# Patient Record
Sex: MALE | Race: WHITE | NOT HISPANIC OR LATINO | Employment: OTHER | URBAN - METROPOLITAN AREA
[De-identification: names, ages, dates, MRNs, and addresses within clinical notes are randomized per-mention and may not be internally consistent; named-entity substitution may affect disease eponyms.]

---

## 2017-05-17 ENCOUNTER — TRANSFERRED RECORDS (OUTPATIENT)
Dept: HEALTH INFORMATION MANAGEMENT | Facility: CLINIC | Age: 82
End: 2017-05-17

## 2017-05-18 ENCOUNTER — TRANSFERRED RECORDS (OUTPATIENT)
Dept: HEALTH INFORMATION MANAGEMENT | Facility: CLINIC | Age: 82
End: 2017-05-18

## 2017-06-09 ENCOUNTER — TRANSFERRED RECORDS (OUTPATIENT)
Dept: HEALTH INFORMATION MANAGEMENT | Facility: CLINIC | Age: 82
End: 2017-06-09

## 2017-08-09 ENCOUNTER — TRANSFERRED RECORDS (OUTPATIENT)
Dept: HEALTH INFORMATION MANAGEMENT | Facility: CLINIC | Age: 82
End: 2017-08-09

## 2017-08-13 ENCOUNTER — APPOINTMENT (OUTPATIENT)
Dept: MRI IMAGING | Facility: CLINIC | Age: 82
End: 2017-08-13
Attending: INTERNAL MEDICINE
Payer: MEDICARE

## 2017-08-13 ENCOUNTER — APPOINTMENT (OUTPATIENT)
Dept: CT IMAGING | Facility: CLINIC | Age: 82
End: 2017-08-13
Attending: EMERGENCY MEDICINE
Payer: MEDICARE

## 2017-08-13 ENCOUNTER — HOSPITAL ENCOUNTER (OUTPATIENT)
Facility: CLINIC | Age: 82
Setting detail: OBSERVATION
Discharge: HOME | End: 2017-08-14
Attending: EMERGENCY MEDICINE | Admitting: INTERNAL MEDICINE
Payer: MEDICARE

## 2017-08-13 ENCOUNTER — APPOINTMENT (OUTPATIENT)
Dept: SPEECH THERAPY | Facility: CLINIC | Age: 82
End: 2017-08-13
Payer: MEDICARE

## 2017-08-13 DIAGNOSIS — I63.9 OCCIPITAL STROKE (H): Primary | ICD-10-CM

## 2017-08-13 DIAGNOSIS — I63.50 CEREBROVASCULAR ACCIDENT (CVA) DUE TO STENOSIS OF CEREBRAL ARTERY (H): ICD-10-CM

## 2017-08-13 DIAGNOSIS — E87.1 HYPONATREMIA: ICD-10-CM

## 2017-08-13 DIAGNOSIS — R47.01 EXPRESSIVE APHASIA: ICD-10-CM

## 2017-08-13 PROBLEM — I69.320 APHASIA DUE TO RECENT CEREBROVASCULAR ACCIDENT: Status: ACTIVE | Noted: 2017-08-13

## 2017-08-13 LAB
ALBUMIN UR-MCNC: NEGATIVE MG/DL
AMORPH CRY #/AREA URNS HPF: ABNORMAL /HPF
ANION GAP SERPL CALCULATED.3IONS-SCNC: 6 MMOL/L (ref 3–14)
APPEARANCE UR: ABNORMAL
APTT PPP: 29 SEC (ref 22–37)
BASOPHILS # BLD AUTO: 0 10E9/L (ref 0–0.2)
BASOPHILS NFR BLD AUTO: 0.1 %
BILIRUB UR QL STRIP: NEGATIVE
BUN SERPL-MCNC: 20 MG/DL (ref 7–30)
CALCIUM SERPL-MCNC: 8.2 MG/DL (ref 8.5–10.1)
CHLORIDE SERPL-SCNC: 93 MMOL/L (ref 94–109)
CHOLEST SERPL-MCNC: 87 MG/DL
CO2 SERPL-SCNC: 27 MMOL/L (ref 20–32)
COLOR UR AUTO: YELLOW
CREAT SERPL-MCNC: 0.8 MG/DL (ref 0.66–1.25)
CRP SERPL-MCNC: <2.9 MG/L (ref 0–8)
DIFFERENTIAL METHOD BLD: ABNORMAL
EOSINOPHIL # BLD AUTO: 0 10E9/L (ref 0–0.7)
EOSINOPHIL NFR BLD AUTO: 0 %
ERYTHROCYTE [DISTWIDTH] IN BLOOD BY AUTOMATED COUNT: 14 % (ref 10–15)
GFR SERPL CREATININE-BSD FRML MDRD: ABNORMAL ML/MIN/1.7M2
GLUCOSE BLDC GLUCOMTR-MCNC: 114 MG/DL (ref 70–99)
GLUCOSE BLDC GLUCOMTR-MCNC: 135 MG/DL (ref 70–99)
GLUCOSE SERPL-MCNC: 136 MG/DL (ref 70–99)
GLUCOSE UR STRIP-MCNC: NEGATIVE MG/DL
HBA1C MFR BLD: 5.5 % (ref 4.3–6)
HCT VFR BLD AUTO: 34.5 % (ref 40–53)
HDLC SERPL-MCNC: 45 MG/DL
HGB BLD-MCNC: 12.5 G/DL (ref 13.3–17.7)
HGB UR QL STRIP: ABNORMAL
IMM GRANULOCYTES # BLD: 0.1 10E9/L (ref 0–0.4)
IMM GRANULOCYTES NFR BLD: 0.6 %
INR PPP: 1.11 (ref 0.86–1.14)
INTERPRETATION ECG - MUSE: NORMAL
KETONES UR STRIP-MCNC: NEGATIVE MG/DL
LDLC SERPL CALC-MCNC: 35 MG/DL
LEUKOCYTE ESTERASE UR QL STRIP: NEGATIVE
LYMPHOCYTES # BLD AUTO: 0.5 10E9/L (ref 0.8–5.3)
LYMPHOCYTES NFR BLD AUTO: 5.9 %
MCH RBC QN AUTO: 35.1 PG (ref 26.5–33)
MCHC RBC AUTO-ENTMCNC: 36.2 G/DL (ref 31.5–36.5)
MCV RBC AUTO: 97 FL (ref 78–100)
MONOCYTES # BLD AUTO: 0.5 10E9/L (ref 0–1.3)
MONOCYTES NFR BLD AUTO: 5.3 %
NEUTROPHILS # BLD AUTO: 8 10E9/L (ref 1.6–8.3)
NEUTROPHILS NFR BLD AUTO: 88.1 %
NITRATE UR QL: NEGATIVE
NONHDLC SERPL-MCNC: 42 MG/DL
NRBC # BLD AUTO: 0 10*3/UL
NRBC BLD AUTO-RTO: 0 /100
PH UR STRIP: 7.5 PH (ref 5–7)
PLATELET # BLD AUTO: 164 10E9/L (ref 150–450)
POTASSIUM SERPL-SCNC: 4.2 MMOL/L (ref 3.4–5.3)
RBC # BLD AUTO: 3.56 10E12/L (ref 4.4–5.9)
RBC #/AREA URNS AUTO: 17 /HPF (ref 0–2)
SODIUM SERPL-SCNC: 126 MMOL/L (ref 133–144)
SODIUM SERPL-SCNC: 128 MMOL/L (ref 133–144)
SP GR UR STRIP: 1.05 (ref 1–1.03)
TRIGL SERPL-MCNC: 33 MG/DL
TROPONIN I SERPL-MCNC: NORMAL UG/L (ref 0–0.04)
TSH SERPL DL<=0.005 MIU/L-ACNC: 1.81 MU/L (ref 0.4–4)
URN SPEC COLLECT METH UR: ABNORMAL
UROBILINOGEN UR STRIP-MCNC: NORMAL MG/DL (ref 0–2)
WBC # BLD AUTO: 9 10E9/L (ref 4–11)
WBC #/AREA URNS AUTO: 0 /HPF (ref 0–2)

## 2017-08-13 PROCEDURE — 25000128 H RX IP 250 OP 636: Performed by: INTERNAL MEDICINE

## 2017-08-13 PROCEDURE — 99220 ZZC INITIAL OBSERVATION CARE,LEVL III: CPT | Performed by: INTERNAL MEDICINE

## 2017-08-13 PROCEDURE — 85025 COMPLETE CBC W/AUTO DIFF WBC: CPT | Performed by: EMERGENCY MEDICINE

## 2017-08-13 PROCEDURE — 85610 PROTHROMBIN TIME: CPT | Performed by: EMERGENCY MEDICINE

## 2017-08-13 PROCEDURE — 25000125 ZZHC RX 250: Performed by: EMERGENCY MEDICINE

## 2017-08-13 PROCEDURE — 82306 VITAMIN D 25 HYDROXY: CPT | Performed by: INTERNAL MEDICINE

## 2017-08-13 PROCEDURE — 70498 CT ANGIOGRAPHY NECK: CPT

## 2017-08-13 PROCEDURE — 84295 ASSAY OF SERUM SODIUM: CPT | Performed by: INTERNAL MEDICINE

## 2017-08-13 PROCEDURE — 99285 EMERGENCY DEPT VISIT HI MDM: CPT | Mod: 25

## 2017-08-13 PROCEDURE — 85730 THROMBOPLASTIN TIME PARTIAL: CPT | Performed by: EMERGENCY MEDICINE

## 2017-08-13 PROCEDURE — 84443 ASSAY THYROID STIM HORMONE: CPT | Performed by: INTERNAL MEDICINE

## 2017-08-13 PROCEDURE — 92610 EVALUATE SWALLOWING FUNCTION: CPT | Mod: GN

## 2017-08-13 PROCEDURE — G0378 HOSPITAL OBSERVATION PER HR: HCPCS

## 2017-08-13 PROCEDURE — 25000128 H RX IP 250 OP 636: Performed by: EMERGENCY MEDICINE

## 2017-08-13 PROCEDURE — 00000146 ZZHCL STATISTIC GLUCOSE BY METER IP

## 2017-08-13 PROCEDURE — 70470 CT HEAD/BRAIN W/O & W/DYE: CPT | Mod: XS

## 2017-08-13 PROCEDURE — 81001 URINALYSIS AUTO W/SCOPE: CPT | Performed by: EMERGENCY MEDICINE

## 2017-08-13 PROCEDURE — 83036 HEMOGLOBIN GLYCOSYLATED A1C: CPT | Performed by: INTERNAL MEDICINE

## 2017-08-13 PROCEDURE — 99207 ZZC CDG-CODE CATEGORY CHANGED: CPT | Performed by: INTERNAL MEDICINE

## 2017-08-13 PROCEDURE — 93005 ELECTROCARDIOGRAM TRACING: CPT

## 2017-08-13 PROCEDURE — 70553 MRI BRAIN STEM W/O & W/DYE: CPT

## 2017-08-13 PROCEDURE — 84484 ASSAY OF TROPONIN QUANT: CPT | Performed by: EMERGENCY MEDICINE

## 2017-08-13 PROCEDURE — 84484 ASSAY OF TROPONIN QUANT: CPT | Performed by: INTERNAL MEDICINE

## 2017-08-13 PROCEDURE — 40000225 ZZH STATISTIC SLP WARD VISIT

## 2017-08-13 PROCEDURE — 86140 C-REACTIVE PROTEIN: CPT | Performed by: INTERNAL MEDICINE

## 2017-08-13 PROCEDURE — 96361 HYDRATE IV INFUSION ADD-ON: CPT

## 2017-08-13 PROCEDURE — 80061 LIPID PANEL: CPT | Performed by: INTERNAL MEDICINE

## 2017-08-13 PROCEDURE — 36415 COLL VENOUS BLD VENIPUNCTURE: CPT | Performed by: INTERNAL MEDICINE

## 2017-08-13 PROCEDURE — 80048 BASIC METABOLIC PNL TOTAL CA: CPT | Performed by: EMERGENCY MEDICINE

## 2017-08-13 PROCEDURE — 25000132 ZZH RX MED GY IP 250 OP 250 PS 637: Performed by: INTERNAL MEDICINE

## 2017-08-13 PROCEDURE — 96360 HYDRATION IV INFUSION INIT: CPT

## 2017-08-13 PROCEDURE — 70460 CT HEAD/BRAIN W/DYE: CPT

## 2017-08-13 PROCEDURE — A9585 GADOBUTROL INJECTION: HCPCS | Performed by: INTERNAL MEDICINE

## 2017-08-13 PROCEDURE — 92523 SPEECH SOUND LANG COMPREHEN: CPT | Mod: GN

## 2017-08-13 RX ORDER — HYDRALAZINE HYDROCHLORIDE 20 MG/ML
10-20 INJECTION INTRAMUSCULAR; INTRAVENOUS
Status: DISCONTINUED | OUTPATIENT
Start: 2017-08-13 | End: 2017-08-14 | Stop reason: HOSPADM

## 2017-08-13 RX ORDER — LORATADINE 10 MG/1
10 TABLET ORAL DAILY PRN
COMMUNITY

## 2017-08-13 RX ORDER — HYDROCORTISONE 2.5 %
CREAM (GRAM) TOPICAL 2 TIMES DAILY
COMMUNITY
End: 2018-07-23

## 2017-08-13 RX ORDER — LABETALOL HYDROCHLORIDE 5 MG/ML
10-40 INJECTION, SOLUTION INTRAVENOUS EVERY 10 MIN PRN
Status: DISCONTINUED | OUTPATIENT
Start: 2017-08-13 | End: 2017-08-14 | Stop reason: HOSPADM

## 2017-08-13 RX ORDER — NALOXONE HYDROCHLORIDE 0.4 MG/ML
.1-.4 INJECTION, SOLUTION INTRAMUSCULAR; INTRAVENOUS; SUBCUTANEOUS
Status: DISCONTINUED | OUTPATIENT
Start: 2017-08-13 | End: 2017-08-14 | Stop reason: HOSPADM

## 2017-08-13 RX ORDER — OMEGA-3S/DHA/EPA/FISH OIL/D3 300MG-1000
400 CAPSULE ORAL DAILY
COMMUNITY
End: 2022-11-28

## 2017-08-13 RX ORDER — MAGNESIUM CARB/ALUMINUM HYDROX 105-160MG
1 TABLET,CHEWABLE ORAL DAILY
COMMUNITY
End: 2018-07-23

## 2017-08-13 RX ORDER — GADOBUTROL 604.72 MG/ML
6 INJECTION INTRAVENOUS ONCE
Status: COMPLETED | OUTPATIENT
Start: 2017-08-13 | End: 2017-08-13

## 2017-08-13 RX ORDER — SODIUM CHLORIDE 9 MG/ML
INJECTION, SOLUTION INTRAVENOUS CONTINUOUS
Status: DISCONTINUED | OUTPATIENT
Start: 2017-08-13 | End: 2017-08-14 | Stop reason: HOSPADM

## 2017-08-13 RX ORDER — ASPIRIN 300 MG/1
300 SUPPOSITORY RECTAL DAILY
Status: DISCONTINUED | OUTPATIENT
Start: 2017-08-13 | End: 2017-08-14 | Stop reason: HOSPADM

## 2017-08-13 RX ORDER — METRONIDAZOLE 10 MG/G
GEL TOPICAL DAILY
COMMUNITY

## 2017-08-13 RX ORDER — BETAMETHASONE DIPROPIONATE 0.5 MG/G
CREAM TOPICAL 2 TIMES DAILY PRN
COMMUNITY
End: 2022-11-28

## 2017-08-13 RX ORDER — ASPIRIN 300 MG/1
300 SUPPOSITORY RECTAL ONCE
Status: COMPLETED | OUTPATIENT
Start: 2017-08-13 | End: 2017-08-13

## 2017-08-13 RX ORDER — LIDOCAINE 40 MG/G
CREAM TOPICAL
Status: DISCONTINUED | OUTPATIENT
Start: 2017-08-13 | End: 2017-08-14 | Stop reason: HOSPADM

## 2017-08-13 RX ORDER — MULTIVIT WITH MINERALS/LUTEIN
1 TABLET ORAL DAILY
COMMUNITY
End: 2018-07-23

## 2017-08-13 RX ORDER — IOPAMIDOL 755 MG/ML
120 INJECTION, SOLUTION INTRAVASCULAR ONCE
Status: COMPLETED | OUTPATIENT
Start: 2017-08-13 | End: 2017-08-13

## 2017-08-13 RX ADMIN — ASPIRIN 300 MG: 300 SUPPOSITORY RECTAL at 11:46

## 2017-08-13 RX ADMIN — SODIUM CHLORIDE 100 ML: 9 INJECTION, SOLUTION INTRAVENOUS at 10:09

## 2017-08-13 RX ADMIN — SODIUM CHLORIDE 500 ML: 9 INJECTION, SOLUTION INTRAVENOUS at 09:55

## 2017-08-13 RX ADMIN — SODIUM CHLORIDE: 9 INJECTION, SOLUTION INTRAVENOUS at 11:01

## 2017-08-13 RX ADMIN — SODIUM CHLORIDE: 9 INJECTION, SOLUTION INTRAVENOUS at 19:41

## 2017-08-13 RX ADMIN — IOPAMIDOL 120 ML: 755 INJECTION, SOLUTION INTRAVENOUS at 10:09

## 2017-08-13 RX ADMIN — GADOBUTROL 6 ML: 604.72 INJECTION INTRAVENOUS at 17:10

## 2017-08-13 ASSESSMENT — ENCOUNTER SYMPTOMS
NUMBNESS: 0
SPEECH DIFFICULTY: 1
HEADACHES: 1
WEAKNESS: 0
SHORTNESS OF BREATH: 0
FEVER: 0
DIZZINESS: 1

## 2017-08-13 ASSESSMENT — ACTIVITIES OF DAILY LIVING (ADL)
COGNITION: 0 - NO COGNITION ISSUES REPORTED
AMBULATION: 0-->INDEPENDENT
WHICH_OF_THE_ABOVE_FUNCTIONAL_RISKS_HAD_A_RECENT_ONSET_OR_CHANGE?: COMMUNICATION/SPEECH;COGNITION
TOILETING: 0-->INDEPENDENT
RETIRED_COMMUNICATION: 0-->UNDERSTANDS/COMMUNICATES WITHOUT DIFFICULTY
RETIRED_EATING: 0-->INDEPENDENT
BATHING: 0-->INDEPENDENT
DRESS: 0-->INDEPENDENT
TRANSFERRING: 0-->INDEPENDENT
SWALLOWING: 0-->SWALLOWS FOODS/LIQUIDS WITHOUT DIFFICULTY

## 2017-08-13 ASSESSMENT — VISUAL ACUITY
OU: NORMAL ACUITY
OU: NORMAL ACUITY;GLASSES

## 2017-08-13 NOTE — IP AVS SNAPSHOT
MRN:[id number]                      After Visit Summary   8/13/2017    Corwin Puentes    MRN: [id number]           Thank you!     Thank you for choosing [facility] for your care. Our goal is always to provide you with excellent care. Hearing back from our patients is one way we can continue to improve our services. Please take a few minutes to complete the written survey that you may receive in the mail after you visit with us. Thank you!        Patient Information     Date Of Birth          5/27/1927        Designated Caregiver       Most Recent Value    Caregiver    Will someone help with your care after discharge? yes    Name of designated caregiver wife    Phone number of caregiver see facesheet    Caregiver address see facesheet      About your hospital stay     You were admitted on:  August 13, 2017 You last received care in the:  [address] [facility]    You were discharged on:  August 14, 2017        Reason for your hospital stay       Admitted to hospital due to some trouble with word finding and finishing sentences.  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                  Who to Call     For medical emergencies, please call 911.  For non-urgent questions about your medical care, please call your primary care provider or clinic, [phone]          Attending Provider     Provider Specialty    Shannan Key MD Emergency Medicine    [facility]       Primary Care Provider Office Phone # Fax #    Hernandez Heaton [phone]      After Care Instructions     Diet       Follow this diet upon discharge: Orders Placed This Encounter      Combination Diet Regular Diet Adult; Thin Liquids (water, ice chips, juice, milk, gelatin, ice cream, etc)                  Follow-up Appointments     Follow-up and recommended labs and tests        You have selected [facility] to establish your Primary  [facility].  Please see your appointment listed below    Hold metoprolol until seen by primary care physician to recheck pulse    You should receive a call from Sandeep from [facility] to schedule a Follow-up with Dr. Robison in one month for follow-up of stroke. Located at:    [facility]  [facility]   [address], [address]   [place] [address]    [phone] (Neurology services)                  Your next 10 appointments already scheduled     Aug 21, 2017 11:40 AM CDT   Office Visit with Malathi Johnson MD   [facility] ([facility]    [address] [address] [phone]           Bring a current list of meds and any records pertaining to this visit. For Physicals, please bring immunization records and any forms needing to be filled out. Please arrive 10 minutes early to complete paperwork.              Additional Services     Occupational Therapy Referral       *This therapy referral will be filtered to a centralized scheduling office at [facility] and the patient will receive a call to schedule an appointment at a [place] location most convenient for them. *     [facility] provides Occupational Therapy evaluation and treatment and many specialty services across the [place] system.  If requesting a specialty program, please choose from the list below.    If you have not heard from the scheduling office within 2 business days, please call [phone] for all locations, with the exception of [place], please call [phone].     Treatment: Evaluation & Treatment  Special Instructions/Modalities:   Special Programs: Cognition Assessment     Please be aware that coverage of these services is subject to the terms and limitations of your health insurance plan.  Call member services at your health plan with any benefit or coverage questions.      **Note to Provider:  If  "you are referring outside of [place] for the therapy appointment, please list the name of the location in the \"special instructions\" above, print the referral and give to the patient to schedule the appointment.            Speech Therapy Referral       *This therapy referral will be filtered to a centralized scheduling office at [facility] and the patient will receive a call to schedule an appointment at a [place] location most convenient for them. *     [facility] provides Speech Therapy evaluation and treatment and many specialty services across the [place] system.  If requesting a specialty program, please choose from the list below.  If you have not heard from the scheduling office within 2 business days, please call [phone] for all locations, with the exception of [place], please call [phone].       Treatment: Evaluation & Treatment  Speech Treatment Diagnosis: Cognitive Deficits  Recent stroke with expressive aphasia  Special Instructions: none  Special Programs: Cognition Assessment - per speech and PT evaluation done as inpatient     Please be aware that coverage of these services is subject to the terms and limitations of your health insurance plan.  Call member services at your health plan with any benefit or coverage questions.      **Note to Provider:  If you are referring outside of [place] for the therapy appointment, please list the name of the location in the \"special instructions\" above, print the referral and give to the patient to schedule the appointment.                  Further instructions from your care team       Your risk factors for stroke or TIA (transient ischemic attack):    Your Risk Factors Your Results Normal Ranges   High blood pressure BP Readings from Last 1 Encounters:   08/14/17 105/60    Less than 120/80   Cholesterol              Total Lab Results   Component Value Date    CHOL 87 08/13/2017      Less than 150    " Triglycerides   Lab Results   Component Value Date    TRIG 33 08/13/2017    Less than 150   LDL Lab Results   Component Value Date    LDL 35 08/13/2017       Less than 70   HDL Lab Results   Component Value Date    HDL 45 08/13/2017            Greater than 40 (men)  Greater than 50 (women)   Diabetes   Recent Labs  Lab 08/14/17  0812   GLC 98    Fasting blood glucose    Smoking/tobacco use  Quit smoking and tobacco   Overweight  Lose 1-2 pounds a week   Lack of exercise  30 minutes moderate activity each day   Other risk factors include carotid (neck) artery disease, atrial fibrillation and stress. You may be on new medicine to treat high blood pressure, cholesterol, diabetes or atrial fibrillation.    Understanding Stroke Booklet given to patient. Please refer to booklet for further information.    Stroke warning signs and symptoms - CALL 911 right away for:  - Sudden numbness or weakness in the face, arm or leg (often on one side of the body).  - Sudden confusion or trouble understanding what is going on.  - Sudden blurred or decreased vision in one or both eyes.  - Sudden trouble speaking, loss of balance, dizziness or problems with coordination.  - Sudden, severe headache for no reason.  - Fainting or seizures.  - Symptoms may go away then come back suddenly.    Pending Results     Date and Time Order Name Status Description    8/13/2017 1347 Vitamin D Deficiency In process             Statement of Approval     Ordered          08/14/17 1348  I have reviewed and agree with all the recommendations and orders detailed in this document.  EFFECTIVE NOW     Approved and electronically signed by:  Natalie Pollard MD             Admission Information     Date & Time Provider Department Dept. Phone    8/13/2017 Natalie Pollard MD [address] [facility] [phone]      Your Vitals Were     Blood Pressure Pulse Temperature Respirations Height Weight    105/60 (BP Location: Right arm)  "75 97.6  F (36.4  C) (Oral) 16 1.753 m (5' 9\") 63.2 kg (139 lb 5.3 oz)    Pulse Oximetry BMI (Body Mass Index)                98% 20.58 kg/m2          [email or web address] Information     [email or web address] lets you send messages to your doctor, view your test results, renew your prescriptions, schedule appointments and more. To sign up, go to [email or web address] . Click on \"Log in\" on the left side of the screen, which will take you to the Welcome page. Then click on \"Sign up Now\" on the right side of the page.     You will be asked to enter the access code listed below, as well as some personal information. Please follow the directions to create your username and password.     Your access code is: [email or web address]  Expires: 2017 11:54 AM     Your access code will  in 90 days. If you need help or a new code, please call your [place] clinic or [phone].        Care EveryWhere ID     This is your Care EveryWhere ID. This could be used by other organizations to access your [place] medical records  [id number]        Equal Access to Services     MELINDA STEEL AH: Tracy Elam, wafani mallory, qaoswald kaalmada liset, arlene wilkinson. So [facility] [phone].    ATENCIÓN: Si habla español, tiene a kathleen disposición servicios gratuitos de asistencia lingüística. Kofi al [phone].    We comply with applicable federal civil rights laws and [place] laws. We do not discriminate on the basis of race, color, national origin, age, disability sex, sexual orientation or gender identity.               Review of your medicines      CONTINUE these medicines which may have CHANGED, or have new prescriptions. If we are uncertain of the size of tablets/capsules you have at home, strength may be listed as something that might have changed.        Dose / Directions    aspirin 325 MG EC tablet   This may have changed:    - medication strength  - how much to take        Dose:  325 mg   Take 1 tablet " (325 mg) by mouth daily   Quantity:  40 tablet   Refills:  1         CONTINUE these medicines which have NOT CHANGED        Dose / Directions    ATIVAN PO        Dose:  0.25 mg   Take 0.25 mg by mouth daily   Refills:  0       BENADRYL PO        Dose:  25 mg   Take 25 mg by mouth daily as needed   Refills:  0       betamethasone dipropionate 0.05 % cream   Commonly known as:  DIPROSONE        Apply topically 2 times daily   Refills:  0       CENTRUM SILVER per tablet        Dose:  1 tablet   Take 1 tablet by mouth daily   Refills:  0       cholecalciferol 400 UNITS tablet   Commonly known as:  Vitamin D        Dose:  400 Units   Take 400 Units by mouth daily   Refills:  0       glucosamine-chondroitinoitin 750-600 MG Tabs        Dose:  1 tablet   Take 1 tablet by mouth daily   Refills:  0       hydrocortisone 2.5 % cream        Apply topically 2 times daily   Refills:  0       LIPITOR PO        Dose:  10 mg   Take 10 mg by mouth daily   Refills:  0       loratadine 10 MG tablet   Commonly known as:  CLARITIN        Dose:  10 mg   Take 10 mg by mouth daily   Refills:  0       metroNIDAZOLE 1 % gel   Commonly known as:  METROGEL        Apply topically daily   Refills:  0       polyethylene glycol 0.4%- propylene glycol 0.3% 0.4-0.3 % Soln ophthalmic solution   Commonly known as:  SYSTANE ULTRA        Dose:  1 drop   Place 1 drop into both eyes 2 times daily   Refills:  0       saline 0.9 % Aers        Dose:  1 spray   Spray 1 spray in nostril daily   Refills:  0         STOP taking     METOPROLOL SUCCINATE ER PO                    Protect others around you: Learn how to safely use, store and throw away your medicines at www.disposemymeds.org.             Medication List: This is a list of all your medications and when to take them. Check marks below indicate your daily home schedule. Keep this list as a reference.      Medications           Morning Afternoon Evening Bedtime As Needed    aspirin 325 MG EC tablet   Take  1 tablet (325 mg) by mouth daily   Last time this was given:  325 mg on 8/14/2017  8:20 AM                                   ATIVAN PO   Take 0.25 mg by mouth daily                                   BENADRYL PO   Take 25 mg by mouth daily as needed                                   betamethasone dipropionate 0.05 % cream   Commonly known as:  DIPROSONE   Apply topically 2 times daily   Last time this was given:  resume                                CENTRUM SILVER per tablet   Take 1 tablet by mouth daily   Last time this was given:  resume                                cholecalciferol 400 UNITS tablet   Commonly known as:  Vitamin D   Take 400 Units by mouth daily   Last time this was given:  resume                                glucosamine-chondroitinoitin 750-600 MG Tabs   Take 1 tablet by mouth daily   Last time this was given:  resume                                hydrocortisone 2.5 % cream   Apply topically 2 times daily   Last time this was given:  resume                                LIPITOR PO   Take 10 mg by mouth daily                                   loratadine 10 MG tablet   Commonly known as:  CLARITIN   Take 10 mg by mouth daily   Last time this was given:  resume                                metroNIDAZOLE 1 % gel   Commonly known as:  METROGEL   Apply topically daily   Last time this was given:  resume                                polyethylene glycol 0.4%- propylene glycol 0.3% 0.4-0.3 % Soln ophthalmic solution   Commonly known as:  SYSTANE ULTRA   Place 1 drop into both eyes 2 times daily   Last time this was given:  resume                                saline 0.9 % Aers   Spray 1 spray in nostril daily   Last time this was given:  resume

## 2017-08-13 NOTE — PHARMACY-ADMISSION MEDICATION HISTORY
Admission medication history interview status for the 8/13/2017  admission is complete. See EPIC admission navigator for prior to admission medications     Medication history source reliability:Good    Actions taken by pharmacist (provider contacted, etc):None     Additional medication history information not noted on PTA med list :None    Medication reconciliation/reorder completed by provider prior to medication history? No    Time spent in this activity: 15 minutes    Prior to Admission medications    Medication Sig Last Dose Taking? Auth Provider   ASPIRIN PO Take 81 mg by mouth daily  8/12/2017 at Unknown time Yes Reported, Patient   betamethasone dipropionate (DIPROSONE) 0.05 % cream Apply topically 2 times daily 8/12/2017 at Unknown time Yes Reported, Patient   loratadine (CLARITIN) 10 MG tablet Take 10 mg by mouth daily 8/12/2017 at Unknown time Yes Reported, Patient   hydrocortisone 2.5 % cream Apply topically 2 times daily 8/12/2017 at Unknown time Yes Reported, Patient   Atorvastatin Calcium (LIPITOR PO) Take 10 mg by mouth daily  8/12/2017 at Unknown time Yes Reported, Patient   METOPROLOL SUCCINATE ER PO Take 12.5 mg by mouth 2 times daily  8/12/2017 at Unknown time Yes Reported, Patient   metroNIDAZOLE (METROGEL) 1 % gel Apply topically daily 8/12/2017 at Unknown time Yes Reported, Patient   LORazepam (ATIVAN PO) Take 0.25 mg by mouth daily 8/12/2017 at Unknown time Yes Unknown, Entered By History   Multiple Vitamins-Minerals (CENTRUM SILVER) per tablet Take 1 tablet by mouth daily 8/12/2017 at Unknown time Yes Unknown, Entered By History   glucosamine-chondroitinoitin 750-600 MG TABS Take 1 tablet by mouth daily 8/12/2017 at Unknown time Yes Unknown, Entered By History   saline 0.9 % AERS Spray 1 spray in nostril daily 8/12/2017 at Unknown time Yes Unknown, Entered By History   DiphenhydrAMINE HCl (BENADRYL PO) Take 25 mg by mouth daily as needed prn Yes Unknown, Entered By History   cholecalciferol  (VITAMIN D) 400 UNITS tablet Take 400 Units by mouth daily 8/12/2017 at Unknown time Yes Unknown, Entered By History   polyethylene glycol 0.4%- propylene glycol 0.3% (SYSTANE ULTRA) 0.4-0.3 % SOLN ophthalmic solution Place 1 drop into both eyes 2 times daily 8/12/2017 at Unknown time Yes Unknown, Entered By History

## 2017-08-13 NOTE — UTILIZATION REVIEW
"[facility] Utilization Review  Admission Status; Secondary Review Determination     Admission Date: 8/13/2017  9:26 AM      Under the authority of the Utilization Management Committee, the utilization review process indicated a secondary review on the above patient.  The review outcome is based on review of the medical records, discussions with staff, and applying clinical experience noted on the date of the review.        (X) Observation Status Appropriate - This patient does not meet hospital inpatient criteria and is placed in observation status. If this patient's primary payer is Medicare and was admitted as an inpatient, Condition Code 44 should be used and patient status changed to \"observation\".   () Observation Status concurrent Review           RATIONALE FOR DETERMINATION   89 year old male who presented with expressive aphasia, is admitted as inpatient with suspected left MCA transient ischemic attack. Symptoms have resolved and Initial evaluation was un-revealing for acute ischemic stroke on brain imaging. He has  of right vertebral artery. He is commenced asa, physical therapy and speech evaluation. MRI of brain is pending. Given no imaging evidence of stroke and resolution of symptoms with no residual effects, and anticipated length of stay less than two midnights, criteria for inpatient admission is not met. Observation status is appropriate for cares noted above. I discussed plan of care with Dr. Pollard who concurred with the determination.      The severity of illness, intensity of service provided, expected LOS make the care appropriate for observation status at this time.        The information on this document is developed by the utilization review team in order for the business office to ensure compliance.  This only denotes the appropriateness of proper admission status and does not reflect the quality of care rendered.              Sincerely,       Donna Solitario MD  Physician " Advisor  Utilization Review-[place]    Phone: [phone]

## 2017-08-13 NOTE — PROGRESS NOTES
08/13/17 1400   General Information   Onset Date 08/13/17   Start of Care Date 08/13/17   Referring Physician Natalie Jenkins MD   Patient/Family Goals Statement None stated   Swallowing Evaluation Bedside swallow evaluation   Behaviorial Observations WFL (within functional limits)   Mode of current nutrition NPO   Comments Pt presents with some expressive aphasia to the ED this am, but no hx of CVA and currently the CT scans are not showing any acute CVA. His family is present and states that his expressive language is much improved from this am.    Clinical Swallow Evaluation   Oral Musculature generally intact   Structural Abnormalities none present   Dentition present and adequate   Mucosal Quality good   Mandibular Strength and Mobility intact   Oral Labial Strength and Mobility WFL   Lingual Strength and Mobility WFL   Velar Elevation intact   Buccal Strength and Mobility intact   Additional Documentation Yes   Clinical Swallow Eval: Thin Liquid Texture Trial   Mode of Presentation, Thin Liquids cup   Volume of Liquid or Food Presented 1/2 c   Oral Phase of Swallow WFL   Pharyngeal Phase of Swallow intact   Diagnostic Statement No deficits   Clinical Swallow Eval: Puree Solid Texture Trial   Mode of Presentation, Puree spoon   Volume of Puree Presented 1/4 cup   Oral Phase, Puree WFL   Pharyngeal Phase, Puree intact   Diagnostic Statement No deficits   Clinical Swallow Eval: Solid Food Texture Trial   Mode of Presentation, Solid self-fed   Volume of Solid Food Presented 2 bites   Oral Phase, Solid WFL   Pharyngeal Phase, Solid intact   Diagnostic Statement No deficits   Swallow Compensations   Swallow Compensations No compensations were used   Esophageal Phase of Swallow   Patient reports or presents with symptoms of esophageal dysphagia No   General Therapy Interventions   Planned Therapy Interventions Language;Communication;Cognitive Treatment   Intervention Comments No dysphagia tx needed   Swallow  Eval: Clinical Impressions   Skilled Criteria for Therapy Intervention Skilled criteria met.  Treatment indicated.   Functional Assessment Scale (FAS) 4   Treatment Diagnosis mild mod cognitive linguistic deficits   Diet texture recommendations Regular diet;Thin liquids   Recommended Feeding/Eating Techniques maintain upright posture during/after eating for 30 mins   Therapy Frequency 5 times/wk   Predicted Duration of Therapy Intervention (days/wks) 1 wk   Anticipated Discharge Disposition home w/ assist   Risks and Benefits of Treatment have been explained. Yes   Patient, family and/or staff in agreement with Plan of Care Yes   Clinical Impression Comments Pt seen in his room with family present. Although he did have expressive aphasia this am, they report he is much improved now. All deny that he has had difficulty with eating and drinking since the onset of his symptoms. Trials of thins, purees and solids given with no overt s/sx of aspiration. Timely onset of hyolaryngeal elevation and no voice changes following trials. No oral residue.    Total Evaluation Time   Total Evaluation Time (Minutes) 10

## 2017-08-13 NOTE — PLAN OF CARE
Problem: Goal Outcome Summary  Goal: Goal Outcome Summary  Outcome: Improving  Pt alert and oriented x4. VSS. Tele SB. Neuros intact. No word finding difficulty noted. CMS intact. Denies pain. Ambulating with SBA. MRI and echo pending.

## 2017-08-13 NOTE — ED NOTES
"[facility]  ED Nurse Handoff Report    ED Chief complaint: Memory Loss (Patient brought in by family. Wife states patient has been experiencing forgetfulness and dizziness. States dizziness started last night. Difficulty finishing thoughtsand word searching started this am per family. ) and Dizziness      ED Diagnosis:   Final diagnoses:   Expressive aphasia   Hyponatremia       Code Status: Full Code    Allergies:   Allergies   Allergen Reactions     Alendronic Acid      Mold      Peanut-Derived      Pollen Extract      Risedronic Acid [Risedronate]        Activity level - Baseline/Home:  Independent    Activity Level - Current:   Independent     Needed?: No    Isolation: No  Infection: Not Applicable    Bariatric?: No    Vital Signs:   Vitals:    08/13/17 0923   BP: 126/62   Pulse: 75   Resp: 16   Temp: 98.2  F (36.8  C)   TempSrc: Oral   SpO2: 97%   Weight: 63.5 kg (140 lb)   Height: 1.753 m (5' 9\")       Cardiac Rhythm: ,   Cardiac  Cardiac Rhythm: Normal sinus rhythm    Pain level: 0-10 Pain Scale: 0    Is this patient confused?: No    Patient Report: Initial Complaint: Pt awoke this AM per wife having trouble finding his words  Focused Assessment: That was noted on admission. No other deficient noted  Tests Performed: CT, labs  Abnormal Results: See FCIS,   Treatments provided: fluid bolus.     Family Comments: Family at the beside    OBS brochure/video discussed/provided to patient: No    ED Medications:   Medications   0.9% sodium chloride infusion ( Intravenous New Bag 8/13/17 1101)   0.9% sodium chloride BOLUS (0 mLs Intravenous Stopped 8/13/17 1058)   iopamidol (ISOVUE-370) solution 120 mL (120 mLs Intravenous Given 8/13/17 1009)   sodium chloride 0.9 % for CT scan flush dose 100 mL (100 mLs Intravenous Given 8/13/17 1009)       Drips infusing?:  No      ED NURSE PHONE NUMBER: [phone]       "

## 2017-08-13 NOTE — H&P
PRIMARY CARE PHYSICIAN:  Through the [facility]      CHIEF COMPLAINT:  Speech difficulty.      HISTORY OF PRESENT ILLNESS:  Corwin Puentes is a delightful 89-year-old male with a history of hypertension, hyperlipidemia, SVT, followed by Cardiology at [facility] and known abdominal aortic aneurysm, also followed at [facility].  The patient has been in remarkably good health, given his age.  He awoke this morning and his wife noted that he had some expressive aphasia.  His speech was not garbled but he was having word finding difficulties and difficulty finishing his sentences which is unusual for him as he is normally quite sharp at baseline.  His wife also noted that around 3:30 in the morning that the bathroom rug was crumpled and she was worried that he had fallen.  When she went to see him; however, he was sound asleep.  The patient was normal last night before he went to bed.  He did apparently have a headache last night but denies that he has a headache today.  In the emergency room, the patient was going to get rectal aspirin, apparently had a baby aspirin at home.  Workup so far has been negative.  The ER physician did discuss the case with Neurology.  The plan is to have the patient admitted to the hospital for MRI and cardiac echo and Neurology to see.      PAST MEDICAL HISTORY:   1.  History of several squamous cell carcinomas, the last one was on his left nasal ala in 2014, followed by Dermatology at [facility].   2.  History of supraventricular tachycardia followed by Cardiology at [facility], last seen there in 05/2017 at which time he was doing well on metoprolol.   3.  History of known abdominal aortic aneurysm followed by serial ultrasounds at [facility].  His last one was from 05/2017 when it was 3.7 x 4.5 with no significant change compared with 08/2014.   4.  History mild left ventricular diastolic dysfunction noted on cardiac echo from 05/17/2017.   At that time he had 1 out of 4 LV   diastolic dysfunction.  He had a normal EF of 63%.  He had aortic valve sclerosis and mild to moderate tricuspid regurgitation.   5.  History of hypertension treated with metoprolol.   6.  History of anxiety in the past.   7.  History of osteoporosis, age-related.   8.  History of synovitis and tenosynovitis.   9.  History of asthma in the past, not currently.      PAST SURGICAL HISTORY:   1.  History of fused left ankle.   2.  History of a right total knee arthroplasty.   3.  History of surgery for deviated septum.      ALLERGIES:  Alendronic acid, mold, peanut derived pollen and risedronate.      MEDICATIONS:   1.  Currently being reconciled but does appear to be aspirin 81 mg daily.   2.  Atorvastatin 10 mg daily.    3.  Betamethasone cream 0.05% topically b.i.d.   4.  Vitamin D 400 units daily.   5.  Benadryl 25 mg daily.   6.  Glucosamine chondroitin 1 tablet daily.   7.  Hydrocortisone cream 2.5% applied b.i.d.   8.  Claritin 10 mg daily.   9.  Ativan 0.25 mg daily.   10.  Toprol succinate 12.5 mg b.i.d.   12.  MetroGel 1% apply topically.    13.  Multivitamin 1 daily.     14.  Systane Ultra ophthalmic solution 1 drop both eyes b.i.d.   15.  Saline nasal spray.      FAMILY HISTORY:  Mother  at 75.  Father  at 79.      SOCIAL HISTORY:  The patient has never smoked.  He drinks 1 alcoholic drink a day.  He is accompanied by his wife, a son, a daughter and a son-in-law.  He and his wife live in a condo at the [facility] and they also have a condo in [place] near [facility] where they spend the aguirre.  The patient still is doing some consulting.  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  The patient walks daily and also uses a stationary bike.        REVIEW OF SYSTEMS:  A 10-point review of systems is negative except as in history of present illness.      PHYSICAL EXAMINATION:   GENERAL:  Pleasant male who looks much younger than his stated age.     VITAL  SIGNS:  Blood pressure 126/62, pulse 75, respiratory rate 16, temp of 92.   HEENT:  Pupils are equal.  Extraocular movements are intact.  Pharynx without erythema.   NECK:  Supple.   CHEST:  Clear to auscultation, no wheezes or rhonchi.   CARDIOVASCULAR:  Regular rate and rhythm with a fixed split S2, no murmur.   ABDOMEN:  Positive bowel sounds, soft and nontender.   EXTREMITIES:  No edema.   NEUROLOGIC:  Cranial nerves II-XII are grossly intact.  Strength is 5/5 throughout.  Toes are downgoing.  His heel-shin was symmetrical.  He had some trouble with some word finding during the interview.  He also had, it seemed some problems following more complicated instructions such as the finger-nose-finger and heel-shin were more difficult for him to execute due to understanding instructions.  The patient may have had a subtle left-sided arm drift but this was difficult to quantify.      LABORATORY DATA:  His EKG showed sinus rhythm with PVCs, rate of 69.  Head CT showed atrophy and chronic white matter disease but nothing acute, left M1 calcification.  CT angiogram showed a normal [place] of [place], including variants, a chronically occluded right vertebral artery, distal right vertebral artery reconstitutes and ends in the PICA.  Mild to moderate stenosis at the origin of both internal carotid arteries.        LABORATORY DATA:  Sodium 126, potassium 4.2, chloride 93, bicarbonate 27, BUN 20, creatinine 0.80.  Calcium 8.2.  Troponin less than 0.15.  White count 9.0, hemoglobin 12.5, platelet count 164,000.  INR 1.11.  Urinalysis with a specific gravity 1.046, pH 7.5, trace blood, 0 white cells, 17 red cells and a few amorphous crystals.      ASSESSMENT AND PLAN:  Corwin Puentes is a very pleasant 89-year-old male with a history of known abdominal aortic aneurysm followed at [facility], hypertension, hyperlipidemia and a history of supraventricular tachycardia treated with metoprolol.  He is very active and does walking and  stationary bicycle at home almost every day.  He now presents with some expressive aphasia that is mostly related to word finding difficulties and difficulty finishing his sentences.  The patient got a baby aspirin at home.  He was started on a rectal aspirin in the emergency room.   1.  Probable cerebrovascular accident versus transient ischemic attack.  He still is having some trouble with speech and also some trouble with following instructions.  Plan to admit to the Neurology floor.  We will get an MRI and a cardiac echo with bubble study.  Neurology to see.  We will keep the patient n.p.o. until he passes his bedside swallow and will hold on all his oral medications until this is accomplished.  The patient will be continued on rectal aspirin.  He did have lipids done in 05/2017 at the [facility] and these showed good results.       2.  Hypertension.  For now, patient will be on permissive hypertension per the stroke protocol.   3.  History of known abdominal aortic aneurysm.  The ultrasound from [facility] did show a mural thrombus but apparently the size was stable compared with 2014.   4.  History of supraventricular tachycardia followed at [facility].   5.  Hyponatremia.  Per ER physician, the patient has had hyponatremia in the past.  We will have the patient on IV fluids with normal saline.  We will check a urine osmole and a urine sodium and recheck this later this evening and also in the morning.   6.  Hyperlipidemia.  Again, the patient is under good control.  In 2015 at [facility], his cholesterol was 97, triglycerides 46, HDL 52, LDL of 36.   7.  History of anxiety for which apparently the patient is on a low dose of Ativan.   8.  Code status was discussed and the patient is full code.   9.  Deep venous thrombosis prophylaxis:  The patient will be on SCDs.      DISPOSITION:  The patient will be admitted under inpatient status due to continuing neuro deficits, although they have improved  since this morning.         MANI CARRASCO MD             D: 2017 11:50   T: 2017 12:39   MT: ADELINE      Name:     SANTA DAS   MRN:      [id number]        Account:      [id number]   :      1927           Admitted:     [id number]      Document: [id number]

## 2017-08-13 NOTE — CONSULTS
[facility] [place]  [facility]    Vascular Neurology Consultation    Corwin Puentes MRN# [id number]   YOB: 1927 Age: 89 year old    Code Status:No Order   Date of Admission: 8/13/2017  Date of Consult:08/13/2017                                                                                       Assessment and Plan:                                         #. Left mca territory stroke/tia.Speech symptoms almost completely resolved  -Increased aspirin dosage to 325 mg daily.  Agree with plan to proceed with workup of MRI scan of the brain.Echocardiogram with bubble study  #. dyslipidemia  --Continue statin, check lipid profile  #. HTN  --permissive BP per stroke protocol.  Hold home antihypertensives for 48 hours, then resume if stable.   #. DVT Prophylaxis  --Mechanical   #. PT/OT/Speech  --Start evaluations once stable.   #. Nutrition / GI Prophylaxis  --Per recommendations of speech therapy    Plan discussed with patient and family    Dr. Robison to follow tomorrow  ----------------------------------------------------------------------------------  ----------------------------------------------------------------------------------  Reason for consult: I was asked by Dr. Key to evaluate this patient for Code stroke.       Chief Complaint:   Pt unaware/language difficulty/confusin  History is obtained from the patient / chart       History of Present Illness:   This patient is a 89 year old male who presents with Acute onset oflanguage and speaking difficulty on awakening this morning.  His symptoms have gradually improved during the course of his emergency room evaluation.  He at this point only notices a little bit of word finding difficulty.  His family is at bedside and agree that he has significantly improved.  He was feeling a bit dizzy last night.  He reported a mild headache this morning which is also resolved.  No visual disturbances.  No focal arm  or leg weakness.  No sensory disturbance.    He does have a history of TIAs past-the last was several years ago.Functionally he is in very good health and independent.  He moved here approximately two years ago to be closer to family from [place].         Past Medical History:     Past Medical History:   Diagnosis Date     Hypertension    Hypercholesterolemia, TIAs      Past Surgical History:     Past Surgical History:   Procedure Laterality Date     ORTHOPEDIC SURGERY     Knee and ankle surgery       Social History:     Social History     Social History     Marital status:      Spouse name: N/A     Number of children: N/A     Years of education: N/A     Social History Main Topics     Smoking status: Not on file     Smokeless tobacco: Not on file     Alcohol use Not on file     Drug use: Not on file     Sexual activity: Not on file     Other Topics Concern     Not on file     Social History Narrative     No narrative on file     Patient denies smoking, no significant alcohol intake, denies illicit drugs use       Family History:   No family history on file.  Reviewed and not felt to be contributory.       Home Medications:     Prior to Admission Medications   Prescriptions Last Dose Informant Patient Reported? Taking?   ASPIRIN PO 8/12/2017 at Unknown time Son Yes Yes   Sig: Take 81 mg by mouth daily    Atorvastatin Calcium (LIPITOR PO) 8/12/2017 at Unknown time Son Yes Yes   Sig: Take 10 mg by mouth daily    DiphenhydrAMINE HCl (BENADRYL PO) prn Son Yes Yes   Sig: Take 25 mg by mouth daily as needed   LORazepam (ATIVAN PO) 8/12/2017 at Unknown time Son Yes Yes   Sig: Take 0.25 mg by mouth daily   METOPROLOL SUCCINATE ER PO 8/12/2017 at Unknown time Son Yes Yes   Sig: Take 12.5 mg by mouth 2 times daily    Multiple Vitamins-Minerals (CENTRUM SILVER) per tablet 8/12/2017 at Unknown time Son Yes Yes   Sig: Take 1 tablet by mouth daily   betamethasone dipropionate (DIPROSONE) 0.05 % cream 8/12/2017 at Unknown  time Son Yes Yes   Sig: Apply topically 2 times daily   cholecalciferol (VITAMIN D) 400 UNITS tablet 8/12/2017 at Unknown time Son Yes Yes   Sig: Take 400 Units by mouth daily   glucosamine-chondroitinoitin 750-600 MG TABS 8/12/2017 at Unknown time Son Yes Yes   Sig: Take 1 tablet by mouth daily   hydrocortisone 2.5 % cream 8/12/2017 at Unknown time Son Yes Yes   Sig: Apply topically 2 times daily   loratadine (CLARITIN) 10 MG tablet 8/12/2017 at Unknown time Son Yes Yes   Sig: Take 10 mg by mouth daily   metroNIDAZOLE (METROGEL) 1 % gel 8/12/2017 at Unknown time Son Yes Yes   Sig: Apply topically daily   polyethylene glycol 0.4%- propylene glycol 0.3% (SYSTANE ULTRA) 0.4-0.3 % SOLN ophthalmic solution 8/12/2017 at Unknown time Son Yes Yes   Sig: Place 1 drop into both eyes 2 times daily   saline 0.9 % AERS 8/12/2017 at Unknown time Son Yes Yes   Sig: Spray 1 spray in nostril daily      Facility-Administered Medications: None          Allergy:     Allergies   Allergen Reactions     Alendronic Acid      Mold      Peanut-Derived      Pollen Extract      Risedronic Acid [Risedronate]           Inpatient Medications:   Scheduled Meds:   PRN Meds:         Review of Systems    The Review of Systems is negative other than noted in the HPI  A comprehensive review of  10 systems was performed and found to be negative except as described in this note  CONSTITUTIONAL: negative for fever, chills, change in weight  INTEGUMENTARY/SKIN: no rash or obvious new lesions  ENT/MOUTH: no sore throat, new sinus pain or nasal drainage, no neck mass noted  RESP: No pleuretic pain, No cough, no hemoptysis, No SOB   CV: negative for chest pain, palpitations or peripheral edema  GI: no nausea, vomiting, change in stools  : no dysuria or hematuria  MUSCULOSKELETAL: no myalgias, arthralgias or join efffusion  ENDOCRINE: no history of polyuria, polydyspsia or symptoms of thyroid dysfunction  PSYCHIATRIC: no change in mood stable  LYMPHATIC: no  "new lymphadenopathy  HEME: no bleeding or easy bruisability   NEURO: see HPI       Physical Exam:   Physical Exam   Vitals:  Height:5' 9\"  Weight:140 lbs 0 oz   Temp: 98.2  F (36.8  C) Temp src: Oral BP: 126/62 Pulse: 75   Resp: 16 SpO2: 97 % O2 Device: None (Room air)    General Appearance:  No acute distress, Looks much younger than stated age  Neuro:       Mental Status Exam:    Alert and oriented.  Language and speech intact for conversation.  Slight difficulty with repetition, slight word searching on rare occasion.   Oriented to current events.       Cranial Nerves:  Vision: able to count fingers both eyes; visual fields intact both eyes; PERRL, EOMI, face strength normal/symmetric; facial sensation intact bilaterally; no masseter or tongue deviation; hearing intact; Shoulder elevation intact.  Palate elevation intact.  Fundus:  Technically difficult.           Motor:   Tongue bulk and strength intact ×4           Reflexes:  Difficult to elicit at the ankles and knees, equal in the arms.  Plantar signs normal bilaterally       Sensory:  Vibration and cold sense equally intact in all four extremities                   Coordination:   Intact ×4       Gait:  Unable to be tested on emergency room cart-fall risk unclear   Neck: no nuchal rigidity, normal thyroid. No carotid bruits.    Cardiovascular: Regular rate and rhythm, no m/r/g    Extremities: No clubbing, no cyanosis, no edema       Data:   ROUTINE IP LABS   CBC RESULTS:     Recent Labs  Lab 08/13/17  0950   WBC 9.0   RBC 3.56*   HGB 12.5*   HCT 34.5*        Basic Metabolic Panel:   Recent Labs   Lab Test  08/13/17   0950   NA  126*   POTASSIUM  4.2   CHLORIDE  93*   CO2  27   BUN  20   CR  0.80   GLC  136*   VIRGEN  8.2*     INR:  Recent Labs   Lab Test  08/13/17   0950   INR  1.11             IMAGING:   All imaging studies were reviewed personally  CT HEAD W/O CONTRAST  8/13/2017 10:07 AM     HISTORY: Patient presents with aphasia. Rule out stroke.   "   TECHNIQUE: Scans were obtained through the head without IV contrast.   Radiation dose for this scan was reduced using automated exposure  control, adjustment of the mA and/or kV according to patient size, or  iterative reconstruction technique.     COMPARISON: None.     FINDINGS: Moderate atrophy. Moderate low density change in the white  matter of both hemispheres consistent with chronic small vessel  ischemic disease. Small amount of vascular calcification left M1  region. No hemorrhage, mass lesion, or focal area of acute infarction  identified. Paranasal sinuses are normal. No bony abnormality.         IMPRESSION:   1. Atrophy and chronic white matter disease. Nothing acute.  3. Left M1 calcification  CT ANGIOGRAM OF THE HEAD AND NECK WITHOUT AND WITH CONTRAST  8/13/2017  10:15 AM      HISTORY: Possible stroke with aphasia.     TECHNIQUE:  Precontrast localizing scans were followed by CT  angiography with an injection of 70 mL nonionic contrast material IV  with scans through the head and neck.  Images were transferred to a  separate 3-D workstation where multiplanar reformations and 3-D images  were created.  Estimates of carotid stenoses are made relative to the  distal internal carotid artery diameters except as noted.   Radiation  dose for this scan was reduced using automated exposure control,  adjustment of the mA and/or kV according to patient size, or iterative  reconstruction technique.     COMPARISON: Head CT today.     FINDINGS:    [place] of Eric: Negative. Large-caliber vessels are patent. Both  posterior cerebral arteries are supplied from the anterior circulation  which is a variant. The left vertebral artery is dominant. The  proximal right vertebral artery is occluded in the neck. The distal  right vertebral artery is small but ends in a PICA, which is a normal  variant.     Neck arteries: Proximal right vertebral artery is occluded.  Collaterals fill a small caliber distal right vertebral  artery  beginning at the C3 level. The right vertebral artery then ends in a  PICA. The left vertebral artery is patent and dominant. There is  calcified plaque at both carotid bifurcations. The right internal  carotid artery has approximately 25% stenosis at its origin, and the  left internal carotid artery has approximately 40% stenosis at its  origin.     The origin of the great vessels off the aortic arch are intact.         IMPRESSION:  1. Normal [place] of Eric including variants as described.  2. Chronically occluded right vertebral artery. Distal right vertebral  artery reconstitutes and ends in a PICA.  3. Mild to moderate stenosis of the origin of both internal carotid  arteries  CT BRAIN PERFUSION 8/13/2017 10:24 AM     HISTORY: Stroke symptoms with word finding difficulty.     TECHNIQUE: Time sequential axial CT images of the head were acquired  during the administration of intravenous contrast 50 mL [id number]-370.  CTA images of the [place] of [place] as well as color perfusion maps of  the brain were created from this time sequential axial source data.  Radiation dose for this scan was reduced using automated exposure  control, adjustment of the mA and/or kV according to patient size, or  iterative reconstruction technique.     COMPARISON: Head CT today.     FINDINGS: There are no focal or regional perfusion defects in the  brain.         IMPRESSION: Normal CT perfusion of the brain

## 2017-08-13 NOTE — ED PROVIDER NOTES
"  History     Chief Complaint:  Speech difficulty      HPI   Corwin Puentes is a 89 year old male with a history of hypertension who presents with speech difficulty.  The patient's wife noticed the patient had difficulty finishing his thoughts and seems to be searching for words which is different from baseline as he is quite sharp.  He was last seen as normal last night before bed.  The bath rug was disturbed when his wife got up around 0330 and she was concerned the patient could have fallen however he was in bed asleep at that time.  He recalls getting up during the night but does not recall having a fall.  He had a headache last night but denies this today and has no other areas of pain.      Allergies:  Alendronic acid  Risedronic acid      Medications:    Aspirin  Metoprolol  Atorvastatin   Loratadine   Hydrocortisone cream  Metronidazole gel  Mupirocin ointment    Past Medical History:    Coronary artery disease  Hypertension  Hyperlipidemia   Thrombocytopenia   Anxiety  Osteoporosis     Past Surgical History:    Total knee arthroplasty     Family History:    History reviewed. No pertinent family history.     Social History:  Marital Status:   Presents to the ED with his wife, daughter, and son    Review of Systems   Constitutional: Negative for fever.   Respiratory: Negative for shortness of breath.    Cardiovascular: Negative for chest pain.   Neurological: Positive for dizziness, speech difficulty and headaches. Negative for weakness and numbness.   All other systems reviewed and are negative.    Physical Exam   First Vitals:  BP: 126/62  Pulse: 75  Temp: 98.2  F (36.8  C)  Resp: 16  Height: 175.3 cm (5' 9\")  Weight: 63.5 kg (140 lb)  SpO2: 97 %      Physical Exam  General: Patient is alert and normal appearing.  HEENT: Head atraumatic    Eyes: pupils equal and reactive. Conjunctiva clear   Nares: patent   Oropharynx: no lesions, uvula midline, no palatal draping, normal voice, no trismus  Neck: " Supple without lymphadenopathy, no meningismus  Chest: Heart regular rate and rhythm.   Lungs: Equal clear to auscultation with no wheeze or rales  Abdomen: Soft, non tender, nondistended, normal bowel sounds  Back: No costovertebral angle tenderness, no midline C, T or L spine tenderness  Neuro: Grossly nonfocal, normal speech, strength equal bilaterally, CN 2-12 intact, mild expressive aphasia, no pronator drift, normal finger to nose  Extremities: No deformities, equal radial and DP pulses. No clubbing, cyanosis.  No edema  Skin: Warm and dry with no rash.       Emergency Department Course   ECG:  @ 1023  Indication: aphasia  Vent. Rate 69 bpm. MD interval 180 ms. QRS duration 88 ms. QT/QTc 398/426 ms. P-R-T axis 74 80 66.   Sinus rhythm with premature atrial complexes. Otherwise normal ECG.    Read @ 1043 by Dr. Key.     Imaging:  Head CT without contrast:  1. Atrophy and chronic white matter disease. Nothing acute.  2. Left M1 calcification.  Report per radiology.      Head CT with contrast:   Normal CT perfusion of the brain.  Report per radiology.     Head/Neck CT angiogram without and with contrast:  1. Normal [place] including variants as described.  2. Chronically occluded right vertebral artery. Distal right vertebral artery reconstitutes and ends in a PICA.  3. Mild to moderate stenosis of the origin of both internal carotid arteries.  Report per radiology.     Radiographic findings were communicated with the patient and family who voiced understanding of the findings.    Laboratory:  CBC: RBC 3.56 (L), HGB 12.5 (L), HCT 34.5 (L), MCH 35.1 (H), otherwise WNL (WBC 9, )   BMP: Na 126 (L), Cl 93 (L), Glucose 136 (H), Ca 8.2 (L), otherwise WNL (Creatinine 0.8)  (1005) Glucose by meter: 135 (H)   Troponin I: <0.015   INR: 1.11  PTT: 29    UA: Slightly cloudy yellow urine, Specific Gravity 1.046 (H), Blood trace, pH 7.5 (H), RBC/HPF 17 (H), Amorphous Crystals few, otherwise WNL      Interventions:  (0955) Normal Saline, 500 mL, IV bolus      Emergency Department Course:  Nursing notes and vitals reviewed.  (0943) I performed an exam of the patient as documented above. GCS 15.    (0949) Code stroke was called based on the patient's history and symptoms on presentation.    A peripheral IV was established.  Blood was drawn and sent for laboratory testing, results as above. The patient was placed on continuous cardiac monitoring and pulse oximetry.     (0952) I spoke with Dr. Yates of neurology regarding the patient.     The patient was sent for a head CT and head/neck CT angiogram while in the emergency department, findings above.      (1023) I spoke with Dr. Dee of radiology regarding the results of the patient's CT scan.      EKG was done, interpretation as above.     (1041) I again spoke with Dr. Yates of neurology, CT results discussed.    Findings and plan explained to the patient and family who consents to admission.   (1048) I discussed the patient with Dr. Pollard of the hospitalist service, who will admit the patient to a neuro bed for further monitoring, evaluation, and treatment.     Urine sample was obtained and sent for laboratory analysis, findings above.     Impression & Plan      CMS Diagnoses: The patient has stroke symptoms:           ED Stroke specific documentation           NIHSS PDF          Protocol PDF     Patient last known well time: last night before bed  ED Provider first to bedside at: 0943  CT Results received at: 1023  Patient was not treated with TPA due to the following reason(s):  Mild stroke symptoms ( NIHSS < 4 and not globally aphasic)  Wake up stroke with unknown time of onset    National Institutes of Health Stroke Scale (Baseline)  Time Performed: 0943      Score    Level of consciousness: (0)   Alert, keenly responsive    LOC questions: (0)   Answers both questions correctly    LOC commands: (0)   Performs both tasks correctly    Best gaze:  (0)   Normal    Visual: (0)   No visual loss    Facial palsy: (0)   Normal symmetrical movements    Motor arm (left): (0)   No drift    Motor arm (right): (0)   No drift    Motor leg (left): (0)   No drift    Motor leg (right): (0)   No drift    Limb ataxia: (0)   Absent    Sensory: (0)   Normal- no sensory loss    Best language: (1)   Mild to moderate aphasia    Dysarthria: (0)   Normal    Extinction and inattention: (0)   No abnormality        Total Score:  1        Stroke Mimics were considered (including migraine headache, seizure disorder, hypoglycemia (or hyperglycemia), head or spinal trauma, CNS infection, Toxin ingestion and shock state (e.g. sepsis) .     Medical Decision Makin year old male presents to the emergency department with expressive aphasia.  He was noted to be that way upon waking by his wife; otherwise acting normal and ambulating normally.  Denies any change in his vision.  There is no dysarthria, just an expressive aphasia.  He has no numbness, weakness, tingling on one side of his body.  He felt a little dizzy going to be last night and had a headache; that is resolved this morning.  Here in the emergency department, Code stroke was called due to him being a wake up stroke.  He went for a CT and CTA. I discussed with Dr. Yates.  He had a normal CT perfusion of the brain.  He has a chronically occluded right vertebral artery and mild stenosis of both internal carotids but no acute occlusion.  Patient continues to have an expressive aphasia.  He does have a mild hyponatremia which is not new.  He is often in the 130's, today he is 126.  I do not suspect this is causing his expressive aphasia.  Will plan to admit him to the neurology floor, natasha a MRI and neurology consultation, and continue to monitor for any worsening symptoms.  Patient and family are agreeable with this and all questions/concerns addressed.    Diagnosis:    ICD-10-CM    1. Expressive aphasia R47.01       Disposition:  Admitted      I, Patricia Odell, am serving as a scribe on 8/13/2017 at 9:43 AM to personally document services performed by Dr. Key based on my observations and the provider's statements to me.    Patricia Odell  8/13/2017    EMERGENCY DEPARTMENT       Shannan Key MD  08/13/17 5533

## 2017-08-13 NOTE — IP AVS SNAPSHOT
[facility]    [address]    [place] [address]    Phone:  [phone]                                       After Visit Summary   8/13/2017    Corwin Puentes    MRN: [id number]           After Visit Summary Signature Page     I have received my discharge instructions, and my questions have been answered. I have discussed any challenges I see with this plan with the nurse or doctor.    ..........................................................................................................................................  Patient/Patient Representative Signature      ..........................................................................................................................................  Patient Representative Print Name and Relationship to Patient    ..................................................               ................................................  Date                                            Time    ..........................................................................................................................................  Reviewed by Signature/Title    ...................................................              ..............................................  Date                                                            Time

## 2017-08-13 NOTE — PLAN OF CARE
Problem: Goal Outcome Summary  Goal: Goal Outcome Summary  Outcome: No Change  A/O, VSS, A1 w GB,  Speech a bit slow, slight asymmetrical smile and poss slight tongue deviation. Waiting for MRI and echo, tele SB, cont to monitor

## 2017-08-13 NOTE — PROGRESS NOTES
08/13/17 1500   General Information, SLP   Type of Evaluation Speech and Language;Cognitive-Linguistic   Type of Visit Initial   Start of Care Date 08/13/17   Onset of Illness/Injury or Date of Surgery - Date 08/13/17   Referring Physician Natalie Jenkins MD   Patient/Family Goals Statement none stated   Pertinent History of Current Problem see alina olivia 8/13.    Language: Auditory Comprehension (understanding of spoken language)   Comments (Auditory Comprehension) see clinical impressions below   Language: Verbal Expression (use of spoken language to express information)   Comments (Verbal Expression) see clinical impressions below   Pragmatics (the social or functional use of a language)   Comments (Pragmatics) see clinical impressions below   Cognitive Status Examination   Attention intact   Behavioral Observations distractible   Orientation Ox3   Short Term Memory impaired   Long Term Memory intact   Executive Function Deficits Noted insight/awareness;mental flexibility;self-correction;self-monitoring   General Therapy Interventions   Planned Therapy Interventions Language;Cognitive Treatment;Communication   Clinical Impression, SLP [place]   Criteria for Skilled Therapeutic Interventions Met Yes   SLP Diagnosis mild-mod cognitive linguistic deficits   Influenced by the following factors/impairments insight, awareness, memory, incorporating new information   Rehab Potential Good, to achieve stated therapy goals   Rehab potential affected by nothing   Therapy Frequency 5 times/wk   Predicted Duration of Therapy Intervention (days/wks) 1 wk   Anticipated Discharge Disposition Home with Assist   Risks and Benefits of Treatment have been explained. Yes   Patient, Family & other staff in agreement with plan of care Yes   Clinical Impression Comments Speech and language findings showed deficits in memory, word generation, insight and following directions. Pt was oriented to name, place and month +year. 1 step  "directions 100%, 2 and 3-step directions 0% when pt was told to wait until therapist said \"go\", despite much verbal modeling and repetitions. This improved to 100% when the SLP changed the modality and instructed pt to wait until he heard the last direction, also intermittently providing visual cues by holding up one or two fingers. Memory and inference/insight based on a spoken story was 60% at I level. Word generation in given categories varied from quickly naming 5 items (types of cars) in 10 sec, to having difficulty naming three items (fruit) in 30 sec. Pt was able to determine an object and state its name based on verbal clues with 100%.   Total Evaluation Time      Total Evaluation Time (Minutes) 20     "

## 2017-08-13 NOTE — PROGRESS NOTES
08/13/17 1400   General Information   Onset Date 08/13/17   Start of Care Date 08/13/17   Referring Physician Natalie Jenkins MD   Patient/Family Goals Statement None stated   Swallowing Evaluation Bedside swallow evaluation   Behaviorial Observations WFL (within functional limits)   Mode of current nutrition NPO   Comments Pt presents with some expressive aphasia to the ED this am, but no hx of CVA and currently the CT scans are not showing any acute CVA. His family is present and states that his expressive language is much improved from this am.    Clinical Swallow Evaluation   Oral Musculature generally intact   Structural Abnormalities none present   Dentition present and adequate   Mucosal Quality good   Mandibular Strength and Mobility intact   Oral Labial Strength and Mobility WFL   Lingual Strength and Mobility WFL   Velar Elevation intact   Buccal Strength and Mobility intact   Additional Documentation Yes   Clinical Swallow Eval: Thin Liquid Texture Trial   Mode of Presentation, Thin Liquids cup   Volume of Liquid or Food Presented 1/2 c   Oral Phase of Swallow WFL   Pharyngeal Phase of Swallow intact   Diagnostic Statement No deficits   Clinical Swallow Eval: Puree Solid Texture Trial   Mode of Presentation, Puree spoon   Volume of Puree Presented 1/4 cup   Oral Phase, Puree WFL   Pharyngeal Phase, Puree intact   Diagnostic Statement No deficits   Clinical Swallow Eval: Solid Food Texture Trial   Mode of Presentation, Solid self-fed   Volume of Solid Food Presented 2 bites   Oral Phase, Solid WFL   Pharyngeal Phase, Solid intact   Diagnostic Statement No deficits   Swallow Compensations   Swallow Compensations No compensations were used   Esophageal Phase of Swallow   Patient reports or presents with symptoms of esophageal dysphagia No   General Therapy Interventions   Planned Therapy Interventions Language;Communication;Cognitive Treatment   Intervention Comments No dysphagia tx needed   Swallow  Eval: Clinical Impressions   Skilled Criteria for Therapy Intervention Skilled criteria met.  Treatment indicated.   Functional Assessment Scale (FAS) 4   Treatment Diagnosis mild mod cognitive linguistic deficits   Diet texture recommendations Regular diet;Thin liquids   Recommended Feeding/Eating Techniques maintain upright posture during/after eating for 30 mins   Therapy Frequency 5 times/wk   Predicted Duration of Therapy Intervention (days/wks) 1 wk   Anticipated Discharge Disposition home w/ assist   Risks and Benefits of Treatment have been explained. Yes   Patient, family and/or staff in agreement with Plan of Care Yes   Clinical Impression Comments Pt seen in his room with family present. Although he did have expressive aphasia this am, they report he is much improved now. All deny that he has had difficulty with eating and drinking since the onset of his symptoms. Trials of thins, purees and solids given with no overt s/sx of aspiration. Timely onset of hyolaryngeal elevation and no voice changes following trials. No oral residue.

## 2017-08-13 NOTE — PLAN OF CARE
"Problem: Goal Outcome Summary  Goal: Goal Outcome Summary  Discharge Planner SLP   Patient plan for discharge: Home     Current status: Pt seen in his room with family present. Although he did have expressive aphasia this am, they report he is much improved now. All deny that he has had difficulty with eating and drinking since the onset of his symptoms. Trials of thins, purees and solids given with no overt s/sx of aspiration. Timely onset of hyolaryngeal elevation and no voice changes following trials. No oral residue. Speech and language findings showed deficits in memory, word generation, insight and following directions. Pt was oriented to name, place and month +year. 1 step directions 100%, 2 and 3-step directions 0% when pt was told to wait until therapist said \"go\", despite much verbal modeling and repetitions. This improved to 100% when the SLP changed the modality and instructed pt to wait until he heard the last direction, also intermittently providing visual cues by holding up one or two fingers. Memory and inference/insight based on a spoken story was 60% at I level. Word generation in given categories varied from quickly naming 5 items (types of cars) in 10 sec, to having difficulty naming three items (fruit) in 30 sec. Pt was able to determine an object and state its name based on verbal clues with 100%. RECS: Regular diet and thin liquids; standard aspiration precautions. Speech language tx for memory, making inferences and incorporating new information in the form of following directions.  Barriers to return to prior living situation: Mental status  Recommendations for discharge: Home with asssitance  Rationale for recommendations: Pt requires additional cognitive support at this time.        Entered by: Darcie Hayes 08/13/2017 2:52 PM             "

## 2017-08-14 ENCOUNTER — APPOINTMENT (OUTPATIENT)
Dept: CARDIOLOGY | Facility: CLINIC | Age: 82
End: 2017-08-14
Attending: INTERNAL MEDICINE
Payer: MEDICARE

## 2017-08-14 ENCOUNTER — APPOINTMENT (OUTPATIENT)
Dept: SPEECH THERAPY | Facility: CLINIC | Age: 82
End: 2017-08-14
Payer: MEDICARE

## 2017-08-14 ENCOUNTER — APPOINTMENT (OUTPATIENT)
Dept: OCCUPATIONAL THERAPY | Facility: CLINIC | Age: 82
End: 2017-08-14
Attending: INTERNAL MEDICINE
Payer: MEDICARE

## 2017-08-14 VITALS
DIASTOLIC BLOOD PRESSURE: 60 MMHG | OXYGEN SATURATION: 98 % | HEART RATE: 75 BPM | SYSTOLIC BLOOD PRESSURE: 105 MMHG | HEIGHT: 69 IN | RESPIRATION RATE: 16 BRPM | TEMPERATURE: 97.6 F | WEIGHT: 139.33 LBS | BODY MASS INDEX: 20.64 KG/M2

## 2017-08-14 LAB
ANION GAP SERPL CALCULATED.3IONS-SCNC: 5 MMOL/L (ref 3–14)
BUN SERPL-MCNC: 12 MG/DL (ref 7–30)
CALCIUM SERPL-MCNC: 8 MG/DL (ref 8.5–10.1)
CHLORIDE SERPL-SCNC: 99 MMOL/L (ref 94–109)
CO2 SERPL-SCNC: 24 MMOL/L (ref 20–32)
CREAT SERPL-MCNC: 0.65 MG/DL (ref 0.66–1.25)
DEPRECATED CALCIDIOL+CALCIFEROL SERPL-MC: 26 UG/L (ref 20–75)
ERYTHROCYTE [DISTWIDTH] IN BLOOD BY AUTOMATED COUNT: 14.5 % (ref 10–15)
GFR SERPL CREATININE-BSD FRML MDRD: ABNORMAL ML/MIN/1.7M2
GLUCOSE SERPL-MCNC: 98 MG/DL (ref 70–99)
HCT VFR BLD AUTO: 32.7 % (ref 40–53)
HGB BLD-MCNC: 11.6 G/DL (ref 13.3–17.7)
MCH RBC QN AUTO: 34.7 PG (ref 26.5–33)
MCHC RBC AUTO-ENTMCNC: 35.5 G/DL (ref 31.5–36.5)
MCV RBC AUTO: 98 FL (ref 78–100)
PLATELET # BLD AUTO: 148 10E9/L (ref 150–450)
POTASSIUM SERPL-SCNC: 4.1 MMOL/L (ref 3.4–5.3)
RBC # BLD AUTO: 3.34 10E12/L (ref 4.4–5.9)
SODIUM SERPL-SCNC: 128 MMOL/L (ref 133–144)
WBC # BLD AUTO: 5.6 10E9/L (ref 4–11)

## 2017-08-14 PROCEDURE — 97165 OT EVAL LOW COMPLEX 30 MIN: CPT | Mod: GO

## 2017-08-14 PROCEDURE — 25000128 H RX IP 250 OP 636: Performed by: INTERNAL MEDICINE

## 2017-08-14 PROCEDURE — 97532 ZZHC SP COGNITIVE SKILLS EA 15 MIN: CPT | Mod: GN | Performed by: SPEECH-LANGUAGE PATHOLOGIST

## 2017-08-14 PROCEDURE — 93306 TTE W/DOPPLER COMPLETE: CPT | Mod: 26 | Performed by: INTERNAL MEDICINE

## 2017-08-14 PROCEDURE — 99217 ZZC OBSERVATION CARE DISCHARGE: CPT | Performed by: INTERNAL MEDICINE

## 2017-08-14 PROCEDURE — 40000225 ZZH STATISTIC SLP WARD VISIT: Performed by: SPEECH-LANGUAGE PATHOLOGIST

## 2017-08-14 PROCEDURE — 97530 THERAPEUTIC ACTIVITIES: CPT | Mod: GO

## 2017-08-14 PROCEDURE — 99207 ZZC CDG-CODE CATEGORY CHANGED: CPT | Performed by: INTERNAL MEDICINE

## 2017-08-14 PROCEDURE — G0378 HOSPITAL OBSERVATION PER HR: HCPCS

## 2017-08-14 PROCEDURE — 80048 BASIC METABOLIC PNL TOTAL CA: CPT | Performed by: INTERNAL MEDICINE

## 2017-08-14 PROCEDURE — 85027 COMPLETE CBC AUTOMATED: CPT | Performed by: INTERNAL MEDICINE

## 2017-08-14 PROCEDURE — 25000132 ZZH RX MED GY IP 250 OP 250 PS 637: Performed by: INTERNAL MEDICINE

## 2017-08-14 PROCEDURE — 40000133 ZZH STATISTIC OT WARD VISIT

## 2017-08-14 PROCEDURE — 27210995 ZZH RX 272: Performed by: INTERNAL MEDICINE

## 2017-08-14 PROCEDURE — 93306 TTE W/DOPPLER COMPLETE: CPT

## 2017-08-14 PROCEDURE — 97535 SELF CARE MNGMENT TRAINING: CPT | Mod: GO

## 2017-08-14 PROCEDURE — 40000893 ZZH STATISTIC PT IP EVAL DEFER

## 2017-08-14 PROCEDURE — 36415 COLL VENOUS BLD VENIPUNCTURE: CPT | Performed by: INTERNAL MEDICINE

## 2017-08-14 RX ORDER — MAGNESIUM HYDROXIDE 1200 MG/15ML
30 LIQUID ORAL ONCE
Status: COMPLETED | OUTPATIENT
Start: 2017-08-14 | End: 2017-08-14

## 2017-08-14 RX ADMIN — SODIUM CHLORIDE: 9 INJECTION, SOLUTION INTRAVENOUS at 11:22

## 2017-08-14 RX ADMIN — SODIUM CHLORIDE 30 ML: 0.9 IRRIGANT IRRIGATION at 10:58

## 2017-08-14 RX ADMIN — SODIUM CHLORIDE: 9 INJECTION, SOLUTION INTRAVENOUS at 00:21

## 2017-08-14 RX ADMIN — ASPIRIN 325 MG: 325 TABLET, DELAYED RELEASE ORAL at 08:20

## 2017-08-14 ASSESSMENT — VISUAL ACUITY
OU: NORMAL ACUITY

## 2017-08-14 ASSESSMENT — ACTIVITIES OF DAILY LIVING (ADL): PREVIOUS_RESPONSIBILITIES: HOUSEKEEPING;MEDICATION MANAGEMENT;FINANCES;DRIVING

## 2017-08-14 NOTE — PROVIDER NOTIFICATION
MD Notification    Notified Person:  MD    Notified Persons Name: Latrice,     Notification Date/Time: 8/14/17 14:17    Notification Interaction:  Talked with Physician    Purpose of Notification: asking for outpatient OT.SLP for cognition.      Orders Received:    Comments:

## 2017-08-14 NOTE — PROGRESS NOTES
[facility]  Neuroscience and Spine [place]  Neurology Daily Note      Admission Date:8/13/2017   Date of service: 08/14/2017   Hospital Day: 2      Assessment & Plan   _______________________________  #. (I63.9) Occipital stroke (H)  (primary encounter diagnosis)  --tiny left occipital infarct with hemorrhagic conversion  ----likely additional tiny left temporal infarct that caused speech abnormality  --echo pending  --A1c 5.5  --TSH normal  --lipids normal with LDL 45 on low dose statin PTA  --vitamin D pending  --on aspirin 81 mg daily PTA  -----increased to 325 mg daily for now  #. (R47.01) Expressive aphasia  --due to stroke, nearly resolved  #. (E87.1) Hyponatremia  --management per hospitalist  #. PT/OT/Speech  --continue evaluations  #. Nutrition  --Per speech therapy evaluation   #. DVT Prophylaxis  --defer to primary service    Code Status: Full Code    Disposition: pending stroke workup completion    Interval History   _______________________________  Patient presented with acute onset language and speaking difficulties on waking. Symptoms gradually improving in the ED. History of TIAs in the past.   Patient feels back to normal today. No focal neurological deficits noted. MRI with tiny left occipital infarct. Echo pending.     Review of Systems   _______________________________  The Review of Systems is negative other than noted in the HPI  Physical Exam   _______________________________  Vitals: Temp: 97.5  F (36.4  C) Temp src: Oral BP: 113/65   Heart Rate: 59 Resp: 16 SpO2: 98 % O2 Device: None (Room air)    Vital Signs with Ranges: Temp:  [97.5  F (36.4  C)-97.8  F (36.6  C)] 97.5  F (36.4  C)  Heart Rate:  [56-70] 59  Resp:  [9-33] 16  BP: (106-121)/(62-80) 113/65  SpO2:  [98 %-99 %] 98 %    General Appearance:  No acute distress  Neuro:       Mental Status Exam:   Awake, alert, oriented X3. Speech and language are intact. Mental status is normal       Cranial Nerves:  Pupils 3 mm,  reactive. EOMI. Face sensation is normal. Face is symmetric. Tongue and uvula are midline. Other CN are normal           Motor:  5/5 X 4. Tone and bulk are normal           Reflexes:  Normal DTR. Toes downgoing.        Sensory:  Normal to light touch                   Coordination:   Intact finger-to-nose        Gait:  Up with assistance  Cardiovascular: Regular rate and rhythm, no m/r/g  Lungs: Clear to auscultation  Abdomen: Soft, not tender, not distended  Extremities: No clubbing, no cyanosis, no edema    Medications   _______________________________    NaCl 80 mL/hr at 08/13/17 1101     - MEDICATION INSTRUCTIONS -       - MEDICATION INSTRUCTIONS -       NaCl 100 mL/hr at 08/14/17 0816       sodium chloride (PF)  3 mL Intracatheter Q8H     aspirin EC  325 mg Oral Daily    Or     aspirin  300 mg Rectal Daily     sodium chloride (PF)  10 mL Intracatheter Q8H       Data   _______________________________      Lab Data:   All data was reviewed by me personally  CBC RESULTS:  Recent Labs   Lab Test  08/14/17   0812  08/13/17   0950   WBC  5.6  9.0   RBC  3.34*  3.56*   HGB  11.6*  12.5*   HCT  32.7*  34.5*   PLT  148*  164     Basic Metabolic Panel:  Recent Labs   Lab Test  08/14/17   0812  08/13/17   1845  08/13/17   0950   NA  128*  128*  126*   POTASSIUM  4.1   --   4.2   CHLORIDE  99   --   93*   CO2  24   --   27   BUN  12   --   20   CR  0.65*   --   0.80   GLC  98   --   136*   VIRGEN  8.0*   --   8.2*     Coagulation  Recent Labs   Lab Test  08/13/17   0950   INR  1.11   PTT  29      Lipid Profile:  Recent Labs   Lab Test  08/13/17   1500   CHOL  87   HDL  45   LDL  35   TRIG  33     Thyroid Panel:  Recent Labs   Lab Test  08/13/17   1500   TSH  1.81      Vitamin B12: No lab results found.   Vitamin D level: No lab results found.  A1C:   Recent Labs   Lab Test  08/13/17   1500   A1C  5.5     Troponin I:   Recent Labs   Lab Test  08/13/17   2230  08/13/17   1500  08/13/17   0950   TROPI  <0.015  The 99th  percentile for upper reference range is 0.045 ug/L.  Troponin values in   the range of 0.045 - 0.120 ug/L may be associated with risks of adverse   clinical events.    <0.015  The 99th percentile for upper reference range is 0.045 ug/L.  Troponin values in   the range of 0.045 - 0.120 ug/L may be associated with risks of adverse   clinical events.    <0.015  The 99th percentile for upper reference range is 0.045 ug/L.  Troponin values in   the range of 0.045 - 0.120 ug/L may be associated with risks of adverse   clinical events.       CRP inflammation:   Recent Labs   Lab Test  08/13/17   1500   CRP  <2.9     UA Results:  Recent Labs   Lab Test  08/13/17   1050   COLOR  Yellow   APPEARANCE  Slightly Cloudy   URINEGLC  Negative   URINEBILI  Negative   URINEKETONE  Negative   SG  1.046*   UBLD  Trace*   URINEPH  7.5*   PROTEIN  Negative   NITRITE  Negative   LEUKEST  Negative   RBCU  17*   WBCU  0        Cardiac US:   Pending        Imaging:   All imaging studies were reviewed personally  CT head 8/13/17:   1. Atrophy and chronic white matter disease. Nothing acute.  3. Left M1 calcification.    CTA head/neck 8/13/17:   1. Normal [place] of [place] including variants as described.  2. Chronically occluded right vertebral artery. Distal right vertebral artery reconstitutes and ends in a PICA.  3. Mild to moderate stenosis of the origin of both internal carotid Arteries.    CTP head 8/13/17:  --Normal CT perfusion of the brain.    MRI brain 8/13/17:   1. Small cortical infarct within the left occipital lobe without hemorrhagic conversion. No significant mass effect or midline shift.  2. There is generalized atrophy of the brain. White matter changes are present in the cerebral hemispheres that are consistent with small vessel ischemic disease in this age patient.           Shannon Davis, FNP-BC

## 2017-08-14 NOTE — PLAN OF CARE
Problem: Goal Outcome Summary  Goal: Goal Outcome Summary  Speech Language Therapy Discharge Summary     Reason for therapy discharge:    Discharged to home with outpatient therapy.     Progress towards therapy goal(s). See goals on Care Plan in [facility] electronic health record for goal details.  Goals not met.  Barriers to achieving goals:   discharge from facility.     Therapy recommendation(s):    Continued therapy is recommended.  Rationale/Recommendations:  Pt would benefit from continued ST to target cognitive-linguistic deficits. Pt and family agreeable and would like to stay within [place]. .

## 2017-08-14 NOTE — PLAN OF CARE
Problem: Goal Outcome Summary  Goal: Goal Outcome Summary  Outcome: Improving  Pt A&O x4, naga HR otherwise VSS. Tele SBD. LS clear. CMS and Neuros intact. Up SBA w/ GB to bathroom. Voiding. +BS, passing flatus. ECHO completed, neg bubble. Discharging to home today with OP OT and then SLP for cognition. Family providing transportation. Discharge follow-up care and med teaching given.

## 2017-08-14 NOTE — PROGRESS NOTES
"Met with the patient at the bedside regarding \"what is observation\" brochure and GARCIA.  Patient is currently pending Echo results and will most likely d/c to home with OP PT.  Patient has support at home.  He goes between [place] and [place] for the year and does not currently have a Primary Care close to home.  Established care with [facility] and follow up is in the AVS.  Answered all questions.  Patient and family have no further d/c planning needs at this time.      Addendum: patient requires a one month follow up with Dr. Robison/Shannon.  Message to Sandeep to contact the patient with date and time put clinic info on AVS  "

## 2017-08-14 NOTE — PLAN OF CARE
Problem: Goal Outcome Summary  Goal: Goal Outcome Summary  PT: Orders received, chart reviewed. Per conversation with OT, no skilled PT needs identified at this time. Will complete order.

## 2017-08-14 NOTE — PLAN OF CARE
Problem: Goal Outcome Summary  Goal: Goal Outcome Summary  OT: Order received, eval completed and treatment initiated. Pt is a 89 year old male admitted under observation status due to suspected left MCA TIA, initial evaluation was un-revealing for acute ischemic stroke on brain imaging. Pt lives with spouse in ILF, no stairs, walk in shower (owns shower chair), standard toilet. Pt/spouse also have 2 story home in [place] with stairs. Pt reports indep in all ADLs, IADLs including driving, med mgmt and financial mgmt, and mobility tasks with no AD at baseline.     Discharge Planner OT   Patient plan for discharge: Home with spouse  Current status: Pt completed transfers/mobility in room and hallway with SBA, no LOB - pt reports ambulating at baseline level of functioning. Pt required assist with management of IV pole, at times, forgetting IV pole/line present. Pt completed ADL tasks with SBA. SLUMS cognitive screen completed with pt scoring 20/30 placing pt in severe cognitive deficit/dementia category (21 = mild cognitive deficit). Pt with difficulty in areas of STM/recall (5/5 immediate recall, 2/5 delayed recall, 3/3 with general cue, 3/4 story questions), reverse/auditory memory, clock draw (inaccurate time). Pt educated on results of screen, denies changes to cognition.   Barriers to return to prior living situation: None anticipated  Recommendations for discharge: Home with spouse and OP OT  Rationale for recommendations: Pt would benefit from OP OT for ongoing cognitive re-training due to current deficits as pt independent with higher level IADLs at baseline, including driving.        Entered by: Amanda Adams 08/14/2017 10:16 AM

## 2017-08-14 NOTE — PROGRESS NOTES
"[facility]    Hospitalist Progress Note    Assessment & Plan   Corwin Puentes is a 89 year old male who was admitted on 8/13/2017.    Corwin Puentes is a very pleasant 89-year-old male with a history of known abdominal aortic aneurysm followed at [facility], hypertension, hyperlipidemia and a history of supraventricular tachycardia treated with metoprolol.  He is very active and does walking and stationary bicycle at home almost every day.  He presented with some expressive aphasia that is mostly related to word finding difficulties and difficulty finishing his sentences.  The patient got a baby aspirin at home.  He was started on a rectal aspirin in the emergency room     cerebrovascular accident versus transient ischemic attack.   -MRI showed small cortical infarct within the left occipital lobe without hemorrhagic  Conversion.  No mass effect or midline shift.  White matter changes consistent with small vessel ischemic disease.   -echo with neg bubble study  -neuro has seen and has changed the dose of aspirin from 81 mg to 325 mg.  -will have the pt follow-up with neuro in one month  -his expressive aphasia has resolved  - Addendum: HR in the low to mid 50's, will hold metoprolol and recheck pulse at PMD.  Further metoprolol decision at that time.  Will also have outpatient speech and OT for cognitive eval.    Speech noted cognitive deficits: Speech and language findings showed deficits in memory, word generation, insight and following directions. Pt was oriented to name, place and month +year. 1 step directions 100%, 2 and 3-step directions 0% when pt was told to wait until therapist said \"go\", despite much verbal modeling and repetitions. This improved to 100% when the SLP changed the modality and instructed pt to wait until he heard the last direction, also intermittently providing visual cues by holding up one or two fingers. Memory and inference/insight based on a spoken story was 60% at I " level. Word generation in given categories varied from quickly naming 5 items (types of cars) in 10 sec, to having difficulty naming three items (fruit) in 30 sec. Pt was able to determine an object and state its name based on verbal clues with 100%.     Hypertension.  -BP actually well controlled during the admission off metoprolol    Bradycardia, will  Continue to hold metoprolol as in addendum.     History of known abdominal aortic aneurysm.  The ultrasound from [facility] did show a mural thrombus but apparently the size was stable compared with 2014.      History of supraventricular tachycardia followed at [facility] Cardiology.      Hyponatremia.  Per pt he has a long history of stable hyponatremia    Hyperlipidemia.  Again, the patient is under good control.  In 2015 at [facility], his cholesterol was 97, triglycerides 46, HDL 52, LDL of 36.     History of anxiety for which apparently the patient is on a low dose of Ativan.      Code status was discussed and the patient is full code.     Deep venous thrombosis prophylaxis:  The patient will be on SCDs.       DISPOSITION: discharge today       Natalie Polalrd MD    Interval History   No new sx, expressive aphasia has resolved    -Data reviewed today: I reviewed all new labs and imaging results over the last 24 hours. I personally reviewed no images or EKG's today.    Physical Exam   Temp: 97.5  F (36.4  C) Temp src: Oral BP: 113/65   Heart Rate: 59 Resp: 16 SpO2: 98 % O2 Device: None (Room air)    Vitals:    08/13/17 0923 08/14/17 0356   Weight: 63.5 kg (140 lb) 63.2 kg (139 lb 5.3 oz)     Vital Signs with Ranges  Temp:  [97.5  F (36.4  C)-97.8  F (36.6  C)] 97.5  F (36.4  C)  Heart Rate:  [56-68] 59  Resp:  [9-33] 16  BP: (106-120)/(62-80) 113/65  SpO2:  [98 %-99 %] 98 %  I/O last 3 completed shifts:  In: 1450 [P.O.:700; I.V.:750]  Out: 300 [Urine:300]    Constitutional: alert   Respiratory: clear to auscultation, no wheezing or rhonchi    Cardiovascular: regular rate and rhythm without murmer or rub   GI:positive bowel sounds, non-tender   Skin/Integumen: no rashes    Other:        Medications     NaCl 80 mL/hr at 08/13/17 1101     - MEDICATION INSTRUCTIONS -       - MEDICATION INSTRUCTIONS -       NaCl 100 mL/hr at 08/14/17 0816       sodium chloride (PF)  3 mL Intracatheter Q8H     aspirin EC  325 mg Oral Daily    Or     aspirin  300 mg Rectal Daily     sodium chloride (PF)  10 mL Intracatheter Q8H       Data     Recent Labs  Lab 08/14/17  0812 08/13/17  2230 08/13/17  1845 08/13/17  1500 08/13/17  0950   WBC 5.6  --   --   --  9.0   HGB 11.6*  --   --   --  12.5*   MCV 98  --   --   --  97   *  --   --   --  164   INR  --   --   --   --  1.11   *  --  128*  --  126*   POTASSIUM 4.1  --   --   --  4.2   CHLORIDE 99  --   --   --  93*   CO2 24  --   --   --  27   BUN 12  --   --   --  20   CR 0.65*  --   --   --  0.80   ANIONGAP 5  --   --   --  6   VIRGEN 8.0*  --   --   --  8.2*   GLC 98  --   --   --  136*   TROPI  --  <0.015The 99th percentile for upper reference range is 0.045 ug/L.  Troponin values in the range of 0.045 - 0.120 ug/L may be associated with risks of adverse clinical events.  --  <0.015The 99th percentile for upper reference range is 0.045 ug/L.  Troponin values in the range of 0.045 - 0.120 ug/L may be associated with risks of adverse clinical events. <0.015The 99th percentile for upper reference range is 0.045 ug/L.  Troponin values in the range of 0.045 - 0.120 ug/L may be associated with risks of adverse clinical events.       Recent Results (from the past 24 hour(s))   MR Brain w/o & w Contrast    Narrative    MRI BRAIN WITHOUT AND WITH CONTRAST  8/13/2017 5:18 PM    HISTORY:  New expressive aphasia, probable CVA/TIA.     TECHNIQUE:  Multiplanar, multisequence MRI of the brain without and  with 10 mL Gadavist.    COMPARISON: Head CTA from earlier the same day.    FINDINGS: Small 0.4 cm focus of restricted  diffusion in the left  occipital lobe cortex (series 602 image 25) concerning for infarct.  Minimal associated T2 hyperintensity in this region. No evidence of  hemorrhagic transformation. No significant mass effect or midline  shift.    No evidence of acute intracranial hemorrhage. Moderate diffuse  parenchymal volume loss. Confluent periventricular as well as patchy  deep and subcortical white matter T2 hyperintensities are likely  related to chronic microvascular ischemic disease in a patient of this  age. No hydrocephalus. Incidental note of cavum septum pellucidum. No  abnormal intracranial enhancement.    The facial structures appear normal. The arteries at the base of the  brain and the dural venous sinuses appear patent.       Impression    IMPRESSION:  1. Small cortical infarct within the left occipital lobe without  hemorrhagic conversion. No significant mass effect or midline shift.  2. There is generalized atrophy of the brain. White matter changes are  present in the cerebral hemispheres that are consistent with small  vessel ischemic disease in this age patient.         SHERYL GREGORIO MD

## 2017-08-14 NOTE — PLAN OF CARE
Problem: Goal Outcome Summary  Goal: Goal Outcome Summary  Occupational Therapy Discharge Summary     Reason for therapy discharge:    Discharged to home with outpatient therapy.     Progress towards therapy goal(s). See goals on Care Plan in [facility] electronic health record for goal details.  Goals not met.  Barriers to achieving goals:   discharge on same date as initial evaluation.     Therapy recommendation(s):    Continued therapy is recommended.  Rationale/Recommendations:  OP OT for cognitive training/retraining due to current deficits. .

## 2017-08-14 NOTE — DISCHARGE INSTRUCTIONS
Your risk factors for stroke or TIA (transient ischemic attack):    Your Risk Factors Your Results Normal Ranges   High blood pressure BP Readings from Last 1 Encounters:   08/14/17 105/60    Less than 120/80   Cholesterol              Total Lab Results   Component Value Date    CHOL 87 08/13/2017      Less than 150    Triglycerides   Lab Results   Component Value Date    TRIG 33 08/13/2017    Less than 150   LDL Lab Results   Component Value Date    LDL 35 08/13/2017       Less than 70   HDL Lab Results   Component Value Date    HDL 45 08/13/2017            Greater than 40 (men)  Greater than 50 (women)   Diabetes   Recent Labs  Lab 08/14/17  0812   GLC 98    Fasting blood glucose    Smoking/tobacco use  Quit smoking and tobacco   Overweight  Lose 1-2 pounds a week   Lack of exercise  30 minutes moderate activity each day   Other risk factors include carotid (neck) artery disease, atrial fibrillation and stress. You may be on new medicine to treat high blood pressure, cholesterol, diabetes or atrial fibrillation.    Understanding Stroke Booklet given to patient. Please refer to booklet for further information.    Stroke warning signs and symptoms - CALL 911 right away for:  - Sudden numbness or weakness in the face, arm or leg (often on one side of the body).  - Sudden confusion or trouble understanding what is going on.  - Sudden blurred or decreased vision in one or both eyes.  - Sudden trouble speaking, loss of balance, dizziness or problems with coordination.  - Sudden, severe headache for no reason.  - Fainting or seizures.  - Symptoms may go away then come back suddenly.

## 2017-08-14 NOTE — PLAN OF CARE
Problem: Goal Outcome Summary  Goal: Goal Outcome Summary  OT/CR: Smoking cessation order received. Per chart review, pt with denies/no history of tobacco use. Smoking cessation order completed.

## 2017-08-14 NOTE — DISCHARGE SUMMARY
DATE OF ADMISSION: 08/13/2017   DATTE OF DISCHARGE:   08/14/2017      PRIMARY CARE PHYSICIAN:  [facility].  The patient is also going to be seen at [facility] for new primary care physician closer to home.  [facility], Dr. Robison; Dr. Yates      DISCHARGE DIAGNOSES:   1.  Occipital stroke which was tiny and without hemorrhagic conversion.     2.  Expressive aphasia, resolved.   3.  Hyponatremia, stable; this is a chronic condition.   4.  History of supraventricular tachycardia followed at [facility].   5.  History of abdominal aortic aneurysm followed by serial ultrasounds [facility].  His last ultrasound in May 2017 showed abdominal aortic aneurysm 3.7x4.5 with no significant change compared with 08/28/2014.   6.  History of mild left ventricular diastolic dysfunction noted on cardiac echo 05/17/2017.   7.  History of anxiety.   8.  History of osteoporosis age-related.   9.  History of synovitis and tenosynovitis.   10.  History of asthma in the past; not currently problematic.      DISCHARGE MEDICATIONS:   The same as on admission, except his aspirin has been increased from 81 mg daily to 325 mg daily.  Therefore:-   1.  Aspirin 325 mg daily.   2.  Ativan 0.25 mg p.o. daily.   3   Benadryl 25 mg daily p.r.n.   4.  Betamethasone dipropionate 0.05% b.i.d. topically.   5.  Centrum Silver 1 tablet daily.   6.  Cholecalciferol 400 units p.o. daily.   7.  Glucosamine/chondroitin 1 tablet daily.   8.  Hydrocortisone 2.5% cream topically b.i.d.   9.  Lipitor 10 mg daily.   10.  Loratadine 10 mg daily.   11.  Metoprolol succinate 12.5 mg p.o. b.i.d.     12.  MetroGel 1% apply topically daily.   13.  Systane Ultra ophthalmic solution 1 drop both eyes b.i.d.   14.  Saline nasal spray 1 spray in nostril daily.      PENDING TEST RESULTS:  Vitamin D is pending at the time of discharge.      PERTINENT LABS DURING THIS HOSPITALIZATION:  His sodium on admission was 126.  Followup sodium later that  same day 128, sodium on the day of discharge 128.  His other electrolytes on the day of discharge, potassium 4.1, chloride 99, bicarbonate 24, BUN 12, creatinine 0.65, calcium 8.0.  White count 5.6, hemoglobin 11.6, platelet count 148,000.  His A1c 5.5, cholesterol 87.  CRP less than 2.9, HDL 45, LDL 35.Non-HDL cholesterol 42, triglycerides 33, troponin less than 0.015.  TSH 1.81.      IMAGING STUDIES:  CT of the head without contrast 08/13/2017 showed atrophy and chronic white matter disease, nothing acute left M1 calcification.  CTA angiogram of the head 08/13/2017 showed a normal [place], including variants, chronically occluded right vertebral artery, distal right vertebral artery could be constitutes and ends in the PICA; mild to moderate stenosis of the origin of both internal carotid arteries.  CT brain perfusion 08/13/2017 showed normal CT perfusion of the brain.  MRI of the brain on 08/13/2017 showed one small cortical infarct within the left occipital lobe without hemorrhagic conversion, no significant mass effect or midline shift, generalized atrophy of the brain, white matter changes present in the cerebral hemispheres consistent with small vessel ischemic disease in this age patient.      CONSULTS DURING THIS HOSPITALIZATION:  Neurology.      BRIEF HISTORY/HOSPITAL COURSE:  Please see dictated history and physical for patient's initial presentation.       Mr. Corwin Puentes is a delightful 89-year-old male with a history of hypertension, hyperlipidemia, SVT which is followed at [facility] in the Cardiology and known abdominal aortic aneurysm also followed at [facility].  The patient has been in good health, but awoke in the morning with some expressive aphasia.  His speech was not garbled but he was having word finding difficulties and difficult finishing his sentences, which is very unusual for him.  Due to this, the patient was admitted to [facility].  Initial workup in  the ER with the CTA perfusion of the brain and CT of the head CTA of the head was unrevealing.  The patient was admitted to the Neurology Service where an MRI showed a small cortical infarct in the left occipital lobe without hemorrhagic conversion.  The patient also had echocardiogram done 2017.  This showed cardiac source of embolus was not identified.  Contrast injection bubble study performed was negative for flow across intra- atrial septum.  LV systolic function was normal with EF was estimated at 60-65%.  The left ventricle is normal in size, mild 1+ MR, RV systolic pressure was elevated consistent with mild pulmonary hypertension.  The rhythm was normal sinus. Neurology recommended increasing his aspirin from 81 mg daily to 325 mg daily.  This is really the only change that was made.      CODE STATUS:  Full code.      NOTE:  Time for this discharge due to having multiple visits with the patient was approximately 40 minutes.         MANI CARRASCO MD             D: 2017 14:16   T: 2017 15:24   MT: [id number]      Name:     SANTA DAS   MRN:      [id number]        Account:        [id number]   :      [id number]           Admit Date:     [id number]                                  Discharge Date: 2017      Document: [id number]       cc: Mohit Robison MD, PhD, YISSEL Yates MD

## 2017-08-14 NOTE — PLAN OF CARE
Problem: Goal Outcome Summary  Goal: Goal Outcome Summary  Outcome: Improving  VSS, A/O x 4 and very pleasant. Neuro intact x some forgetfulness this a.m.. He did have urgency to void and set off his alarm, also dislodging the cap off his IV and spilling a lot of blood which concerned him. A urinal was provided and use of the call light/bed lights reinforced after he claimed he lost his call light. He was unable to demonstrate how to call for help when he had understood this earlier in the shift x 2. He was A/O x 4, however. On tele, SB. VSS, SpO2 WDL. Slight limited ROM L ankle/R knee from fusion/replacement. Bed alarm on. Slept well. Na 128. Reports baseline hyponatremia

## 2017-08-14 NOTE — PROGRESS NOTES
08/14/17 0933   Quick Adds   Type of Visit Initial Occupational Therapy Evaluation   Living Environment   Lives With spouse   Living Arrangements independent living facility   Home Accessibility no concerns   Number of Stairs to Enter Home 0   Number of Stairs Within Home 0   Transportation Available car;family or friend will provide   Living Environment Comment [place] in [place], no stairs, walk in shower (owns shower chair, does not use). Also have 2 story home in [place] and single level home in [place].    Self-Care   Dominant Hand right   Usual Activity Tolerance good   Current Activity Tolerance moderate   Regular Exercise yes   Activity/Exercise Type walking;strength training   Exercise Amount/Frequency daily   Equipment Currently Used at Home none   Functional Level Prior   Ambulation 0-->independent   Transferring 0-->independent   Toileting 0-->independent   Bathing 0-->independent   Dressing 0-->independent   Eating 0-->independent   Communication 0-->understands/communicates without difficulty   Swallowing 0-->swallows foods/liquids without difficulty   Cognition 0 - no cognition issues reported   Fall history within last six months no   Which of the above functional risks had a recent onset or change? communication/speech;cognition   Prior Functional Level Comment Pt reports indep in all ADLs, IADLs and mobility tasks with no AD at baseline. Spouse completes cooking.    General Information   Onset of Illness/Injury or Date of Surgery - Date 08/13/17   Referring Physician Natalie Pollard MD   Patient/Family Goals Statement Pt's goal is to d/c home   Additional Occupational Profile Info/Pertinent History of Current Problem Per chart: Pt is a 89 year old male who presented with expressive aphasia, is admitted as inpatient with suspected left MCA transient ischemic attack. Symptoms have resolved and Initial evaluation was un-revealing for acute ischemic stroke on brain imaging.   "  Precautions/Limitations fall precautions   Cognitive Status Examination   Orientation orientation to person, place and time   Level of Consciousness alert   Able to Follow Commands WNL/WFL   Personal Safety (Cognitive) mild impairment   Memory impaired   Cognitive Comment Pt would benefit from further cognitive assessment. Mild word finding difficulties at times.    Visual Perception   Visual Perception Wears glasses  (wears mainly for \"dry eye\" symptoms)   Sensory Examination   Sensory Comments Pt denies numbness/tingling in BUEs   Pain Assessment   Patient Currently in Pain No   Range of Motion (ROM)   ROM Comment WFL   Strength   Strength Comments WFL   Hand Strength   Hand Strength Comments WFL   Coordination   Upper Extremity Coordination No deficits were identified   Bed Mobility Skill: Sit to Supine   Level of [place]: Sit/Supine stand-by assist   Physical Assist/Nonphysical Assist: Sit/Supine 1 person assist   Bed Mobility Skill: Supine to Sit   Level of [place]: Supine/Sit stand-by assist   Physical Assist/Nonphysical Assist: Supine/Sit 1 person assist   Transfer Skill: Bed to Chair/Chair to Bed   Level of [place]: Bed to Chair stand-by assist   Physical Assist/Nonphysical Assist: Bed to Chair 1 person assist   Transfer Skill: Sit to Stand   Level of [place]: Sit/Stand stand-by assist   Physical Assist/Nonphysical Assist: Sit/Stand 1 person assist   Transfer Skill: Toilet Transfer   Level of [place]: Toilet stand-by assist   Physical Assist/Nonphysical Assist: Toilet 1 person assist   Balance   Balance Comments SBA while in stance   Upper Body Dressing   Level of [place]: Dress Upper Body stand-by assist   Physical Assist/Nonphysical Assist: Dress Upper Body 1 person assist   Lower Body Dressing   Level of [place]: Dress Lower Body stand-by assist   Physical Assist/Nonphysical Assist: Dress Lower Body 1 person assist   Toileting   Level of [place]: Toilet stand-by " "assist   Physical Assist/Nonphysical Assist: Toilet 1 person assist   Grooming   Level of [place]: Grooming stand-by assist   Physical Assist/Nonphysical Assist: Grooming 1 person assist   Instrumental Activities of Daily Living (IADL)   Previous Responsibilities housekeeping;medication management;finances;driving   IADL Comments Pt and spouse share household tasks, spouse completes all cooking   Activities of Daily Living Analysis   Impairments Contributing to Impaired Activities of Daily Living cognition impaired   General Therapy Interventions   Planned Therapy Interventions cognition;ADL retraining;IADL retraining   Clinical Impression   Criteria for Skilled Therapeutic Interventions Met yes, treatment indicated   OT Diagnosis Impaired IADLs, cognition   Influenced by the following impairments Impaired safety/cognition   Assessment of Occupational Performance 1-3 Performance Deficits   Identified Performance Deficits Financial management, med mgmt, driving   Clinical Decision Making (Complexity) Low complexity   Therapy Frequency 3 times/wk   Predicted Duration of Therapy Intervention (days/wks) 4 days   Anticipated Discharge Disposition Home with Outpatient Therapy   Risks and Benefits of Treatment have been explained. Yes   Patient, Family & other staff in agreement with plan of care Yes   [facility] AM-PAC  \"6 Clicks\" Daily Activity Inpatient Short Form   1. Putting on and taking off regular lower body clothing? 3 - A Little   2. Bathing (including washing, rinsing, drying)? 3 - A Little   3. Toileting, which includes using toilet, bedpan or urinal? 3 - A Little   4. Putting on and taking off regular upper body clothing? 3 - A Little   5. Taking care of personal grooming such as brushing teeth? 4 - None   6. Eating meals? 4 - None   Daily Activity Raw Score (Score out of 24.Lower scores equate to lower levels of function) 20   Total Evaluation Time   Total Evaluation Time (Minutes) 10     "

## 2017-08-21 ENCOUNTER — OFFICE VISIT (OUTPATIENT)
Dept: FAMILY MEDICINE | Facility: CLINIC | Age: 82
End: 2017-08-21
Payer: MEDICARE

## 2017-08-21 VITALS
DIASTOLIC BLOOD PRESSURE: 60 MMHG | SYSTOLIC BLOOD PRESSURE: 138 MMHG | BODY MASS INDEX: 20.38 KG/M2 | HEART RATE: 60 BPM | WEIGHT: 138 LBS | TEMPERATURE: 97.1 F

## 2017-08-21 DIAGNOSIS — I63.9 OCCIPITAL STROKE (H): Primary | ICD-10-CM

## 2017-08-21 PROBLEM — Z96.651 S/P TOTAL KNEE REPLACEMENT, RIGHT: Status: ACTIVE | Noted: 2017-08-21

## 2017-08-21 PROBLEM — E87.1 HYPONATREMIA: Status: ACTIVE | Noted: 2017-08-21

## 2017-08-21 PROBLEM — I49.1 PAC (PREMATURE ATRIAL CONTRACTION): Status: ACTIVE | Noted: 2017-08-21

## 2017-08-21 PROBLEM — I69.320 APHASIA DUE TO RECENT CEREBROVASCULAR ACCIDENT: Status: RESOLVED | Noted: 2017-08-13 | Resolved: 2017-08-21

## 2017-08-21 PROBLEM — I71.40 ABDOMINAL AORTIC ANEURYSM (H): Status: ACTIVE | Noted: 2017-08-21

## 2017-08-21 PROCEDURE — 99214 OFFICE O/P EST MOD 30 MIN: CPT | Performed by: INTERNAL MEDICINE

## 2017-08-21 NOTE — NURSING NOTE
"Chief Complaint   Patient presents with     Hospital F/U       Initial /60 (BP Location: Left arm, Patient Position: Chair, Cuff Size: Adult Regular)  Pulse 60  Temp 97.1  F (36.2  C) (Tympanic)  Wt 138 lb (62.6 kg)  BMI 20.38 kg/m2 Estimated body mass index is 20.38 kg/(m^2) as calculated from the following:    Height as of 8/13/17: 5' 9\" (1.753 m).    Weight as of this encounter: 138 lb (62.6 kg).  Medication Reconciliation: complete  "

## 2017-08-21 NOTE — MR AVS SNAPSHOT
"              After Visit Summary   2017    Corwin Puentes    MRN: [id number]           Patient Information     Date Of Birth          1927        Visit Information        Provider Department      2017 11:40 AM Malathi Johnson MD [facility] [place]         Follow-ups after your visit        Follow-up notes from your care team     Return in about 2 months (around 10/21/2017).      Who to contact     If you have questions or need follow up information about today's clinic visit or your schedule please contact [facility] [place] directly at [phone].  Normal or non-critical lab and imaging results will be communicated to you by MyChart, letter or phone within 4 business days after the clinic has received the results. If you do not hear from us within 7 days, please contact the clinic through [email or web address] or phone. If you have a critical or abnormal lab result, we will notify you by phone as soon as possible.  Submit refill requests through [employer] or call your pharmacy and they will forward the refill request to us. Please allow 3 business days for your refill to be completed.          Additional Information About Your Visit        MyChart Information     [employer] lets you send messages to your doctor, view your test results, renew your prescriptions, schedule appointments and more. To sign up, go to [email or web address] . Click on \"Log in\" on the left side of the screen, which will take you to the Welcome page. Then click on \"Sign up Now\" on the right side of the page.     You will be asked to enter the access code listed below, as well as some personal information. Please follow the directions to create your username and password.     Your access code is: [email or web address]  Expires: 2017 11:54 AM     Your access code will  in 90 days. If you need help or a new code, please call your [facility] or [phone].        Care EveryWhere ID     This is your Care " EveryWhere ID. This could be used by other organizations to access your [place] medical records  [id number]        Your Vitals Were     Pulse Temperature BMI (Body Mass Index)             60 97.1  F (36.2  C) (Tympanic) 20.38 kg/m2          Blood Pressure from Last 3 Encounters:   08/21/17 138/60   08/14/17 105/60    Weight from Last 3 Encounters:   08/21/17 138 lb (62.6 kg)   08/14/17 139 lb 5.3 oz (63.2 kg)              Today, you had the following     No orders found for display       Primary Care Provider Office Phone # Fax #    Malathi Johnson [phone]       [address]  [place] [address]        Equal Access to Services     MELINDA STEEL : Hadii mel martin hadasho Soomaali, waaxda luqadaha, qaybta kaalmada adeegyada, waxjazmin cramer hayrolf henry . So [facility] [phone].    ATENCIÓN: Si habla español, tiene a kathleen disposición servicios gratuitos de asistencia lingüística. Llame al [phone].    We comply with applicable federal civil rights laws and [place] laws. We do not discriminate on the basis of race, color, national origin, age, disability sex, sexual orientation or gender identity.            Thank you!     Thank you for choosing [facility] [place]  for your care. Our goal is always to provide you with excellent care. Hearing back from our patients is one way we can continue to improve our services. Please take a few minutes to complete the written survey that you may receive in the mail after your visit with us. Thank you!             Your Updated Medication List - Protect others around you: Learn how to safely use, store and throw away your medicines at www.disposemymeds.org.          This list is accurate as of: 8/21/17 12:18 PM.  Always use your most recent med list.                   Brand Name Dispense Instructions for use Diagnosis    aspirin 325 MG EC tablet     40 tablet    Take 1 tablet (325 mg) by mouth daily    Cerebrovascular  accident (CVA) due to stenosis of cerebral artery (H)       ATIVAN PO      Take 0.25 mg by mouth daily        BENADRYL PO      Take 25 mg by mouth daily as needed        betamethasone dipropionate 0.05 % cream    DIPROSONE     Apply topically 2 times daily        CENTRUM SILVER per tablet      Take 1 tablet by mouth daily        cholecalciferol 400 UNITS tablet    Vitamin D     Take 400 Units by mouth daily        glucosamine-chondroitinoitin 750-600 MG Tabs      Take 1 tablet by mouth daily        hydrocortisone 2.5 % cream      Apply topically 2 times daily        LIPITOR PO      Take 10 mg by mouth daily        loratadine 10 MG tablet    CLARITIN     Take 10 mg by mouth daily        metroNIDAZOLE 1 % gel    METROGEL     Apply topically daily        polyethylene glycol 0.4%- propylene glycol 0.3% 0.4-0.3 % Soln ophthalmic solution    SYSTANE ULTRA     Place 1 drop into both eyes 2 times daily        saline 0.9 % Aers      Spray 1 spray in nostril daily        TOPROL XL PO      Take 25 mg by mouth

## 2017-08-21 NOTE — PROGRESS NOTES
SUBJECTIVE:   Corwin Puentes is a 89 year old male who presents to clinic today for the following health issues:          Hospital Follow-up Visit:    Hospital/Nursing Home/IP Rehab Facility: [facility]  Date of Admission: 8/13  Date of Discharge: 8/14  Reason(s) for Admission: Occipital stroke             Problems taking medications regularly:  None       Medication changes since discharge: None, but increased his Aspirin from 81 mg to 325 mg        Problems adhering to non-medication therapy:  na    Summary of hospitalization:  [facility] discharge summary reviewed  Diagnostic Tests/Treatments reviewed.  Follow up needed: none  Other Healthcare Providers Involved in Patient s Care:         Neurology   Update since discharge: improved.       Post Discharge Medication Reconciliation: discharge medications reconciled, continue medications without change.  Plan of care communicated with patient and his wife     Coding guidelines for this visit:  Type of Medical   Decision Making Face-to-Face Visit       within 7 Days of discharge Face-to-Face Visit        within 14 days of discharge   Moderate Complexity [phone]   High Complexity [phone]            Corwin was admitted to [facility] with word-finding difficulty and found to have a small occipital stroke on MRI, CT angiogram was normal. His symptoms resolved pretty quickly. He had an echo which showed normal LVEF, mild MR, increased RV systolic pressure, no cardiac source of embolus.  He was evaluated by Neurology and they recommended increasing ASA from 81mg to 325mg.  He is supposed to be contacted by Dr. Robison's office for a follow up.  He was evaluated by OT and speech and outpatient referrals were ordered.       Corwin is doing well since discharge. He reports his speech and thinking are normal. Denies headaches, vision changes, focal numbness tingling or weakness, chest pain, SOB, abdominal pain, falls. His energy and  appetite are good.  He and his wife moved into a senior apartment complex this past spring. They have two children in the area and one in [place]. They spend aguirre in [place], usually October to May. He does report drinking one shot daily.         Reviewed and updated as needed this visit by clinical staff  Tobacco  Allergies  Meds  Problems  Soc Hx      Reviewed and updated as needed this visit by Provider  Meds  Problems         ROS:  Const, neuro, cv, pulm, GI reviewed, otherwise negative unless noted above.     OBJECTIVE:     /60 (BP Location: Left arm, Patient Position: Chair, Cuff Size: Adult Regular)  Pulse 60  Temp 97.1  F (36.2  C) (Tympanic)  Wt 138 lb (62.6 kg)  BMI 20.38 kg/m2  Body mass index is 20.38 kg/(m^2).    Gen: well appearing, pleasant elderly gentleman, no distress  HEENT: PERRL, sclera nonicteric, MMM  Neck: supple, no LAD  Pulm: breathing comfortably, CTAB, no wheezes or rales  CV: RRR, frequent premature beats, normal S1 and S2, no murmurs  Abd: BS present, soft, nontender, nondistended  Ext: 2+ distal pulses, no LE edema         ASSESSMENT/PLAN:       1. Occipital stroke (H)  Corwin is back to baseline.  He had outpatient OT and SLP ordered at discharge.  Will follow up with Neuro in 1 month or so.  He is on 325mg aspirin. Atorvastatin 10mg daily - his LDL is 35.      Will plan to see him in follow up in a month or two before they head down to [place]. Will repeat CBC, BMP at that time.  Na was 128 in the hospital, last year was 130.          Malathi Johnson MD  [facility]

## 2017-09-27 ENCOUNTER — HOSPITAL ENCOUNTER (EMERGENCY)
Facility: CLINIC | Age: 82
Discharge: HOME | End: 2017-09-27
Attending: EMERGENCY MEDICINE | Admitting: EMERGENCY MEDICINE
Payer: MEDICARE

## 2017-09-27 ENCOUNTER — APPOINTMENT (OUTPATIENT)
Dept: GENERAL RADIOLOGY | Facility: CLINIC | Age: 82
End: 2017-09-27
Attending: EMERGENCY MEDICINE
Payer: MEDICARE

## 2017-09-27 VITALS
HEIGHT: 68 IN | TEMPERATURE: 97.9 F | SYSTOLIC BLOOD PRESSURE: 136 MMHG | DIASTOLIC BLOOD PRESSURE: 80 MMHG | RESPIRATION RATE: 18 BRPM | WEIGHT: 137 LBS | BODY MASS INDEX: 20.76 KG/M2 | OXYGEN SATURATION: 97 % | HEART RATE: 71 BPM

## 2017-09-27 DIAGNOSIS — S82.891A ANKLE FRACTURE, RIGHT, CLOSED, INITIAL ENCOUNTER: ICD-10-CM

## 2017-09-27 PROCEDURE — A9270 NON-COVERED ITEM OR SERVICE: HCPCS | Mod: GY | Performed by: EMERGENCY MEDICINE

## 2017-09-27 PROCEDURE — 99284 EMERGENCY DEPT VISIT MOD MDM: CPT | Mod: 25

## 2017-09-27 PROCEDURE — 27760 CLTX MEDIAL ANKLE FX: CPT | Mod: RT

## 2017-09-27 PROCEDURE — 25000132 ZZH RX MED GY IP 250 OP 250 PS 637: Mod: GY | Performed by: EMERGENCY MEDICINE

## 2017-09-27 PROCEDURE — 73610 X-RAY EXAM OF ANKLE: CPT | Mod: RT

## 2017-09-27 RX ORDER — HYDROCODONE BITARTRATE AND ACETAMINOPHEN 5; 325 MG/1; MG/1
1 TABLET ORAL ONCE
Status: COMPLETED | OUTPATIENT
Start: 2017-09-27 | End: 2017-09-27

## 2017-09-27 RX ORDER — HYDROCODONE BITARTRATE AND ACETAMINOPHEN 5; 325 MG/1; MG/1
1 TABLET ORAL EVERY 8 HOURS PRN
Qty: 15 TABLET | Refills: 0 | Status: SHIPPED | OUTPATIENT
Start: 2017-09-27 | End: 2018-07-23

## 2017-09-27 RX ADMIN — HYDROCODONE BITARTRATE AND ACETAMINOPHEN 1 TABLET: 5; 325 TABLET ORAL at 02:13

## 2017-09-27 ASSESSMENT — ENCOUNTER SYMPTOMS
JOINT SWELLING: 1
ARTHRALGIAS: 1

## 2017-09-27 NOTE — ED PROVIDER NOTES
"  History     Chief Complaint:  Ankle Pain      HPI   Corwin Puentes is a 89 year old male who presents to the emergency department today via EMS for evaluation of ankle pain. The patient states that [facility] when he stopped at [employer] and was walking and tripped on the cement. He states he tried to keep his balance but he eventually fell, landing mainly on his butt and his elbows which he has very minor injuries too. He states that he was able to walk on it afterward but it became gradually worse. He states that once it got really bad he became sweaty and woozy from the pain He believes his ankle is swollen but states that he has normal feeling in his foot and toes.       Allergies:  Alendronic acid  Mold  Peanut-derived  Pollen extract  risedronic acid      Medications:    Metoprolol Succinate  Aspirin   Betamethasone  dipropionate  Loratadine   Atorvastatin Calcium  Metronidazole   Glucosamine-chondroitinoitin  Diphenhydramine HCl  Polyethylene glycol ophthalmic solution  Hydrocortisone  cream  Lorazepam    Past Medical History:    Hypertension    Past Surgical History:    Left ankle fusion  Right knee replacement    Family History:    History reviewed. No pertinent family history.    Social History:  Marital Status:   [2]  Social History   Substance Use Topics     Smoking status: Never Smoker     Smokeless tobacco: Never Used     Alcohol use Yes      Comment: one shot a day         Review of Systems   Musculoskeletal: Positive for arthralgias and joint swelling.   10 point review of systems performed and is negative except as above and in HPI.     Physical Exam   First Vitals:  BP: 157/83  Pulse: 71  Temp: 97.9  F (36.6  C)  Resp: 18  Height: 172.7 cm (5' 8\")  Weight: 62.1 kg (137 lb)  SpO2: 98 %    Physical Exam  General: Resting on the gurney, appears comfortable  Head:  The scalp, face, and head appear normal. Head no contusions or abrasion or evidence of injury to the " head. Neck no midline tenderness to palpation.   Mouth/Throat: Mucus membranes are moist  CV:  Regular rate    Normal S1 and S2  No pathological murmur   Resp:  Breath sounds clear and equal bilaterally    Non-labored, no retractions or accessory muscle use    No coarseness    No wheezing   GI:  Abdomen is soft, no rigidity    No tenderness to palpation  MS:  Normal motor assessment of all extremities.    Right ankle with tenderness to palpation over the lateral malleolus. Mild tenders over the medial malleolus. Slight swelling of the ankle pain with rang of motion of the ankle. Easily palpable dorsal pedal pulse. Normal sensation in the toes brisk capillary refill. No tenderness to the palpation of the shin or knee. No laceration or abrasions      Skin:   No rash or lesions noted.  Neuro:  Speech is normal and fluent. No apparent deficit.  Psych: Awake. Alert.  Normal affect.      Appropriate interactions.     Emergency Department Course     Imaging:  Radiology findings were communicated with the patient who voiced understanding of the findings.  Ankle XR, G/E 3 views, right  Preliminary Result  IMPRESSION: Small bone fragment at the tip of the lateral malleolus  may be an avulsion fracture. No other fracture or dislocation. Mild  lateral soft tissue swelling. Multiple vascular calcifications.    Reading is preliminary at time of note      Interventions:  0213 Norco 325 mg PO    Emergency Department Course:  Nursing notes and vitals reviewed.  I performed an exam of the patient as documented above.   Orders Placed This Encounter   Procedures     Ankle XR, G/E 3 views, right   I discussed the treatment plan with the patient. They expressed understanding of this plan and consented to discharge. They will be discharged home with instructions for care and follow up. In addition, the patient will return to the emergency department if their symptoms persist, worsen, if new symptoms arise or if there is any concern.  All  questions were answered.     I personally reviewed the imaging results with the patient and answered all related questions prior to discharge.  Impression & Plan      Medical Decision Making:  Corwin Puentes is a 89 year old male who presents for evaluation of ankle pain after a fall. CMS is intact distally in the extremity.  X-rays reveal a fracture that does not need reduction at this time. The patient understands that the need for reduction and/or surgery may change with time and orthopedic consultation. There is no indication for ortho consultation from the ED. Rather, close follow-up is indicated in the next days.  Splint and fracture precautions for home.  The patients head to toe trauma exam is otherwise normal at this time and no further trauma workup is needed as I believe there is no signs of serious head, neck, chest, spinal, extremity or abdominal injuries.       Diagnosis:    ICD-10-CM    1. Ankle fracture, right, closed, initial encounter S82.891A      Disposition:   Discharged     Discharge Medications:  New Prescriptions    HYDROCODONE-ACETAMINOPHEN (NORCO) 5-325 MG PER TABLET    Take 1 tablet by mouth every 8 hours as needed for moderate to severe pain       Scribe Disclosure:  Mohit WASHINGTON, am serving as a scribe at 1:54 AM on 9/27/2017 to document services personally performed by Jillian Armas MD, based on my observations and the provider's statements to me.      EMERGENCY DEPARTMENT       Jillian Armas MD  10/04/17 6183

## 2017-09-27 NOTE — DISCHARGE INSTRUCTIONS
Discharge Instructions  Extremity Injury    You were seen today for an injury to an extremity (arm, hand, leg, or foot). You may have a bruise, strain, or fracture (broken bone).    Return to the Emergency Department or see your regular doctor if your injured area is not back to normal within 5-7 days.    Return to the Emergency Department right away if:    Your pain seems to change or get worse or there is pain in a new area.    Your extremity becomes pale, cool, blue, or numb or tingling past the injury.    You have more drainage, redness or pain in the area of the cut or abrasion.    You have pain that you can t control with the medicine recommended or prescribed here, or you have pain that seems too much for your injury.    Your child will not stop crying or is much more fussy than normal.    You have new symptoms or anything that worries you.    What to Expect:    Your swelling and pain may be worse the day after your injury, but should not be severe and should start getting better after that. You should not have new symptoms and your pain should not get worse.    You may start to get a bruise over the injured area or below the injured area.    Your movement and strength should get better with time.    Some injuries may not show up until after you have left the Emergency Department so it is important to follow-up with your regular doctor.    Your injury may prevent you from working.  Follow-up with your regular doctor to get a work release note.    Pain medications or your injury may make it unsafe to drive or operate machinery.    Home Care:    Apply ice your injured area for 15 minutes at a time, at least 3 times a day. Use a cloth between the ice bag and your skin to prevent frostbite.     Do not sleep with an ice pack or heating pad on, since this can cause burns or skin injury.    Rest your injured area for at least 1-2 days. After that you may start using your extremity again as long as there is not too  much pain.     Raise the injured area above the level of your heart as much as possible in the first 1-2 days.    Use Tylenol  (acetaminophen), Motrin (ibuprofen), or Advil  (ibuprofen) for your pain unless you have an allergy or are told not to use these medications by your doctor.  Take the medications as instructed on the package. Tylenol  (acetaminophen) is in many prescription medicines and non-prescription medicines--check all of your medicines to be sure you aren t taking more than 3000 mg per day.    You may use an elastic bandage (Ace  Wrap) if it makes you more comfortable. Wrap it just tight enough to provide light compression, like a new pair of socks feels. Loosen the bandage if you have swelling past the bandage.      Please follow any other instructions that were discussed with you by your doctor.    MORE INFORMATION:    X-rays:  X-rays done today were read by your doctor but will also be read by a radiologist.  We will contact you if the radiologist sees anything different on the x-ray.  Your regular doctor may also want to review your x-rays on follow-up.    You could have a fracture (break), even if we told you your x-rays were normal. X-rays are not always certain, and some fractures are hard to see and may not show up right away.  Also, your x-ray may look like you have a fracture, even though you do not.  It is important to follow-up with your regular doctor.     Stretching:  If your injury was to your arm or shoulder and your doctor put you in a sling or an immobilizer, it is important that you take off your immobilizer within 3 days and stretch/move your shoulder, unless your doctor specifically tells you to not move your shoulder.  This is to prevent further injury such as a  frozen shoulder .     If you were given a prescription for medicine here today, be sure to read all of the information (including the package insert) that comes with your prescription.  This will include important  information about the medicine, its side effects, and any warnings that you need to know about.  The pharmacist who fills the prescription can provide more information and answer questions you may have about the medicine.  If you have questions or concerns that the pharmacist cannot address, please call or return to the Emergency Department.     Opioid Medication Information    Pain medications are among the most commonly prescribed medicines, so we are including this information for all our patients. If you did not receive pain medication or get a prescription for pain medicine, you can ignore it.     You may have been given a prescription for an opioid (narcotic) pain medicine and/or have received a pain medicine while here in the Emergency Department. These medicines can make you drowsy or impaired. You must not drive, operate dangerous equipment, or engage in any other dangerous activities while taking these medications. If you drive while taking these medications, you could be arrested for DUI, or driving under the influence. Do not drink any alcohol while you are taking these medications.     Opioid pain medications can cause addiction. If you have a history of chemical dependency of any type, you are at a higher risk of becoming addicted to pain medications.  Only take these prescribed medications to treat your pain when all other options have been tried. Take it for as short a time and as few doses as possible. Store your pain pills in a secure place, as they are frequently stolen and provide a dangerous opportunity for children or visitors in your house to start abusing these powerful medications. We will not replace any lost or stolen medicine.  As soon as your pain is better, you should flush all your remaining medication.     Many prescription pain medications contain Tylenol  (acetaminophen), including Vicodin , Tylenol #3 , Norco , Lortab , and Percocet .  You should not take any extra pills of  Tylenol  if you are using these prescription medications or you can get very sick.  Do not ever take more than 3000 mg of acetaminophen in any 24 hour period.    All opioids tend to cause constipation. Drink plenty of water and eat foods that have a lot of fiber, such as fruits, vegetables, prune juice, apple juice and high fiber cereal.  Take a laxative if you don t move your bowels at least every other day. Miralax , Milk of Magnesia, Colace , or Senna  can be used to keep you regular.      Remember that you can always come back to the Emergency Department if you are not able to see your regular doctor in the amount of time listed above, if you get any new symptoms, or if there is anything that worries you.    You are at higher risk for blood clot with your foot in a boot.  Make sure that you take your daily Aspirin and keep moving and talk to your doctor tomorrow about how to minimize your risk of blood clots

## 2017-09-27 NOTE — ED NOTES
Bed: ED21  Expected date:   Expected time:   Means of arrival:   Comments:  [place] 738 89M ankle inj

## 2017-09-27 NOTE — ED AVS SNAPSHOT
Emergency Department    [address] [place] [address]    Phone:  [phone]    Fax:  [phone]                                       Corwin Puentes   MRN: [id number]    Department:   Emergency Department   Date of Visit:  9/27/2017           After Visit Summary Signature Page     I have received my discharge instructions, and my questions have been answered. I have discussed any challenges I see with this plan with the nurse or doctor.    ..........................................................................................................................................  Patient/Patient Representative Signature      ..........................................................................................................................................  Patient Representative Print Name and Relationship to Patient    ..................................................               ................................................  Date                                            Time    ..........................................................................................................................................  Reviewed by Signature/Title    ...................................................              ..............................................  Date                                                            Time

## 2017-09-27 NOTE — ED AVS SNAPSHOT
Emergency Department    [address] [facility] [address]    Phone:  [phone]    Fax:  [phone]                                       Corwin Puentes   MRN: [id number]    Department:   Emergency Department   Date of Visit:  9/27/2017           Patient Information     Date Of Birth          5/27/1927        Your diagnoses for this visit were:     Ankle fracture, right, closed, initial encounter        You were seen by Jillian Armas MD.      Follow-up Information     Follow up with Malathi Johnson MD. Schedule an appointment as soon as possible for a visit in 2 days.    Specialty:  Internal Medicine    Contact information:    [address] [place] [address] [address]  [place] [id number] [phone]          Follow up with Orthopaedics, [facility]. Schedule an appointment as soon as possible for a visit in 3 days.    Contact information:    [address] [address]  [phone]          Follow up with  Emergency Department.    Specialty:  EMERGENCY MEDICINE    Why:  As needed, If symptoms worsen    Contact information:    [address] [facility] [id number] [phone]        Discharge Instructions         Discharge Instructions  Extremity Injury    You were seen today for an injury to an extremity (arm, hand, leg, or foot). You may have a bruise, strain, or fracture (broken bone).    Return to the Emergency Department or see your regular doctor if your injured area is not back to normal within 5-7 days.    Return to the Emergency Department right away if:    Your pain seems to change or get worse or there is pain in a new area.    Your extremity becomes pale, cool, blue, or numb or tingling past the injury.    You have more drainage, redness or pain in the area of the cut or abrasion.    You have pain that you can t control with the medicine recommended or prescribed here, or you have pain that seems too much for your injury.    Your child will not stop  crying or is much more fussy than normal.    You have new symptoms or anything that worries you.    What to Expect:    Your swelling and pain may be worse the day after your injury, but should not be severe and should start getting better after that. You should not have new symptoms and your pain should not get worse.    You may start to get a bruise over the injured area or below the injured area.    Your movement and strength should get better with time.    Some injuries may not show up until after you have left the Emergency Department so it is important to follow-up with your regular doctor.    Your injury may prevent you from working.  Follow-up with your regular doctor to get a work release note.    Pain medications or your injury may make it unsafe to drive or operate machinery.    Home Care:    Apply ice your injured area for 15 minutes at a time, at least 3 times a day. Use a cloth between the ice bag and your skin to prevent frostbite.     Do not sleep with an ice pack or heating pad on, since this can cause burns or skin injury.    Rest your injured area for at least 1-2 days. After that you may start using your extremity again as long as there is not too much pain.     Raise the injured area above the level of your heart as much as possible in the first 1-2 days.    Use Tylenol  (acetaminophen), Motrin (ibuprofen), or Advil  (ibuprofen) for your pain unless you have an allergy or are told not to use these medications by your doctor.  Take the medications as instructed on the package. Tylenol  (acetaminophen) is in many prescription medicines and non-prescription medicines--check all of your medicines to be sure you aren t taking more than 3000 mg per day.    You may use an elastic bandage (Ace  Wrap) if it makes you more comfortable. Wrap it just tight enough to provide light compression, like a new pair of socks feels. Loosen the bandage if you have swelling past the bandage.      Please follow any  other instructions that were discussed with you by your doctor.    MORE INFORMATION:    X-rays:  X-rays done today were read by your doctor but will also be read by a radiologist.  We will contact you if the radiologist sees anything different on the x-ray.  Your regular doctor may also want to review your x-rays on follow-up.    You could have a fracture (break), even if we told you your x-rays were normal. X-rays are not always certain, and some fractures are hard to see and may not show up right away.  Also, your x-ray may look like you have a fracture, even though you do not.  It is important to follow-up with your regular doctor.     Stretching:  If your injury was to your arm or shoulder and your doctor put you in a sling or an immobilizer, it is important that you take off your immobilizer within 3 days and stretch/move your shoulder, unless your doctor specifically tells you to not move your shoulder.  This is to prevent further injury such as a  frozen shoulder .     If you were given a prescription for medicine here today, be sure to read all of the information (including the package insert) that comes with your prescription.  This will include important information about the medicine, its side effects, and any warnings that you need to know about.  The pharmacist who fills the prescription can provide more information and answer questions you may have about the medicine.  If you have questions or concerns that the pharmacist cannot address, please call or return to the Emergency Department.     Opioid Medication Information    Pain medications are among the most commonly prescribed medicines, so we are including this information for all our patients. If you did not receive pain medication or get a prescription for pain medicine, you can ignore it.     You may have been given a prescription for an opioid (narcotic) pain medicine and/or have received a pain medicine while here in the Emergency Department.  These medicines can make you drowsy or impaired. You must not drive, operate dangerous equipment, or engage in any other dangerous activities while taking these medications. If you drive while taking these medications, you could be arrested for DUI, or driving under the influence. Do not drink any alcohol while you are taking these medications.     Opioid pain medications can cause addiction. If you have a history of chemical dependency of any type, you are at a higher risk of becoming addicted to pain medications.  Only take these prescribed medications to treat your pain when all other options have been tried. Take it for as short a time and as few doses as possible. Store your pain pills in a secure place, as they are frequently stolen and provide a dangerous opportunity for children or visitors in your house to start abusing these powerful medications. We will not replace any lost or stolen medicine.  As soon as your pain is better, you should flush all your remaining medication.     Many prescription pain medications contain Tylenol  (acetaminophen), including Vicodin , Tylenol #3 , Norco , Lortab , and Percocet .  You should not take any extra pills of Tylenol  if you are using these prescription medications or you can get very sick.  Do not ever take more than 3000 mg of acetaminophen in any 24 hour period.    All opioids tend to cause constipation. Drink plenty of water and eat foods that have a lot of fiber, such as fruits, vegetables, prune juice, apple juice and high fiber cereal.  Take a laxative if you don t move your bowels at least every other day. Miralax , Milk of Magnesia, Colace , or Senna  can be used to keep you regular.      Remember that you can always come back to the Emergency Department if you are not able to see your regular doctor in the amount of time listed above, if you get any new symptoms, or if there is anything that worries you.    You are at higher risk for blood clot with your  foot in a boot.  Make sure that you take your daily Aspirin and keep moving and talk to your doctor tomorrow about how to minimize your risk of blood clots    24 Hour Appointment Hotline       To make an appointment at any [facility] clinic, call [phone] ([phone]). If you don't have a family doctor or clinic, we will help you find one. [facility] clinics are conveniently located to serve the needs of you and your family.             Review of your medicines      START taking        Dose / Directions Last dose taken    HYDROcodone-acetaminophen 5-325 MG per tablet   Commonly known as:  NORCO   Dose:  1 tablet   Quantity:  15 tablet        Take 1 tablet by mouth every 8 hours as needed for moderate to severe pain   Refills:  0          Our records show that you are taking the medicines listed below. If these are incorrect, please call your family doctor or clinic.        Dose / Directions Last dose taken    aspirin 325 MG EC tablet   Dose:  325 mg   Quantity:  40 tablet        Take 1 tablet (325 mg) by mouth daily   Refills:  1        ATIVAN PO   Dose:  0.25 mg        Take 0.25 mg by mouth daily   Refills:  0        BENADRYL PO   Dose:  25 mg        Take 25 mg by mouth daily as needed   Refills:  0        betamethasone dipropionate 0.05 % cream   Commonly known as:  DIPROSONE        Apply topically 2 times daily   Refills:  0        CENTRUM SILVER per tablet   Dose:  1 tablet        Take 1 tablet by mouth daily   Refills:  0        cholecalciferol 400 UNITS tablet   Commonly known as:  Vitamin D   Dose:  400 Units        Take 400 Units by mouth daily   Refills:  0        glucosamine-chondroitinoitin 750-600 MG Tabs   Dose:  1 tablet        Take 1 tablet by mouth daily   Refills:  0        hydrocortisone 2.5 % cream        Apply topically 2 times daily   Refills:  0        LIPITOR PO   Dose:  10 mg        Take 10 mg by mouth daily   Refills:  0        loratadine 10 MG tablet   Commonly known as:  CLARITIN    Dose:  10 mg        Take 10 mg by mouth daily   Refills:  0        metroNIDAZOLE 1 % gel   Commonly known as:  METROGEL        Apply topically daily   Refills:  0        polyethylene glycol 0.4%- propylene glycol 0.3% 0.4-0.3 % Soln ophthalmic solution   Commonly known as:  SYSTANE ULTRA   Dose:  1 drop        Place 1 drop into both eyes 2 times daily   Refills:  0        saline 0.9 % Aers   Dose:  1 spray        Spray 1 spray in nostril daily   Refills:  0        TOPROL XL PO   Dose:  25 mg        Take 25 mg by mouth   Refills:  0                Prescriptions were sent or printed at these locations (1 Prescription)                   Other Prescriptions                Printed at Department/Unit printer (1 of 1)         HYDROcodone-acetaminophen (NORCO) 5-325 MG per tablet                Procedures and tests performed during your visit     Ankle XR, G/E 3 views, right      Orders Needing Specimen Collection     None      Pending Results     Date and Time Order Name Status Description    9/27/2017 0102 Ankle XR, G/E 3 views, right Preliminary             Pending Culture Results     No orders found from 9/25/2017 to 9/28/2017.            Pending Results Instructions     If you had any lab results that were not finalized at the time of your Discharge, you can call the ED Lab Result RN at [phone]. You will be contacted by this team for any positive Lab results or changes in treatment. The nurses are available 7 days a week from 10A to 6:30P.  You can leave a message 24 hours per day and they will return your call.        Test Results From Your Hospital Stay        9/27/2017  1:28 AM      Narrative     XR ANKLE RT G/E 3 VW  9/27/2017 1:13 AM      HISTORY: Fall with ankle pain, unable to bear weight.     COMPARISON: None.        Impression     IMPRESSION: Small bone fragment at the tip of the lateral malleolus  may be an avulsion fracture. No other fracture or dislocation. Mild  lateral soft tissue swelling.  Multiple vascular calcifications.                Clinical Quality Measure: Blood Pressure Screening     Your blood pressure was checked while you were in the emergency department today. The last reading we obtained was  BP: 136/80 . Please read the guidelines below about what these numbers mean and what you should do about them.  If your systolic blood pressure (the top number) is less than 120 and your diastolic blood pressure (the bottom number) is less than 80, then your blood pressure is normal. There is nothing more that you need to do about it.  If your systolic blood pressure (the top number) is 120-139 or your diastolic blood pressure (the bottom number) is 80-89, your blood pressure may be higher than it should be. You should have your blood pressure rechecked within a year by a primary care provider.  If your systolic blood pressure (the top number) is 140 or greater or your diastolic blood pressure (the bottom number) is 90 or greater, you may have high blood pressure. High blood pressure is treatable, but if left untreated over time it can put you at risk for heart attack, stroke, or kidney failure. You should have your blood pressure rechecked by a primary care provider within the next 4 weeks.  If your provider in the emergency department today gave you specific instructions to follow-up with your doctor or provider even sooner than that, you should follow that instruction and not wait for up to 4 weeks for your follow-up visit.        Thank you for choosing [facility]       Thank you for choosing [facility] for your care. Our goal is always to provide you with excellent care. Hearing back from our patients is one way we can continue to improve our services. Please take a few minutes to complete the written survey that you may receive in the mail after you visit with us. Thank you!        [employer] Information     [employer] lets you send messages to your doctor, view your test results, renew your  "prescriptions, schedule appointments and more. To sign up, go to [email or web address] . Click on \"Log in\" on the left side of the screen, which will take you to the Welcome page. Then click on \"Sign up Now\" on the right side of the page.     You will be asked to enter the access code listed below, as well as some personal information. Please follow the directions to create your username and password.     Your access code is: [email or web address]  Expires: 2017 11:54 AM     Your access code will  in 90 days. If you need help or a new code, please call your [facility] clinic or [phone].        Care EveryWhere ID     This is your Care EveryWhere ID. This could be used by other organizations to access your [facility] medical records  [id number]        Equal Access to Services     MELINDA STEEL : Tracy Elam, dylan mallory, donald hutchins, arlene henry . So [facility] [phone].    ATENCIÓN: Si habla español, tiene a kathleen disposición servicios gratuitos de asistencia lingüística. Llame al [phone].    We comply with applicable federal civil rights laws and [place] laws. We do not discriminate on the basis of race, color, national origin, age, disability sex, sexual orientation or gender identity.            After Visit Summary       This is your record. Keep this with you and show to your community pharmacist(s) and doctor(s) at your next visit.                  "

## 2017-09-27 NOTE — ED NOTES
"Pt ambulated with a walker, otherwise unassisted, for approx. 4 feet until pt stated \"My ankle hurts too much. I don't think I should go any further.\" Pt demonstrated a steady gait with small steps.  "

## 2017-09-27 NOTE — ED NOTES
Patient presents with right ankle pain. Patient had mechanical fall at 1500 yesterday, patient was unable to bear weight at home.  Patient's wife called her son and ambulance was called because patient became pale and diaphoretic when trying to bear weight on ankle at bedtime tonight.  Patient states the light headedness and sweatiness resolved after 3-4 minutes of being in bed.

## 2018-07-23 ENCOUNTER — APPOINTMENT (OUTPATIENT)
Dept: CARDIOLOGY | Facility: CLINIC | Age: 83
End: 2018-07-23
Attending: PHYSICIAN ASSISTANT
Payer: MEDICARE

## 2018-07-23 ENCOUNTER — HOSPITAL ENCOUNTER (OUTPATIENT)
Facility: CLINIC | Age: 83
Setting detail: OBSERVATION
Discharge: HOME | End: 2018-07-24
Attending: EMERGENCY MEDICINE | Admitting: HOSPITALIST
Payer: MEDICARE

## 2018-07-23 DIAGNOSIS — R55 SYNCOPE: ICD-10-CM

## 2018-07-23 LAB
ANION GAP SERPL CALCULATED.3IONS-SCNC: 7 MMOL/L (ref 3–14)
BASOPHILS # BLD AUTO: 0 10E9/L (ref 0–0.2)
BASOPHILS NFR BLD AUTO: 0.2 %
BUN SERPL-MCNC: 14 MG/DL (ref 7–30)
CALCIUM SERPL-MCNC: 8.8 MG/DL (ref 8.5–10.1)
CHLORIDE SERPL-SCNC: 96 MMOL/L (ref 94–109)
CO2 SERPL-SCNC: 27 MMOL/L (ref 20–32)
CREAT SERPL-MCNC: 0.8 MG/DL (ref 0.66–1.25)
DIFFERENTIAL METHOD BLD: ABNORMAL
EOSINOPHIL # BLD AUTO: 0.3 10E9/L (ref 0–0.7)
EOSINOPHIL NFR BLD AUTO: 3.1 %
ERYTHROCYTE [DISTWIDTH] IN BLOOD BY AUTOMATED COUNT: 14.4 % (ref 10–15)
GFR SERPL CREATININE-BSD FRML MDRD: >90 ML/MIN/1.7M2
GLUCOSE SERPL-MCNC: 113 MG/DL (ref 70–99)
HCT VFR BLD AUTO: 37.3 % (ref 40–53)
HGB BLD-MCNC: 13.1 G/DL (ref 13.3–17.7)
IMM GRANULOCYTES # BLD: 0 10E9/L (ref 0–0.4)
IMM GRANULOCYTES NFR BLD: 0.5 %
INTERPRETATION ECG - MUSE: NORMAL
LYMPHOCYTES # BLD AUTO: 1.3 10E9/L (ref 0.8–5.3)
LYMPHOCYTES NFR BLD AUTO: 15.2 %
MAGNESIUM SERPL-MCNC: 1.8 MG/DL (ref 1.6–2.3)
MCH RBC QN AUTO: 35 PG (ref 26.5–33)
MCHC RBC AUTO-ENTMCNC: 35.1 G/DL (ref 31.5–36.5)
MCV RBC AUTO: 100 FL (ref 78–100)
MONOCYTES # BLD AUTO: 1 10E9/L (ref 0–1.3)
MONOCYTES NFR BLD AUTO: 11.6 %
NEUTROPHILS # BLD AUTO: 6.1 10E9/L (ref 1.6–8.3)
NEUTROPHILS NFR BLD AUTO: 69.4 %
NRBC # BLD AUTO: 0 10*3/UL
NRBC BLD AUTO-RTO: 0 /100
PLATELET # BLD AUTO: 207 10E9/L (ref 150–450)
POTASSIUM SERPL-SCNC: 3.7 MMOL/L (ref 3.4–5.3)
RBC # BLD AUTO: 3.74 10E12/L (ref 4.4–5.9)
SODIUM SERPL-SCNC: 130 MMOL/L (ref 133–144)
T4 FREE SERPL-MCNC: 0.92 NG/DL (ref 0.76–1.46)
TROPONIN I SERPL-MCNC: <0.015 UG/L (ref 0–0.04)
TROPONIN I SERPL-MCNC: <0.015 UG/L (ref 0–0.04)
TSH SERPL DL<=0.005 MIU/L-ACNC: 7.9 MU/L (ref 0.4–4)
WBC # BLD AUTO: 8.7 10E9/L (ref 4–11)

## 2018-07-23 PROCEDURE — 99220 ZZC INITIAL OBSERVATION CARE,LEVL III: CPT | Performed by: PHYSICIAN ASSISTANT

## 2018-07-23 PROCEDURE — 85025 COMPLETE CBC W/AUTO DIFF WBC: CPT | Performed by: EMERGENCY MEDICINE

## 2018-07-23 PROCEDURE — 25500064 ZZH RX 255 OP 636: Performed by: HOSPITALIST

## 2018-07-23 PROCEDURE — 84439 ASSAY OF FREE THYROXINE: CPT | Performed by: EMERGENCY MEDICINE

## 2018-07-23 PROCEDURE — 25000132 ZZH RX MED GY IP 250 OP 250 PS 637: Mod: GY | Performed by: PHYSICIAN ASSISTANT

## 2018-07-23 PROCEDURE — 84484 ASSAY OF TROPONIN QUANT: CPT | Performed by: PHYSICIAN ASSISTANT

## 2018-07-23 PROCEDURE — 99214 OFFICE O/P EST MOD 30 MIN: CPT | Mod: 25 | Performed by: INTERNAL MEDICINE

## 2018-07-23 PROCEDURE — 84443 ASSAY THYROID STIM HORMONE: CPT | Performed by: EMERGENCY MEDICINE

## 2018-07-23 PROCEDURE — 25000128 H RX IP 250 OP 636: Performed by: PHYSICIAN ASSISTANT

## 2018-07-23 PROCEDURE — 93306 TTE W/DOPPLER COMPLETE: CPT | Mod: 26 | Performed by: INTERNAL MEDICINE

## 2018-07-23 PROCEDURE — 25000132 ZZH RX MED GY IP 250 OP 250 PS 637: Mod: GY | Performed by: HOSPITALIST

## 2018-07-23 PROCEDURE — 80048 BASIC METABOLIC PNL TOTAL CA: CPT | Performed by: EMERGENCY MEDICINE

## 2018-07-23 PROCEDURE — G0378 HOSPITAL OBSERVATION PER HR: HCPCS

## 2018-07-23 PROCEDURE — 93005 ELECTROCARDIOGRAM TRACING: CPT

## 2018-07-23 PROCEDURE — 36415 COLL VENOUS BLD VENIPUNCTURE: CPT | Performed by: PHYSICIAN ASSISTANT

## 2018-07-23 PROCEDURE — 99285 EMERGENCY DEPT VISIT HI MDM: CPT

## 2018-07-23 PROCEDURE — 93306 TTE W/DOPPLER COMPLETE: CPT

## 2018-07-23 PROCEDURE — A9270 NON-COVERED ITEM OR SERVICE: HCPCS | Mod: GY | Performed by: PHYSICIAN ASSISTANT

## 2018-07-23 PROCEDURE — 84484 ASSAY OF TROPONIN QUANT: CPT | Performed by: EMERGENCY MEDICINE

## 2018-07-23 PROCEDURE — 83735 ASSAY OF MAGNESIUM: CPT | Performed by: EMERGENCY MEDICINE

## 2018-07-23 RX ORDER — ONDANSETRON 2 MG/ML
4 INJECTION INTRAMUSCULAR; INTRAVENOUS EVERY 6 HOURS PRN
Status: DISCONTINUED | OUTPATIENT
Start: 2018-07-23 | End: 2018-07-24 | Stop reason: HOSPADM

## 2018-07-23 RX ORDER — ACETAMINOPHEN 650 MG/1
650 SUPPOSITORY RECTAL EVERY 4 HOURS PRN
Status: DISCONTINUED | OUTPATIENT
Start: 2018-07-23 | End: 2018-07-24 | Stop reason: HOSPADM

## 2018-07-23 RX ORDER — LORAZEPAM 0.5 MG/1
0.25 TABLET ORAL DAILY PRN
Status: DISCONTINUED | OUTPATIENT
Start: 2018-07-23 | End: 2018-07-24 | Stop reason: HOSPADM

## 2018-07-23 RX ORDER — AMOXICILLIN 250 MG
1 CAPSULE ORAL 2 TIMES DAILY PRN
Status: DISCONTINUED | OUTPATIENT
Start: 2018-07-23 | End: 2018-07-24 | Stop reason: HOSPADM

## 2018-07-23 RX ORDER — NALOXONE HYDROCHLORIDE 0.4 MG/ML
.1-.4 INJECTION, SOLUTION INTRAMUSCULAR; INTRAVENOUS; SUBCUTANEOUS
Status: DISCONTINUED | OUTPATIENT
Start: 2018-07-23 | End: 2018-07-24 | Stop reason: HOSPADM

## 2018-07-23 RX ORDER — SODIUM CHLORIDE 9 MG/ML
INJECTION, SOLUTION INTRAVENOUS CONTINUOUS
Status: ACTIVE | OUTPATIENT
Start: 2018-07-23 | End: 2018-07-23

## 2018-07-23 RX ORDER — AMOXICILLIN 250 MG
2 CAPSULE ORAL 2 TIMES DAILY PRN
Status: DISCONTINUED | OUTPATIENT
Start: 2018-07-23 | End: 2018-07-24 | Stop reason: HOSPADM

## 2018-07-23 RX ORDER — ONDANSETRON 4 MG/1
4 TABLET, ORALLY DISINTEGRATING ORAL EVERY 6 HOURS PRN
Status: DISCONTINUED | OUTPATIENT
Start: 2018-07-23 | End: 2018-07-24 | Stop reason: HOSPADM

## 2018-07-23 RX ORDER — ACETAMINOPHEN 325 MG/1
650 TABLET ORAL EVERY 4 HOURS PRN
Status: DISCONTINUED | OUTPATIENT
Start: 2018-07-23 | End: 2018-07-24 | Stop reason: HOSPADM

## 2018-07-23 RX ORDER — LORATADINE 10 MG/1
10 TABLET ORAL DAILY
Status: DISCONTINUED | OUTPATIENT
Start: 2018-07-23 | End: 2018-07-24 | Stop reason: HOSPADM

## 2018-07-23 RX ORDER — LIDOCAINE 40 MG/G
CREAM TOPICAL
Status: DISCONTINUED | OUTPATIENT
Start: 2018-07-23 | End: 2018-07-24 | Stop reason: HOSPADM

## 2018-07-23 RX ADMIN — HUMAN ALBUMIN MICROSPHERES AND PERFLUTREN 3 ML: 10; .22 INJECTION, SOLUTION INTRAVENOUS at 15:34

## 2018-07-23 RX ADMIN — LORAZEPAM 0.25 MG: 0.5 TABLET ORAL at 23:54

## 2018-07-23 RX ADMIN — SODIUM CHLORIDE: 9 INJECTION, SOLUTION INTRAVENOUS at 12:11

## 2018-07-23 RX ADMIN — Medication 1 SPRAY: at 13:53

## 2018-07-23 RX ADMIN — DEXTRAN 70 AND HYPROMELLOSE 2910 1 DROP: 1; 3 SOLUTION/ DROPS OPHTHALMIC at 13:53

## 2018-07-23 RX ADMIN — Medication 1 SPRAY: at 21:47

## 2018-07-23 RX ADMIN — LORATADINE 10 MG: 10 TABLET ORAL at 13:52

## 2018-07-23 RX ADMIN — ASPIRIN 325 MG: 325 TABLET, DELAYED RELEASE ORAL at 13:52

## 2018-07-23 ASSESSMENT — ENCOUNTER SYMPTOMS
DIAPHORESIS: 1
HEADACHES: 0
LIGHT-HEADEDNESS: 1
WEAKNESS: 0
FEVER: 0
COUGH: 0
NUMBNESS: 0
SHORTNESS OF BREATH: 0

## 2018-07-23 NOTE — ED PROVIDER NOTES
History     Chief Complaint:  Loss of Consciousness     HPI   Corwin Puentes is a 89 year old male with a history of hypertension and a previous CVA who presents via EMS accompanied by his wife and son for evaluation following a loss of consciousness. This morning, the patient was sitting down with his wife when he started to feel lightheaded and sweaty and then lost consciousness. The patient did not fall, strike his head, or otherwise injure himself. This event was witnessed by the patient's family and he was unconscious for about 5 minutes. No seizure-like activity was observed during this period. Due to this loss of consciousness, EMS was called to bring the patient into the ED for evaluation. On EMS' evaluation, the patient had a blood sugar of 151 and normal blood pressures. However, on cardiac monitoring the patient was noted to be in atrial fibrillation with a heart rate between 30 and 70. They report that his heart rate would only momentarily dip down into the 30s before returning to the 60-70s. Currently in the ED, the patient reports that he is feeling well. Otherwise, the patient has not had any recent fever, cough, shortness of breath, chest pain, headache, numbness, or weakness.     Allergies:  Alendronic acid   Risedronic acid      Medications:    aspirin  MG EC tablet  Atorvastatin Calcium (LIPITOR PO)  betamethasone dipropionate (DIPROSONE) 0.05 % cream  cholecalciferol (VITAMIN D) 400 UNITS tablet  DiphenhydrAMINE HCl (BENADRYL PO)  glucosamine-chondroitinoitin 750-600 MG TABS  HYDROcodone-acetaminophen (NORCO) 5-325 MG per tablet  hydrocortisone 2.5 % cream  loratadine (CLARITIN) 10 MG tablet  LORazepam (ATIVAN PO)  Metoprolol Succinate (TOPROL XL PO)  metroNIDAZOLE (METROGEL) 1 % gel  Multiple Vitamins-Minerals (CENTRUM SILVER) per tablet  polyethylene glycol 0.4%- propylene glycol 0.3% (SYSTANE ULTRA) 0.4-0.3 % SOLN ophthalmic solution  saline 0.9 % AERS    Past Medical History:   "  Hypertension  Abdominal aortic aneurysm   Premature atrial contraction  Cerebrovascular accident   Age-related osteoporosis   Anxiety    Past Surgical History:    Left ankle fusion   Right knee replacement     Family History:    History reviewed. No pertinent family history.     Social History:  Tobacco use:    Never smoker  Alcohol use:    Positive   Marital status:       Accompanied to ED by:  EMS, wife, and son      Review of Systems   Constitutional: Positive for diaphoresis. Negative for fever.   Respiratory: Negative for cough and shortness of breath.    Cardiovascular: Negative for chest pain.   Neurological: Positive for syncope and light-headedness. Negative for weakness, numbness and headaches.   All other systems reviewed and are negative.    Physical Exam   First Vitals:  BP: (!) 155/92  Heart Rate: 74  Temp: 97.7  F (36.5  C)  Resp: 16  Height: 172.7 cm (5' 8\")  Weight: 62.6 kg (138 lb)  SpO2: 99 %    Physical Exam  General: Elderly. Alert and cooperative with exam. Patient in mild distress. Normal mentation.  Head:  Scalp is NC/AT  Eyes:  No scleral icterus, PERRL, EOMI   ENT:  The external nose and ears are normal. The oropharynx is normal and without erythema; mucus membranes are moist. Uvula midline, no evidence of deep space infection.  Neck:  Normal range of motion without rigidity.  CV:  Regular rate and rhythm with occasional premature beat     No pathologic murmur   Resp:  Breath sounds are clear bilaterally    Non-labored, no retractions or accessory muscle use  GI:  Abdomen is soft, no distension, no tenderness. No peritoneal signs  MS:  No lower extremity edema     No midline cervical, thoracic, or lumbar tenderness  Skin:  Warm and dry, No rash or lesions noted.  Neuro: Oriented x 3. No gross motor deficits.    Strength and sensation grossly intact in all 4 extremities.      Cranial nerves 2-12 intact.    GCS: 15    Emergency Department Course   ECG (9:23:32):  Indication: " Screening for cardiovascular disease.   Rate 65 bpm. FL interval 166 ms. QRS duration 90 ms. QT/QTc 406/422 ms. P-R-T axes 51 56 8.   Interpretation: Sinus rhythm with sinus arrhythmia with occasional premature ventricular complexes, T wave inversions III and aVF.   Agree with computer interpretation. Yes    Interpreted at 0936 by Dr. Joshi.      Laboratory:  CBC: HGB 13.1 low, o/w WNL (WBC 8.7, )  BMP:  low, Glucose 113 high, o/w WNL (Creatinine 0.80)  Magnesium: 1.8    Troponin I 0925: <0.015  TSH with free T4 reflex: 7.90 high   T4 free: 0.92     Emergency Department Course:  Patient was brought to the ED via EMS.     Nursing notes and vitals reviewed.  0919: I performed an exam of the patient as documented above.     1029: I updated and reassessed the patient.     1036: I spoke with Dr. Rodriguez of the hospitalist service regarding patient's presentation, findings, and plan of care.     Findings and plan explained to the Patient and spouse and son who consents to admission. Discussed the patient with Dr. Rodriguez, who will admit the patient to an obs bed for further monitoring, evaluation, and treatment.     Impression & Plan      Medical Decision Making:  Corwin Puentes is a 89 year old male who presents with a history and clinical exam consistent with syncope.  I considered a broad differential for their syncope today including cardiac arrhythmia, ACS, aortic stenosis or other acute valvular dysfunction, HOCM, PE, orthostatic hypotension, drugs, medication side effect, situational, carotid hypersensitivity, seizure, TIA, stroke, cerebral insufficiency, vasovagal, etc.  Patient has no signs at this point of an acute stroke, PE, dissection, acute coronary syndrome.  Patient's EKG and lab work in the ED were essentially unremarkable in the range of dramatic throughout ED course.  Given that patient was unresponsive for 5 minutes per family and EMS reported atrial fibrillation with brief episodes  of bradycardia on arrival there is concern that episode may have been secondary to arrhythmia.  Will admit to observation with the hospitalist service for further evaluation and care.    Diagnosis:    ICD-10-CM   1. Syncope R55       Disposition:  Admitted to Dr. Rodriguez.       Ron WASHINGTON, am serving as a scribe at 9:19 AM on 7/23/2018 to document services personally performed by Joey Joshi DO based on my observations and the provider's statements to me.     EMERGENCY DEPARTMENT       Joey Joshi DO  07/23/18 1645

## 2018-07-23 NOTE — PLAN OF CARE
Problem: Patient Care Overview  Goal: Plan of Care/Patient Progress Review  Outcome: Improving   Observation goals PRIOR TO DISCHARGE     Comments: -diagnostic tests and consults completed and resulted: Goal not met. Plan for EP to see pt in the morning.  -vital signs normal or at patient baseline: Goal met. VSS and orthostatic BPs/HRs stable. Tele SR with PACs.  -returns to baseline functional status: Goal met. Up with SBA, steady.   -safe disposition plan has been identified: Goal met. Pt will discharge home when medically cleared.  Nurse to notify provider when observation goals have been met and patient is ready for discharge.

## 2018-07-23 NOTE — PHARMACY-ADMISSION MEDICATION HISTORY
Admission medication history interview status for the 7/23/2018  admission is complete. See EPIC admission navigator for prior to admission medications     Medication history source reliability:Good    Actions taken by pharmacist (provider contacted, etc): interview with patient and son.     Additional medication history information not noted on PTA med list :None    Medication reconciliation/reorder completed by provider prior to medication history? No    Time spent in this activity: 15 min    Prior to Admission medications    Medication Sig Last Dose Taking? Auth Provider   aspirin  MG EC tablet Take 1 tablet (325 mg) by mouth daily 7/22/2018 at am Yes Natalie Pollard MD   Atorvastatin Calcium (LIPITOR PO) Take 10 mg by mouth daily  7/22/2018 at am Yes Reported, Patient   betamethasone dipropionate (DIPROSONE) 0.05 % cream Apply topically 2 times daily as needed  prn Yes Reported, Patient   cholecalciferol (VITAMIN D) 400 UNITS tablet Take 400 Units by mouth daily 7/22/2018 at am Yes Unknown, Entered By History   DiphenhydrAMINE HCl (BENADRYL PO) Take 25 mg by mouth daily as needed prn Yes Unknown, Entered By History   hydrocortisone 1 % CREA cream Apply topically 2 times daily as needed for itching prn Yes Unknown, Entered By History   loratadine (CLARITIN) 10 MG tablet Take 10 mg by mouth daily 7/22/2018 at am Yes Reported, Patient   LORazepam (ATIVAN PO) Take 0.25 mg by mouth daily as needed  prn Yes Unknown, Entered By History   Metoprolol Succinate (TOPROL XL PO) Take 12.5 mg by mouth 2 times daily 7/22/2018 at pm Yes Unknown, Entered By History   metroNIDAZOLE (METROGEL) 1 % gel Apply topically daily as needed  prn Yes Reported, Patient   Multiple Vitamins-Minerals (PRESERVISION AREDS PO) Take 1 capsule by mouth 2 times daily 7/22/2018 at pm Yes Unknown, Entered By History   polyethylene glycol 0.4%- propylene glycol 0.3% (SYSTANE ULTRA) 0.4-0.3 % SOLN ophthalmic solution Place 1 drop into both  eyes 2 times daily 7/23/2018 at am Yes Unknown, Entered By History   saline 0.9 % AERS Spray 1 spray in nostril 2 times daily  7/22/2018 at pm Yes Unknown, Entered By History

## 2018-07-23 NOTE — ED NOTES
"[facility]  ED Nurse Handoff Report    ED Chief complaint: Syncope (pt was in seated position when pt felt sweaty and light-headed. Hx of TIA. In A-fib 30-60s HR)      ED Diagnosis:   Final diagnoses:   Syncope       Code Status: Full Code    Allergies:   Allergies   Allergen Reactions     Alendronic Acid      Mold      Peanut-Derived      Pollen Extract      Risedronic Acid [Risedronate]        Activity level - Baseline/Home:  Independent    Activity Level - Current:   Independent     Needed?: No    Isolation: No  Infection: Not Applicable  Bariatric?: No    Vital Signs:   Vitals:    07/23/18 0922 07/23/18 0935 07/23/18 1000 07/23/18 1030   BP: (!) 155/92 150/87 155/85 156/85   Resp: 16 11 14 11   Temp: 97.7  F (36.5  C)      TempSrc: Oral      SpO2: 99% 99% 99% 98%   Weight: 62.6 kg (138 lb)      Height: 1.727 m (5' 8\")          Cardiac Rhythm: ,   Cardiac  Cardiac Rhythm: Normal sinus rhythm    Pain level:      Is this patient confused?: No   [place] - Suicide Severity Rating Scale Completed?  Yes  If yes, what color did the patient score?  White    Patient Report: Initial Complaint: syncope  Focused Assessment: pt was in seated position at table when pt got sweaty and light-headed; pt had syncopal at table with family present. Pt was lowered to floor per dispatcher. While en route, EMS noted pt to have HRs 30-60s in A-fib. Pt has been in NSR while in ED with no other complaints. Pt to be placed on cardiac monitor during admission to determine if pacer is needed.   Tests Performed: EKG, labs  Abnormal Results:   Results for orders placed or performed during the hospital encounter of 07/23/18   CBC with platelets + differential   Result Value Ref Range    WBC 8.7 4.0 - 11.0 10e9/L    RBC Count 3.74 (L) 4.4 - 5.9 10e12/L    Hemoglobin 13.1 (L) 13.3 - 17.7 g/dL    Hematocrit 37.3 (L) 40.0 - 53.0 %     78 - 100 fl    MCH 35.0 (H) 26.5 - 33.0 pg    MCHC 35.1 31.5 - 36.5 g/dL    RDW " 14.4 10.0 - 15.0 %    Platelet Count 207 150 - 450 10e9/L    Diff Method Automated Method     % Neutrophils 69.4 %    % Lymphocytes 15.2 %    % Monocytes 11.6 %    % Eosinophils 3.1 %    % Basophils 0.2 %    % Immature Granulocytes 0.5 %    Nucleated RBCs 0 0 /100    Absolute Neutrophil 6.1 1.6 - 8.3 10e9/L    Absolute Lymphocytes 1.3 0.8 - 5.3 10e9/L    Absolute Monocytes 1.0 0.0 - 1.3 10e9/L    Absolute Eosinophils 0.3 0.0 - 0.7 10e9/L    Absolute Basophils 0.0 0.0 - 0.2 10e9/L    Abs Immature Granulocytes 0.0 0 - 0.4 10e9/L    Absolute Nucleated RBC 0.0    TSH with free T4 reflex   Result Value Ref Range    TSH 7.90 (H) 0.40 - 4.00 mU/L   Magnesium   Result Value Ref Range    Magnesium 1.8 1.6 - 2.3 mg/dL   Basic metabolic panel   Result Value Ref Range    Sodium 130 (L) 133 - 144 mmol/L    Potassium 3.7 3.4 - 5.3 mmol/L    Chloride 96 94 - 109 mmol/L    Carbon Dioxide 27 20 - 32 mmol/L    Anion Gap 7 3 - 14 mmol/L    Glucose 113 (H) 70 - 99 mg/dL    Urea Nitrogen 14 7 - 30 mg/dL    Creatinine 0.80 0.66 - 1.25 mg/dL    GFR Estimate >90 >60 mL/min/1.7m2    GFR Estimate If Black >90 >60 mL/min/1.7m2    Calcium 8.8 8.5 - 10.1 mg/dL   Troponin I   Result Value Ref Range    Troponin I ES <0.015 0.000 - 0.045 ug/L   T4 free   Result Value Ref Range    T4 Free 0.92 0.76 - 1.46 ng/dL   EKG 12 lead   Result Value Ref Range    Interpretation ECG Click View Image link to view waveform and result        Treatments provided: none    Family Comments: wife and son at bedside    OBS brochure/video discussed/provided to patient: Yes    ED Medications: Medications - No data to display    Drips infusing?:  No    For the majority of the shift this patient was Green.   Interventions performed were none.    Severe Sepsis OR Septic Shock Diagnosis Present: No      ED NURSE PHONE NUMBER: *[phone]

## 2018-07-23 NOTE — IP AVS SNAPSHOT
[facility]    [address] [place] [id number]    Phone:  [phone]                                       After Visit Summary   7/23/2018    Corwin Puentes    MRN: [id number]           After Visit Summary Signature Page     I have received my discharge instructions, and my questions have been answered. I have discussed any challenges I see with this plan with the nurse or doctor.    ..........................................................................................................................................  Patient/Patient Representative Signature      ..........................................................................................................................................  Patient Representative Print Name and Relationship to Patient    ..................................................               ................................................  Date                                            Time    ..........................................................................................................................................  Reviewed by Signature/Title    ...................................................              ..............................................  Date                                                            Time

## 2018-07-23 NOTE — PLAN OF CARE
Problem: Patient Care Overview  Goal: Plan of Care/Patient Progress Review  Outcome: Improving   Observation goals PRIOR TO DISCHARGE     Comments: -diagnostic tests and consults completed and resulted: Goal not met. Awaiting echo, cards consult, troponin.   -vital signs normal or at patient baseline: Goal met. VSS and orthostatic BPs/HRs stable.  -returns to baseline functional status: Goal met. Up with SBA, steady.   -safe disposition plan has been identified: Goal met. Pt will discharge home when medically cleared.  Nurse to notify provider when observation goals have been met and patient is ready for discharge.

## 2018-07-23 NOTE — H&P
Admitted:     07/23/2018      PRIMARY CARE PHYSICIAN:  Malathi Johnson MD      CHIEF COMPLAINT:  Syncope.      HISTORY OF PRESENT ILLNESS:  Corwin Puentes is a very pleasant 89-year-old male with a past medical history of hypertension, CVA, atrial fibrillation, SVT, hyponatremia and diastolic dysfunction who presented to the emergency department with complaints of syncope.  The patient states he felt in his normal state of health this morning.  His son was visiting him and his wife in their apartment.  The patient sat down in the kitchen to eat breakfast when he had sudden loss of consciousness and slumped forward.  He did not fall, strike his head or otherwise have any injuries.  This event was witnessed by the patient's family and his son states he called EMS.  He believes the patient was unconscious for approximately 5 minutes, but possibly more.  No seizure-like activity was observed during this period.  He did spontaneously regain consciousness without any noted confusion or other deficits.  He states he felt slightly nauseous prior to the event, but denied any chest pain, palpitations, dizziness, shortness of breath, abdominal pain.  After regaining consciousness he felt very diaphoretic, but otherwise was asymptomatic.        On EMS arrival, the patient had a blood sugar of 151 and normal blood pressures.  On his 12-lead EKG he was noted to be in atrial fibrillation with heart rate between 30-70.  It was reported that his heart rate would only momentarily dip down into the 30s before return to the 60s-70s.  The patient reports that he is otherwise feeling well and has not had any recent illness, a fever, cough.  No focal weakness, numbness or tingling.  He states only 1 previous syncopal episode he can recollect that was after straining hard while having a BM.      In the emergency department, the patient was evaluated by Dr. Joshi.  There, he was found to have a blood pressure 155/92 with heart rate of 74.   He was afebrile, saturating in the upper 90s on room air.  EKG showed sinus rhythm with sinus arrhythmia and occasional PVCs.  There were new T-wave inversions in leads III and aVF.  Labs showed a hemoglobin 13.1 and CBC was otherwise within normal limits with WBC 8.7, platelet count 207.  BMP shows sodium 130, glucose 113, creatinine 0.80.  His troponin was negative.  TSH was elevated at 7.90, but with normal free T4 of 0.92.  The patient is being registered to observation for further syncope workup.      PAST MEDICAL HISTORY:   1.  Hypertension.   2.  Reported history of atrial fibrillation.   3.  Abdominal aortic aneurysm.   4.  Small left occipital lobe CVA in 08/2017 with no residual deficits.   5.  Osteoporosis.   6.  Anxiety.   7.  Abdominal aortic aneurysm monitored by his physicians at [facility].   8.  History of SVT.    9.  History of hyponatremia.   10.  Left ventricular diastolic dysfunction.      PAST SURGICAL HISTORY:   1.  Left ankle fusion.   2.  Right total knee replacement.      FAMILY HISTORY:  This was reviewed with the patient and is noncontributory to this presentation.      SOCIAL HISTORY:  The patient is a lifelong nonsmoker.  He drinks 1 hard liquor beverage at night.  He is  and resides in an apartment with his wife.  He is normally independently ambulatory.      PRIOR TO ADMISSION MEDICATIONS:    Prior to Admission medications    Medication Sig Last Dose Taking? Auth Provider   aspirin  MG EC tablet Take 1 tablet (325 mg) by mouth daily 7/22/2018 at am Yes Natalie Pollard MD   Atorvastatin Calcium (LIPITOR PO) Take 10 mg by mouth daily  7/22/2018 at am Yes Reported, Patient   betamethasone dipropionate (DIPROSONE) 0.05 % cream Apply topically 2 times daily as needed  prn Yes Reported, Patient   cholecalciferol (VITAMIN D) 400 UNITS tablet Take 400 Units by mouth daily 7/22/2018 at am Yes Unknown, Entered By History   DiphenhydrAMINE HCl (BENADRYL PO) Take 25 mg by  mouth daily as needed prn Yes Unknown, Entered By History   hydrocortisone 1 % CREA cream Apply topically 2 times daily as needed for itching prn Yes Unknown, Entered By History   loratadine (CLARITIN) 10 MG tablet Take 10 mg by mouth daily 7/22/2018 at am Yes Reported, Patient   LORazepam (ATIVAN PO) Take 0.25 mg by mouth daily as needed  prn Yes Unknown, Entered By History   Metoprolol Succinate (TOPROL XL PO) Take 12.5 mg by mouth 2 times daily 7/22/2018 at pm Yes Unknown, Entered By History   metroNIDAZOLE (METROGEL) 1 % gel Apply topically daily as needed  prn Yes Reported, Patient   Multiple Vitamins-Minerals (PRESERVISION AREDS PO) Take 1 capsule by mouth 2 times daily 7/22/2018 at pm Yes Unknown, Entered By History   polyethylene glycol 0.4%- propylene glycol 0.3% (SYSTANE ULTRA) 0.4-0.3 % SOLN ophthalmic solution Place 1 drop into both eyes 2 times daily 7/23/2018 at am Yes Unknown, Entered By History   saline 0.9 % AERS Spray 1 spray in nostril 2 times daily  7/22/2018 at pm Yes Unknown, Entered By History     ALLERGIES:  FOSAMAX.      REVIEW OF SYSTEMS:  A complete 10-point review of systems was performed and is negative other than the items previously mentioned in the above HPI.      PHYSICAL EXAMINATION:   VITAL SIGNS:  Blood pressure 155/92, heart rate 74 beats per minute, temperature 97.7, respiratory rate 16, oxygen saturation 99% on room air.   GENERAL:  The patient is alert, oriented to person, place and situation, cooperative, lying in bed, no apparent distress.   EYES:  Pupils equal and round and reactive to light, extraocular movements intact.     HEENT:  Head normocephalic, atraumatic.  Throat, lips, mucosa and tongue appear moist and normal.  Posterior pharynx clear.   NECK:  Supple.   CARDIOVASCULAR:  Heart regular rate and rhythm, no murmurs, rubs or gallops.  Distal pulses are intact.  No edema.   PULMONARY:  Lungs clear to auscultation bilaterally, no crackles, wheezes or rhonchi.   Breathing is unlabored.   GASTROINTESTINAL:  Abdomen is soft, nontender, nondistended with normoactive bowel sounds.   MUSCULOSKELETAL:  The patient is able to move all 4 extremities equally.  Limbs are atraumatic.   NEUROLOGIC:  Alert, cranial nerves II-XII are intact and symmetric.  Motor function is intact, strength 5/5 in all 4 extremities.   SKIN:  Warm, dry and nondiaphoretic.   PSYCHIATRIC:  Normal mood and affect.      LABORATORY DATA:  CBC:  WBC of 8.7, hemoglobin 13.1, hematocrit 37.3, platelet count 207,000, RBC 3.74.  CMP:  Sodium 130, potassium 3.7, chloride 96, creatinine 0.80, glucose 113, otherwise within normal limits.  Troponin I less than 0.015.  Free T4 0.92.  TSH 7.90.      EKG:  This was personally reviewed.  This shows sinus rhythm with sinus arrhythmia and occasional PVCs.  T-wave inversions in leads III and aVF.      ASSESSMENT AND PLAN:  Corwin Puentes is a very pleasant 89-year-old male with a past medical history of hypertension, CVA, atrial fibrillation, hyponatremia, supraventricular tachycardia and diastolic dysfunction who presented to the emergency department via EMS after a syncopal episode.  He is registered to observation unit for further evaluation.     1.  Syncopal episode:  The patient had some loss of consciousness while sitting down at the table before breakfast without any preceding symptoms.  Loss of consciousness was witnessed by family and lasted approximately 5 minutes.  On EMS arrival, he was found to have a blood sugar 151 and normal blood pressures, but on EKG this showed atrial fibrillation with variable heart rate between 30-70.  The patient had no seizure-like activity.  No chest pain, palpitations, shortness of breath or other concerning findings.  Here in the emergency department he is back in his sinus rhythm with some T-wave inversions in leads III and aVF.  Troponin is negative.  Initial lab work unremarkable other than a sodium of 130 which seems to be chronic.   Vital signs stable.  Admit to observation.  We will monitor on telemetry.  We will obtain an echocardiogram.  We will repeat a troponin, check orthostatic blood pressures, hydrate with IV fluids for 1 liter.  We will consult cardiology given his atrial fibrillation with slow ventricular response noted by EMS.  If his workup here is negative, consider a Holter monitor at discharge.     2.  Atrial fibrillation:  The patient previously is on metoprolol succinate 12.5 mg b.i.d. for rate control.  He is on a full-dose aspirin.  His CHADS-VASc score is 5.  He states he has never been recommended to be on full anticoagulation in the past, but does not seem to have any contraindications for this.  Given his bradycardia noted by EMS, we will hold Toprol-XL for now and consult cardiology.  Continue with full- dose aspirin.  Monitor telemetry.     3.  History of CVA:  This was in 08/2017 and MRI showed small left occipital lobe infarct.  He has no residual deficits.  Again, CHADS2-VASc score is elevated, so he should consider full anticoagulation with warfarin or NOAC if no contraindications.     4.  Hyponatremia:  This appears to be chronic.  He will be receiving normal saline for 1 liter of maintenance at 100 mL per hour.  Recheck BMP in the morning.     5.  Hx Abdominal aortic aneurysm:  This is monitored by his physician at the [facility].     6.  Hyperlipidemia:  Resume prior to admission statin upon discharge.     7.  Subclinical hypothyroidism:  TSH is elevated at 7.90 with normal free T4 of 0.92.  Recommend follow up with PCP for repeat thyroid function tests in 4-6 weeks.     8.  DVT prophylaxis:  This will be with ambulation, anticipate a short stay.      CODE STATUS:  FULL CODE confirmed on admission.      DISPOSITION:  Anticipate a short stay.  If his workup remains negative and following cardiology recommendations, likely discharge 07/24.      The patient was discussed with Dr. Gabriel Rodriguez of the [facility].  He is in agreement with my assessment and plan of care.         LISSETH ZARATE MD       As dictated by BRIAN DONAHUE PA-C            D: 2018   T: 2018   MT: KHALIDA      Name:     SANTA DAS   MRN:      [id number]        Account:      [id number]   :      1927        Admitted:     2018                   Document: [id number]

## 2018-07-23 NOTE — IP AVS SNAPSHOT
MRN:[id number]                      After Visit Summary   7/23/2018    Corwin Puentes    MRN: [id number]           Thank you!     Thank you for choosing [facility] for your care. Our goal is always to provide you with excellent care. Hearing back from our patients is one way we can continue to improve our services. Please take a few minutes to complete the written survey that you may receive in the mail after you visit with us. Thank you!        Patient Information     Date Of Birth          5/27/1927        About your hospital stay     You were admitted on:  July 23, 2018 You last received care in the:  [facility] Observation Unit    You were discharged on:  July 24, 2018        Reason for your hospital stay       You were admitted for evaluation of syncope and bradycardia. You were evaluated by Electrophysiology and the recommendation is that you stop your Metoprolol (this can slow your heart rate) and wear a 30 day event monitor and follow up with Dr. Sanchez as planned 8/30.     Follow up for repeat thyroid testing in 4-6 weeks. This can be done through [facility] at your regular follow up.                  Who to Call     For medical emergencies, please call 911.  For non-urgent questions about your medical care, please call your primary care provider or clinic, [phone]          Attending Provider     Provider Specialty    Joey Joshi DO Emergency Medicine    Gabriel Rodriguez MD Internal Medicine       Primary Care Provider Office Phone # Fax #    Malathi Johnson [phone]      After Care Instructions     Activity       Your activity upon discharge: no driving            Diet       Follow this diet upon discharge: Orders Placed This Encounter      Regular Diet Adult                  Follow-up Appointments     Follow-up and recommended labs and tests        1. Follow up with Dr. Sanchez 8/30  2. Follow up with primary MD or [facility] in 4-6 weeks for  "thyroid testing.                  Your next 10 appointments already scheduled     Aug 30, 2018  4:15 PM CDT   [facility] EP RETURN with Ken Sanchez MD   [facility] ([facility])    [id number]  [facility] [id number] [phone] OPT 2              Pending Results     No orders found for last 3 day(s).            Statement of Approval     Ordered          18 1108  I have reviewed and agree with all the recommendations and orders detailed in this document.  EFFECTIVE NOW     Approved and electronically signed by:  Josefina Wiggins PA-C             Admission Information     Date & Time Provider Department Dept. Phone    2018 Gabriel Rodriguez MD [facility] Unit [phone]      Your Vitals Were     Blood Pressure Pulse Temperature Respirations Height Weight    144/84 (BP Location: Right arm) 96 96.3  F (35.7  C) (Oral) 15 1.727 m (5' 8\") 59.6 kg (131 lb 8 oz)    Pulse Oximetry BMI (Body Mass Index)                96% 19.99 kg/m2          [employer] Information     [employer] lets you send messages to your doctor, view your test results, renew your prescriptions, schedule appointments and more. To sign up, go to [email or web address] . Click on \"Log in\" on the left side of the screen, which will take you to the Welcome page. Then click on \"Sign up Now\" on the right side of the page.     You will be asked to enter the access code listed below, as well as some personal information. Please follow the directions to create your username and password.     Your access code is: [email or web address]  Expires: 10/22/2018 11:13 AM     Your access code will  in 90 days. If you need help or a new code, please call your [facility] or [phone].        Care EveryWhere ID     This is your Care EveryWhere ID. This could be used by other organizations to access your [facility] medical records  [id number]        Equal Access to " Services     [facility]: Hadii aad ku hadhilarylianne Elam, waaxda luqadaha, qaybta kaalmada liset, arlene henry . So [facility] [phone].    ATENCIÓN: Si habla tang, tiene a kathleen disposición servicios gratuitos de asistencia lingüística. Llame al [phone].    We comply with applicable federal civil rights laws and [place] laws. We do not discriminate on the basis of race, color, national origin, age, disability, sex, sexual orientation, or gender identity.               Review of your medicines      CONTINUE these medicines which have NOT CHANGED        Dose / Directions    aspirin 325 MG EC tablet   Used for:  Cerebrovascular accident (CVA) due to stenosis of cerebral artery (H)        Dose:  325 mg   Take 1 tablet (325 mg) by mouth daily   Quantity:  40 tablet   Refills:  1       ATIVAN PO        Dose:  0.25 mg   Take 0.25 mg by mouth daily as needed   Refills:  0       BENADRYL PO        Dose:  25 mg   Take 25 mg by mouth daily as needed   Refills:  0       betamethasone dipropionate 0.05 % cream   Commonly known as:  DIPROSONE        Apply topically 2 times daily as needed   Refills:  0       cholecalciferol 400 units tablet   Commonly known as:  Vitamin D        Dose:  400 Units   Take 400 Units by mouth daily   Refills:  0       hydrocortisone 1 % Crea cream        Apply topically 2 times daily as needed for itching   Refills:  0       LIPITOR PO        Dose:  10 mg   Take 10 mg by mouth daily   Refills:  0       loratadine 10 MG tablet   Commonly known as:  CLARITIN        Dose:  10 mg   Take 10 mg by mouth daily   Refills:  0       metroNIDAZOLE 1 % gel   Commonly known as:  METROGEL        Apply topically daily as needed   Refills:  0       polyethylene glycol 0.4%- propylene glycol 0.3% 0.4-0.3 % Soln ophthalmic solution   Commonly known as:  SYSTANE ULTRA        Dose:  1 drop   Place 1 drop into both eyes 2 times daily   Refills:  0       PRESERVISION AREDS PO         Dose:  1 capsule   Take 1 capsule by mouth 2 times daily   Refills:  0       saline 0.9 % Aers        Dose:  1 spray   Spray 1 spray in nostril 2 times daily   Refills:  0         STOP taking     TOPROL XL PO                    Protect others around you: Learn how to safely use, store and throw away your medicines at www.disposemymeds.org.             Medication List: This is a list of all your medications and when to take them. Check marks below indicate your daily home schedule. Keep this list as a reference.      Medications           Morning Afternoon Evening Bedtime As Needed    aspirin 325 MG EC tablet   Take 1 tablet (325 mg) by mouth daily   Last time this was given:  325 mg on 7/24/2018  8:13 AM                                   ATIVAN PO   Take 0.25 mg by mouth daily as needed                                   BENADRYL PO   Take 25 mg by mouth daily as needed                                   betamethasone dipropionate 0.05 % cream   Commonly known as:  DIPROSONE   Apply topically 2 times daily as needed                                   cholecalciferol 400 units tablet   Commonly known as:  Vitamin D   Take 400 Units by mouth daily                                   hydrocortisone 1 % Crea cream   Apply topically 2 times daily as needed for itching                                   LIPITOR PO   Take 10 mg by mouth daily                                   loratadine 10 MG tablet   Commonly known as:  CLARITIN   Take 10 mg by mouth daily   Last time this was given:  10 mg on 7/24/2018  8:13 AM                                   metroNIDAZOLE 1 % gel   Commonly known as:  METROGEL   Apply topically daily as needed                                   polyethylene glycol 0.4%- propylene glycol 0.3% 0.4-0.3 % Soln ophthalmic solution   Commonly known as:  SYSTANE ULTRA   Place 1 drop into both eyes 2 times daily                                      PRESERVISION AREDS PO   Take 1 capsule by mouth 2 times daily                                       saline 0.9 % Aers   Spray 1 spray in nostril 2 times daily

## 2018-07-23 NOTE — PROGRESS NOTES
RECEIVING UNIT ED HANDOFF REVIEW    ED Nurse Handoff Report was reviewed by: Beth Mcadams on July 23, 2018 at 10:49 AM

## 2018-07-23 NOTE — ED NOTES
Bed: ED04  Expected date:   Expected time:   Means of arrival:   Comments:  [place] - 89 M syncope eta 0891

## 2018-07-23 NOTE — CONSULTS
[facility]  Cardiology Consultation     Date of Admission:  7/23/2018  Date of Consult (When I saw the patient): 07/23/18    Assessment & Plan   Corwin Puentes is a 89 year old male who was admitted on 7/23/2018. He has a past medical history significant for hypertension, CVA, atrial fibrillation (not on full anticoagulation), SVT, hyponatremia and diastolic dysfunction. I was asked to see the patient for for syncope. He was noted to have atrial fibrillation by EMS. He was in SR with occasional PVCs in the ED. He follows with a cardiologist at [facility] and is due for an annual follow up in August. He states that his cardiologist through [facility] increased his aspirin to 325 mg post his CVA, but has not been on full anticoagulation.     1. Syncope  2. Atrial [id number] 4 (HTN, age, CVA)  3. Abdominal aortic aneurysm  4. History of SVT  5. Small left occipital CVA in 8/2017 without residual deficits  6. LV diastolic dysfunction  7. Hyperlipidemia  8. Hyponatremia-Chronic    PLAN:  1. Monitor over night and have electrophysiology see in the morning for potential sinus node dyfunction  2. Consider full anticoagulation with Warfarin or NOAC due to elevated CHADS2-VASc  3. Echocardiogram    YANCI JONES, APRN, CNP    Code Status    Full Code    Reason for Consult   Reason for consult: Syncope and atrial fibrillation    Primary Care Physician   Malathi Johnson    Chief Complaint   Sycnope    History is obtained from the patient and electronic health record    History of Present Illness   Corwin Puentes is a 89 year old male who presents with syncope with loss of consciousness for approximately 5 minutes while sitting in his living room prior to breakfast. He had no precipitating symptoms. The loss of consciousness was witnessed by his family. He states he was diaphoretic upon wakening. On arrival of EMS his EKG showed atrial fibrillation with variable heart rate between 30-70 bpm (HR would dip  to the 30s and then would be maintained in the 60-70) and the initial troponin was negative.    Past Medical History   I have reviewed this patient's medical history and updated it with pertinent information if needed.   Past Medical History:   Diagnosis Date     Atrial fibrillation (H)      Hypertension        Past Surgical History   I have reviewed this patient's surgical history and updated it with pertinent information if needed.  Past Surgical History:   Procedure Laterality Date     ORTHOPEDIC SURGERY      left ankle fusion     ORTHOPEDIC SURGERY      right knee replacement        Prior to Admission Medications   Prior to Admission Medications   Prescriptions Last Dose Informant Patient Reported? Taking?   Atorvastatin Calcium (LIPITOR PO) 7/22/2018 at am Spouse/Significant Other Yes Yes   Sig: Take 10 mg by mouth daily    DiphenhydrAMINE HCl (BENADRYL PO) prn Spouse/Significant Other Yes Yes   Sig: Take 25 mg by mouth daily as needed   LORazepam (ATIVAN PO) prn Spouse/Significant Other Yes Yes   Sig: Take 0.25 mg by mouth daily as needed    Metoprolol Succinate (TOPROL XL PO) 7/22/2018 at pm Spouse/Significant Other Yes Yes   Sig: Take 12.5 mg by mouth 2 times daily   Multiple Vitamins-Minerals (PRESERVISION AREDS PO) 7/22/2018 at pm Spouse/Significant Other Yes Yes   Sig: Take 1 capsule by mouth 2 times daily   aspirin  MG EC tablet 7/22/2018 at am Spouse/Significant Other Yes Yes   Sig: Take 1 tablet (325 mg) by mouth daily   betamethasone dipropionate (DIPROSONE) 0.05 % cream prn Spouse/Significant Other Yes Yes   Sig: Apply topically 2 times daily as needed    cholecalciferol (VITAMIN D) 400 UNITS tablet 7/22/2018 at am Spouse/Significant Other Yes Yes   Sig: Take 400 Units by mouth daily   hydrocortisone 1 % CREA cream prn Spouse/Significant Other Yes Yes   Sig: Apply topically 2 times daily as needed for itching   loratadine (CLARITIN) 10 MG tablet 7/22/2018 at am Spouse/Significant Other Yes Yes    Sig: Take 10 mg by mouth daily   metroNIDAZOLE (METROGEL) 1 % gel prn Spouse/Significant Other Yes Yes   Sig: Apply topically daily as needed    polyethylene glycol 0.4%- propylene glycol 0.3% (SYSTANE ULTRA) 0.4-0.3 % SOLN ophthalmic solution 7/23/2018 at am Spouse/Significant Other Yes Yes   Sig: Place 1 drop into both eyes 2 times daily   saline 0.9 % AERS 7/22/2018 at pm Spouse/Significant Other Yes Yes   Sig: Spray 1 spray in nostril 2 times daily       Facility-Administered Medications: None     Allergies   Allergies   Allergen Reactions     Alendronic Acid      Mold      Peanut-Derived      Pollen Extract      Risedronic Acid [Risedronate]        Social History   I have reviewed this patient's social history and updated it with pertinent information if needed. Corwin Puentes  reports that he has never smoked. He has never used smokeless tobacco. He reports that he drinks alcohol. He reports that he does not use illicit drugs.    Family History   Family history reviewed with patient and is noncontributory.    Review of Systems   The 10 point Review of Systems is negative other than noted in the HPI.     Physical Exam   Temp: 95.1  F (35.1  C) Temp src: Oral BP: 167/82 Pulse: 66 Heart Rate: 63 Resp: 18 SpO2: 99 % O2 Device: None (Room air)    Vital Signs with Ranges  Temp:  [95.1  F (35.1  C)-97.7  F (36.5  C)] 95.1  F (35.1  C)  Pulse:  [66] 66  Heart Rate:  [60-74] 63  Resp:  [11-24] 18  BP: (150-167)/(82-92) 167/82  SpO2:  [98 %-100 %] 99 %  131 lbs 8 oz    Constitutional:   NAD   Skin:   Warm and dry   Head:   Nontraumatic   Neck:   no JVD   Lungs:   symmetric, clear to auscultation   Cardiovascular:   regular rate and rhythm, normal S1 and S2 and no murmur noted   Abdomen:   Benign   Extremities and Back:   No edema   Neurological:   Grossly nonfocal   Data   Recent Labs   Lab Test  07/23/18   0925  08/14/17   0812  08/13/17   0950   WBC  8.7  5.6  9.0   HGB  13.1*  11.6*  12.5*   MCV  100  98  97    PLT  207  148*  164   INR   --    --   1.11      Recent Labs   Lab Test  07/23/18   0925  08/14/17   0812   NA  130*  128*   POTASSIUM  3.7  4.1   CHLORIDE  96  99   BUN  14  12   CR  0.80  0.65*       Recent Labs  Lab 07/23/18  0925   *     No lab results found.  Troponin I ES   Date Value Ref Range Status   07/23/2018 <0.015 0.000 - 0.045 ug/L Final     Comment:     The 99th percentile for upper reference range is 0.045 ug/L.  Troponin values   in the range of 0.045 - 0.120 ug/L may be associated with risks of adverse   clinical events.     08/13/2017  0.000 - 0.045 ug/L Final    <0.015  The 99th percentile for upper reference range is 0.045 ug/L.  Troponin values in   the range of 0.045 - 0.120 ug/L may be associated with risks of adverse   clinical events.     08/13/2017  0.000 - 0.045 ug/L Final    <0.015  The 99th percentile for upper reference range is 0.045 ug/L.  Troponin values in   the range of 0.045 - 0.120 ug/L may be associated with risks of adverse   clinical events.     08/13/2017  0.000 - 0.045 ug/L Final    <0.015  The 99th percentile for upper reference range is 0.045 ug/L.  Troponin values in   the range of 0.045 - 0.120 ug/L may be associated with risks of adverse   clinical events.         Recent Labs   Lab Test  07/23/18   0925   MAG  1.8       Lab Results   Component Value Date    CHOL 87 08/13/2017     Lab Results   Component Value Date    HDL 45 08/13/2017     Lab Results   Component Value Date    LDL 35 08/13/2017     Lab Results   Component Value Date    TRIG 33 08/13/2017     No results found for: CHOLHDLRATIO       TSH   Date Value Ref Range Status   07/23/2018 7.90 (H) 0.40 - 4.00 mU/L Final

## 2018-07-24 VITALS
HEIGHT: 68 IN | WEIGHT: 131.5 LBS | OXYGEN SATURATION: 96 % | TEMPERATURE: 96.3 F | RESPIRATION RATE: 15 BRPM | DIASTOLIC BLOOD PRESSURE: 84 MMHG | HEART RATE: 96 BPM | BODY MASS INDEX: 19.93 KG/M2 | SYSTOLIC BLOOD PRESSURE: 144 MMHG

## 2018-07-24 LAB
ANION GAP SERPL CALCULATED.3IONS-SCNC: 6 MMOL/L (ref 3–14)
BUN SERPL-MCNC: 14 MG/DL (ref 7–30)
CALCIUM SERPL-MCNC: 8.2 MG/DL (ref 8.5–10.1)
CHLORIDE SERPL-SCNC: 99 MMOL/L (ref 94–109)
CO2 SERPL-SCNC: 27 MMOL/L (ref 20–32)
CREAT SERPL-MCNC: 0.77 MG/DL (ref 0.66–1.25)
ERYTHROCYTE [DISTWIDTH] IN BLOOD BY AUTOMATED COUNT: 14.2 % (ref 10–15)
GFR SERPL CREATININE-BSD FRML MDRD: >90 ML/MIN/1.7M2
GLUCOSE SERPL-MCNC: 95 MG/DL (ref 70–99)
HCT VFR BLD AUTO: 35.3 % (ref 40–53)
HGB BLD-MCNC: 12.3 G/DL (ref 13.3–17.7)
MCH RBC QN AUTO: 34.5 PG (ref 26.5–33)
MCHC RBC AUTO-ENTMCNC: 34.8 G/DL (ref 31.5–36.5)
MCV RBC AUTO: 99 FL (ref 78–100)
PLATELET # BLD AUTO: 170 10E9/L (ref 150–450)
POTASSIUM SERPL-SCNC: 4 MMOL/L (ref 3.4–5.3)
RBC # BLD AUTO: 3.57 10E12/L (ref 4.4–5.9)
SODIUM SERPL-SCNC: 132 MMOL/L (ref 133–144)
WBC # BLD AUTO: 7.1 10E9/L (ref 4–11)

## 2018-07-24 PROCEDURE — 99217 ZZC OBSERVATION CARE DISCHARGE: CPT | Performed by: PHYSICIAN ASSISTANT

## 2018-07-24 PROCEDURE — A9270 NON-COVERED ITEM OR SERVICE: HCPCS | Mod: GY | Performed by: PHYSICIAN ASSISTANT

## 2018-07-24 PROCEDURE — 93270 REMOTE 30 DAY ECG REV/REPORT: CPT | Performed by: INTERNAL MEDICINE

## 2018-07-24 PROCEDURE — 85027 COMPLETE CBC AUTOMATED: CPT | Performed by: PHYSICIAN ASSISTANT

## 2018-07-24 PROCEDURE — 36415 COLL VENOUS BLD VENIPUNCTURE: CPT | Performed by: PHYSICIAN ASSISTANT

## 2018-07-24 PROCEDURE — 80048 BASIC METABOLIC PNL TOTAL CA: CPT | Performed by: PHYSICIAN ASSISTANT

## 2018-07-24 PROCEDURE — G0378 HOSPITAL OBSERVATION PER HR: HCPCS

## 2018-07-24 PROCEDURE — 25000132 ZZH RX MED GY IP 250 OP 250 PS 637: Mod: GY | Performed by: PHYSICIAN ASSISTANT

## 2018-07-24 PROCEDURE — 99214 OFFICE O/P EST MOD 30 MIN: CPT | Performed by: INTERNAL MEDICINE

## 2018-07-24 RX ADMIN — LORATADINE 10 MG: 10 TABLET ORAL at 08:13

## 2018-07-24 RX ADMIN — DEXTRAN 70 AND HYPROMELLOSE 2910 1 DROP: 1; 3 SOLUTION/ DROPS OPHTHALMIC at 08:16

## 2018-07-24 RX ADMIN — ASPIRIN 325 MG: 325 TABLET, DELAYED RELEASE ORAL at 08:13

## 2018-07-24 RX ADMIN — Medication 1 SPRAY: at 08:15

## 2018-07-24 NOTE — PLAN OF CARE
Problem: Patient Care Overview  Goal: Plan of Care/Patient Progress Review  Outcome: No Change   Observation goals PRIOR TO DISCHARGE     Comments: -diagnostic tests and consults completed and resulted: Goal not met. Plan for EP to see pt in the morning.  -vital signs normal or at patient baseline: Goal met. VSS and orthostatic BPs/HRs stable. Tele A-fib CVR.  -returns to baseline functional status: Goal met. Up with SBA, steady.   -safe disposition plan has been identified: Goal met. Pt will discharge home when medically cleared.  Nurse to notify provider when observation goals have been met and patient is ready for discharge            A/O x4. VSS on RA. Tele A-fib CVR. Denies dizziness/lightheadedness. Up with SBA. NPO at midnight. EP to see in AM for possible pacemaker placement

## 2018-07-24 NOTE — PLAN OF CARE
Problem: Patient Care Overview  Goal: Plan of Care/Patient Progress Review  Outcome: Adequate for Discharge Date Met: 07/24/18  Patient stable and cleared for discharge. Orthostatic B/P's positive this morning, but other VSS on RA. Patient denies dizziness/lightheadedness. Tele normal sinus rhythm. Discharge instructions given to patient and patient verbalized understanding of instructions. All belongings sent with patient. Patient sent home with [facility] monitor and will follow up with Dr. Sanchez on 8/30/2018.

## 2018-07-24 NOTE — PLAN OF CARE
Problem: Patient Care Overview  Goal: Plan of Care/Patient Progress Review  PRIMARY DIAGNOSIS: SYNCOPE  OUTPATIENT/OBSERVATION GOALS TO BE MET BEFORE DISCHARGE:  1. Orthostatic performed: Yes:          Lying Orthostatic BP: 144/84         Sitting Orthostatic BP: 149/84         Standing Orthostatic BP: 121/79     2. Diagnostic testing complete & at baseline neurologic testing: No    3. Cleared by consultants (if involved): No    4. Interpretation of cardiac rhythm per telemetry tech: Normal Sinus Rhythm    5. Tolerating adequate PO diet and medications: Yes    6. Return to near baseline physical activity or neurologic status: Yes    Discharge Planner Nurse   Safe discharge environment identified: Yes  Barriers to discharge: Yes, cardiology consult pending       Entered by: Adrian Morgan 07/24/2018 8:41 AM     Please review provider order for any additional goals.   Nurse to notify provider when observation goals have been met and patient is ready for discharge.

## 2018-07-24 NOTE — CONSULTS
Consult Date:  07/24/2018      CARDIAC ELECTROPHYSIOLOGY CONSULTATION      REQUESTING PHYSICIAN:  Robert Goins PA-C, Hospitalist.      REASON FOR CONSULTATION:  Syncope.      HISTORY OF PRESENT ILLNESS:    It is my pleasure seeing Mr. Corwin Puentes, a delightful 89-year-old gentleman, for syncope.  He has history of hypertension, CVA in 2017 and paroxysmal atrial fibrillation.  He has normal LV function and reported diastolic dysfunction.  He has been followed at the [facility].      The patient was admitted yesterday after a syncopal event while sitting at home, just before breakfast.  His son SHEBA and the patient's wife witnessed the event.  Mr. Puentes was sitting and suddenly slumped over.  He was out for about 5 minutes.  911 was called and the patient was fully awake by the time they arrived.  They recorded probable atrial fibrillation with a somewhat slow ventricular rate between 40 and 70.  His blood pressure was reportedly normal.  There is a mention by the paramedics that his heart rate would drop down to the 30s and 40s and then up to the 60s and 70s.  The only rhythm strip that is available from that encounter has plenty of baseline artifact, but is clearly irregular and shows no severe bradycardia.      The only other episode of syncope that Mr. Puentes has ever had was 4-5 years ago when he was straining having a BM.      Despite his 89 years of age, the patient is fairly active.  He lives in a senior living with his wife.  He has no chest pain with exertion.  No orthopnea, PND or lower extremity edema.        DIAGNOSTIC STUDIES:    Sodium 132, potassium 4.0, creatinine 0.77, hematocrit 35%.      The patient's echocardiogram yesterday showed sinus rhythm with PVCs, overall normal tracing.      The patient's echocardiogram from yesterday showed normal LV size and systolic function with EF of 55% -60%, mild MR, normal estimated right ventricular systolic pressure.        IMPRESSION:    1.  Syncope.  This  "occurred in the sitting position and is suspicious for a cardiac/arrhythmic cause.  However, there is no definite documentation of severe bradycardia.  The patient had been on low-dose metoprolol XL 12.5 mg twice per day for atrial fibrillation.  This has been stopped.  I did perform carotid sinus massage at the bedside today with no significant bradycardia or symptoms elicited.  Seizure-like activity was not described by his family.      Therefore, we have suspicion but no conclusive evidence that bradycardia was the cause of syncope.  I have recommended discharge home today with a 30-day outpatient event monitor.      The other important issue in his cardiac care is anticoagulation.  Given his history of paroxysmal AF Mr. Puentes should be on anticoagulation.  I would favor low-dose apixaban 2.5 mg twice daily.  [id number] score is at least 5.  On the other hand, the timing of anticoagulation initiation is a more difficult clinical desicion given the fainting spell yesterday.  The patient would be at risk for injury should he have a fainting spell while on anticoagulation.  My recommendation is to continue aspirin until we see him in the EP clinic next month.  At that time we plan to readdress anticoagulation.      RECOMMENDATIONS:   A.  Discharge home with a 30-day outpatient event monitor.  Stop Toprol.    B.  Follow-up in the EP Clinic on 08/30/18.   C.  Consider anticoagulation for atrial fibrillation, see discussion above.      Thank you for the opportunity to be part of his care.         REINIER HOUSTON MD, [facility]           Physical Exam:  Vitals: /84 (BP Location: Right arm)  Pulse 96  Temp 96.3  F (35.7  C) (Oral)  Resp 15  Ht 1.727 m (5' 8\")  Wt 59.6 kg (131 lb 8 oz)  SpO2 96%  BMI 19.99 kg/m2     Intake/Output Summary (Last 24 hours) at 07/24/18 1201  Last data filed at 07/24/18 0556   Gross per 24 hour   Intake             1690 ml   Output              875 ml   Net              815 ml "     Vitals:    07/23/18 0922 07/23/18 1137   Weight: 62.6 kg (138 lb) 59.6 kg (131 lb 8 oz)     Constitutional:  A+O x3.  Pt is in NAD.  Well preserved elderly male who appears younger than his stated age.   HEAD: Normocephalic.  SKIN: Skin normal color, texture and turgor with no lesions or eruptions.  Eyes: PERRL, EOMI.  ENT:  Supple, normal JVP. No lymphadenopathy or thyroid enlargement. No carotid bruits.  2+ carotid pulses bilat.  Chest:  CTA bilat, no rales or wheezes.  Cardiac:  RRR, no g/m/r.  Abdomen:  Normal BS.  Soft, non-tender and non-distended.  No rebound or guarding.    Extremities:  Pedious pulses palpable B/L.  No LE edema noticed.   Neurological: Strength and sensation grossly symmetric and intact throughout.         Review of Systems:  Complete review of system is otherwise negative with the exception of what was described above.       CURRENT MEDICATIONS:    aspirin  325 mg Oral Daily     hypromellose-dextran  1 drop Both Eyes BID     loratadine  10 mg Oral Daily     sodium chloride  1 spray Nasal BID     sodium chloride (PF)  3 mL Intracatheter Q8H       ALLERGIES  Allergies   Allergen Reactions     Alendronic Acid      Mold      Peanut-Derived      Pollen Extract      Risedronic Acid [Risedronate]        PAST MEDICAL HISTORY:  Past Medical History:   Diagnosis Date     Atrial fibrillation (H)      Hypertension        PAST SURGICAL HISTORY:  Past Surgical History:   Procedure Laterality Date     ORTHOPEDIC SURGERY      left ankle fusion     ORTHOPEDIC SURGERY      right knee replacement        FAMILY HISTORY:  Reviewed, non-contributory regarding heart disease    SOCIAL HISTORY:  Social History     Social History     Marital status:      Spouse name: N/A     Number of children: N/A     Years of education: N/A     Social History Main Topics     Smoking status: Never Smoker     Smokeless tobacco: Never Used     Alcohol use Yes      Comment: one shot a day      Drug use: No     Sexual  activity: No     Other Topics Concern     None     Social History Narrative         Recent Labs  Lab 18  0550 18  1325 18  0925   WBC 7.1  --  8.7   HGB 12.3*  --  13.1*   MCV 99  --  100     --  207   *  --  130*   POTASSIUM 4.0  --  3.7   CHLORIDE 99  --  96   CO2 27  --  27   BUN 14  --  14   CR 0.77  --  0.80   ANIONGAP 6  --  7   VIRGEN 8.2*  --  8.8   GLC 95  --  113*   TROPI  --  <0.015 <0.015            D: 2018   T: 2018   MT: NTS      Name:     SANTA DAS   MRN:      [id number]        Account:       [id number]   :      1927           Consult Date:  2018      Document: [id number]       cc: BRIAN DONAHUE PA-C

## 2018-07-24 NOTE — PLAN OF CARE
Problem: Patient Care Overview  Goal: Plan of Care/Patient Progress Review  Outcome: Improving  A/O x4. VSS on RA. Tele SR with PACs. Denies dizziness/lightheadedness.  ml/hr. Up with SBA. Reg diet. NPO at midnight. EP to see in AM for possible pacemaker placement.

## 2018-07-24 NOTE — DISCHARGE SUMMARY
Admit Date:     07/23/2018   Discharge Date:           PRIMARY CARE PROVIDER:  Malathi Johnson MD      DATE OF ADMISSION:  07/23/2018       DATE OF DISCHARGE:  07/24/2018      DISCHARGE DIAGNOSES:   1.  Syncope in the setting of atrial fibrillation with slow ventricular response.   1.  Abnormal thyroid function tests.   2.  Chronic hyponatremia.   3.  History of CVA        DISCHARGE MEDICATIONS:       Review of your medicines      CONTINUE these medicines which have NOT CHANGED       Dose / Directions    aspirin 325 MG EC tablet   Used for:  Cerebrovascular accident (CVA) due to stenosis of cerebral artery (H)        Dose:  325 mg   Take 1 tablet (325 mg) by mouth daily   Quantity:  40 tablet   Refills:  1       ATIVAN PO        Dose:  0.25 mg   Take 0.25 mg by mouth daily as needed   Refills:  0       BENADRYL PO        Dose:  25 mg   Take 25 mg by mouth daily as needed   Refills:  0       betamethasone dipropionate 0.05 % cream   Commonly known as:  DIPROSONE        Apply topically 2 times daily as needed   Refills:  0       cholecalciferol 400 units tablet   Commonly known as:  Vitamin D        Dose:  400 Units   Take 400 Units by mouth daily   Refills:  0       hydrocortisone 1 % Crea cream        Apply topically 2 times daily as needed for itching   Refills:  0       LIPITOR PO        Dose:  10 mg   Take 10 mg by mouth daily   Refills:  0       loratadine 10 MG tablet   Commonly known as:  CLARITIN        Dose:  10 mg   Take 10 mg by mouth daily   Refills:  0       metroNIDAZOLE 1 % gel   Commonly known as:  METROGEL        Apply topically daily as needed   Refills:  0       polyethylene glycol 0.4%- propylene glycol 0.3% 0.4-0.3 % Soln ophthalmic solution   Commonly known as:  SYSTANE ULTRA        Dose:  1 drop   Place 1 drop into both eyes 2 times daily   Refills:  0       PRESERVISION AREDS PO        Dose:  1 capsule   Take 1 capsule by mouth 2 times daily   Refills:  0       saline 0.9 % Aers        Dose:   1 spray   Spray 1 spray in nostril 2 times daily   Refills:  0         STOP taking          TOPROL XL PO                    DISCHARGE PAIN PLAN:  - Patient currently has NO PAIN and is not being prescribed pain medications on discharge.       DISPOSITION:  The patient will discharge home on 07/24/2018 with a 30-day event monitor.      FOLLOWUP INSTRUCTIONS:     1.  Follow up with Dr. Daniel vega scheduled on 08/30.   2.  Follow up with primary care provider in 4-6 weeks to repeat thyroid function tests.      PERTINENT LABORATORY DATA:   1.  Troponin less than 0.015.   2.  TSH 7.9.   3.  T4 0.92.   4.  Sodium trend 130, 132.   5.  Hemoglobin trend 13.1, 12.3.      IMAGING:  Echocardiogram 07/23, left ventricular hypertrophy, ejection fraction 55-60%, no wall motion abnormality, mild mitral regurgitation.      PENDING LABORATORY:  None.      PROCEDURES:  None.      CONSULTANTS:  We appreciate the consult from Dr. Dow of cardiology as well as Dr. Sanchez of electrophysiology.      HISTORY OF PRESENT ILLNESS:  Corwin Puentes is an exceptionally pleasant 89-year-old male with past medical history of atrial fibrillation anticoagulated with aspirin, CVA, hypertension and chronic hyponatremia who presented to the emergency department on 07/23/2018 for evaluation of syncope.  He was in his normal state of health sitting for breakfast.  While he was sitting, he had sudden loss of consciousness.  His family who witnessed it stated it was for approximately 5 minutes.  No seizure-like activity.  EMS was summoned and on arrival he was noted to be in atrial fibrillation with heart rates between 30-70 beats per minute.  He was brought to [facility] for further evaluation.  Given his episode of syncope was concern for cardiac etiology, he was admitted for further observation.  For additional details regarding HPI, please see H and P by Robert Goins PA-C.      HOSPITAL COURSE:   1.  Syncope in the setting of atrial  fibrillation with slow ventricular response:  The patient was admitted under observation status.  His prior to admission metoprolol was held.  Heart rates remained relatively stable with no significant severe bradycardia.  Echocardiogram was without significant abnormalities.  Cardiology was consulted as was electrophysiology.  Electrophysiology recommended holding prior to admission Toprol and monitoring on the 30-day event monitor.  He will follow up in 1 month to determine if further treatment or pacemaker is indicated.  With regards to anticoagulation, he has a high CHADS-VASc score of 5.  He has historically only been anticoagulated with full-dose aspirin.  Likely would benefit from anticoagulation with a low dose of apixaban; however, given syncope with unclear etiology, electrophysiology recommends continuing aspirin for now pending further workup.  They can consider initiation of this in the outpatient setting.   2.  Abnormal thyroid function test, likely secondary to sick euthyroid syndrome:  TSH noted to be elevated at 7.9, T4 normal at 0.92.  No previous history of hypothyroidism or thyroid replacement therapy.  No signs and symptoms of hypothyroidism other than possible bradycardia, though this may have been related to a beta blocker.  Would recommend repeat thyroid function test in 4-6 weeks.   3.  Chronic hyponatremia at baseline:  Sodium trend 130-132.      PHYSICAL EXAMINATION:   GENERAL:  Corwin Puentes is an exceptionally pleasant 89-year-old male who is lying in bed in no acute distress.   VITAL SIGNS:  Blood pressure is 124/84, pulse 76, temp 96.3, O2 saturation is 96% on room air.   HEENT:  Head normocephalic, atraumatic.  Oropharynx is midline, posterior pharynx is clear.  Mucous membranes appear moist.   CARDIOVASCULAR:  Irregular, no murmurs appreciated.   PULMONARY:  Normal effort.  Lungs are clear.   ABDOMEN:  Soft, nontender.   EXTREMITIES:  Moves all 4 extremities.   NEUROLOGIC:  He is  alert and oriented.  Cranial nerves II-XII are grossly intact.      Total discharge time greater than 30 minutes.      This patient was discussed with Dr. Hurley of the hospitalist service who independently examined the patient.  She is agreeable to above plan.         VIDYA HURLEY MD       As dictated by JERRI MAN PA-C            D: 2018   T: 2018   MT: KHALIDA      Name:     SANTA DAS   MRN:      [id number]        Account:        [id number]   :      1927           Admit Date:     2018                                  Discharge Date:       Document: [id number]       cc: Malathi Johnson MD

## 2018-07-24 NOTE — PLAN OF CARE
Problem: Patient Care Overview  Goal: Plan of Care/Patient Progress Review  PRIMARY DIAGNOSIS: SYNCOPE/TIA  OUTPATIENT/OBSERVATION GOALS TO BE MET BEFORE DISCHARGE:  1. Orthostatic performed: Yes:          Lying Orthostatic BP: 150/75         Sitting Orthostatic BP: 165/85         Standing Orthostatic BP: 142/78     2. Diagnostic testing complete & at baseline neurologic testing: Yes    3. Cleared by consultants (if involved): No    4. Interpretation of cardiac rhythm per telemetry tech: NSR    5. Tolerating adequate PO diet and medications: Yes    6. Return to near baseline physical activity or neurologic status: Yes    Discharge Planner Nurse   Safe discharge environment identified: Yes  Barriers to discharge: No       Entered by: Audrey Ramey 07/24/2018 4:28 AM     Please review provider order for any additional goals.   Nurse to notify provider when observation goals have been met and patient is ready for discharge.

## 2018-07-24 NOTE — PLAN OF CARE
Problem: Patient Care Overview  Goal: Plan of Care/Patient Progress Review  A&O x4. VSS on RA. Tele NSR overnight. PRN Ativan given for anxiety/sleep. Denies dizziness/lightheadedness. Up with SBA. NPO since midnight. IV SL. EP to see this AM for possible pacemaker placement.

## 2018-07-24 NOTE — PROGRESS NOTES
Dictated.  Syncopal event in the sitting position, concerning for cardiac syncope d/t arrhythmia.  Negative CSM.   However, no definite ECG documentation of severe naga- and the patient is on a BB for AF.    Plan:  - agree with stopping BB  - send home with 30-day event monitor  - will need AC for AF, but would wait till we know more about the etiology of his syncope before prescribing  - f/up with me ([facility]) on 08/30/18 at 4:15 pm

## 2018-07-24 NOTE — PLAN OF CARE
Problem: Patient Care Overview  Goal: Plan of Care/Patient Progress Review  PRIMARY DIAGNOSIS: SYNCOPE/TIA  OUTPATIENT/OBSERVATION GOALS TO BE MET BEFORE DISCHARGE:  1. Orthostatic performed: Yes:          Lying Orthostatic BP: 150/75         Sitting Orthostatic BP: 165/85         Standing Orthostatic BP: 142/78     2. Diagnostic testing complete & at baseline neurologic testing: Yes    3. Cleared by consultants (if involved): No    4. Interpretation of cardiac rhythm per telemetry tech: NSR    5. Tolerating adequate PO diet and medications: Yes    6. Return to near baseline physical activity or neurologic status: Yes    Discharge Planner Nurse   Safe discharge environment identified: Yes  Barriers to discharge: No       Entered by: Audrey Ramey 07/24/2018 12:23 AM     Please review provider order for any additional goals.   Nurse to notify provider when observation goals have been met and patient is ready for discharge.

## 2018-07-25 ENCOUNTER — TELEPHONE (OUTPATIENT)
Dept: FAMILY MEDICINE | Facility: CLINIC | Age: 83
End: 2018-07-25

## 2018-07-25 ENCOUNTER — TELEPHONE (OUTPATIENT)
Dept: CARDIOLOGY | Facility: CLINIC | Age: 83
End: 2018-07-25

## 2018-07-25 ENCOUNTER — DOCUMENTATION ONLY (OUTPATIENT)
Dept: CARDIOLOGY | Facility: CLINIC | Age: 83
End: 2018-07-25

## 2018-07-25 NOTE — PROGRESS NOTES
"Initial [employer] monitor strips - ST/SR with PACs/PVCs.    Per Dr Sanchez' note \" Syncope.  This occurred in the sitting position and is suspicious for a cardiac/arrhythmic cause.  However, there is no definite documentation of severe bradycardia.  The patient had been on low-dose metoprolol XL 12.5 mg twice per day for atrial fibrillation.  This has been stopped.  I did perform carotid sinus massage at the bedside today with no significant bradycardia or symptoms elicited.  Seizure-like activity was not described by his family. Therefore, we have suspicion but no conclusive evidence that bradycardia was the cause of syncope.  I have recommended discharge home today with a 30-day outpatient event monitor. The other important issue in his cardiac care is anticoagulation.  Given his history of paroxysmal AF \"    Folder started. SK  "

## 2018-07-25 NOTE — TELEPHONE ENCOUNTER
ED / Discharge Outreach Protocol    Patient Contact    Attempt # 1    Was call answered?  No.  Unable to leave message.  Home line disconnected.  Cell rang and then received busy signal.  Will attempt call back tomorrow 7/26.  Silva Cash RN  EP Triage

## 2018-07-25 NOTE — TELEPHONE ENCOUNTER
Patient was evaluated by cardiology while inpatient for a syncopal event in the sitting position, concerning for cardiac syncope d/t arrhythmia. Called patient to discuss any post hospital d/c questions he may have, review medication changes, and confirm f/u appts. Patient denied any questions regarding new medications or changes to PTA medications. Patient denied any SOB, chest pain, presyncope or light headedness. RN confirmed with patient that he has an apt scheduled on 8/30/18 with  Dr. Sanchez. Pt was discharged wearing a 30 day event monitor, and reviewed with pt how monitor functions. States was not provided with a patient activator button. Patient advised to call clinic with any cardiac related questions or concerns prior to this vernell't. Patient verbalized understanding and agreed with plan. GERSON Shelton RN.

## 2018-07-25 NOTE — TELEPHONE ENCOUNTER
Pt was discharged from [facility] on 7/24/18 after being treated for syncope. Please call the pt. Thank you.  Stacy Cardona,

## 2018-07-26 ENCOUNTER — TELEPHONE (OUTPATIENT)
Dept: CARDIOLOGY | Facility: CLINIC | Age: 83
End: 2018-07-26

## 2018-07-26 NOTE — TELEPHONE ENCOUNTER
Received call from [employer]. Pt had an auto triggered event of PSVT lasting 32 seconds at a rate of 202 BPM. Will await strip before reviewing with Dr Sanchez and patient (need time of occurrence as well). CHICO Grant  Spoke with patient. He recalls no symptoms with this episode. States he would have been sitting and watching TV. Will continue to monitor for now. CHICO Grant

## 2018-07-26 NOTE — TELEPHONE ENCOUNTER
"ED/Discharge Protocol    \"Hi, my name is Silva Cash, a registered nurse, and I am calling on behalf of Dr. Johnson's office at [place].  I am calling to follow up and see how things are going for you after your recent visit.\"    \"I see that you were in the (ER/UC/IP) on 07/23/18.    How are you doing now that you are home?\" fine.  Wimpy from not eating a couple of days but appetite is back.    Is patient experiencing symptoms that may require a hospital visit?no    Discharge Instructions    \"Let's review your discharge instructions.  What is/are the follow-up recommendations?  Pt. Response: has an appt scheduled with Dr. Sanchez on 8/30/18    \"Were you instructed to make a follow-up appointment?\"  Pt. Response: Yes.  Has appointment been made?   Yes      \"When you see the provider, I would recommend that you bring your discharge instructions with you.    Medications    \"How many new medications are you on since your hospitalization/ED visit?\"    0-1  \"How many of your current medicines changed (dose, timing, name, etc.) while you were in the hospital/ED visit?\"   0-1 stopped metroprolol  \"Do you have questions about your medications?\"   No  \"Were you newly diagnosed with heart failure, COPD, diabetes or did you have a heart attack?\"   No  For patients on insulin: \"Did you start on insulin in the hospital or did you have your insulin dose changed?\"   No    Medication reconciliation completed? Yes    Was MTM referral placed (*Make sure to put transitions as reason for referral)?   No    Call Summary    \"Do you have any questions or concerns about your condition or care plan at the moment?\"    No  Triage nurse advice given: none    Patient was in ER 3 in the past year (assess appropriateness of ER visits.)      \"If you have questions or things don't continue to improve, we encourage you contact us through the main clinic number,  [phone].  Even if the clinic is not open, triage nurses are available 24/7 to help " "you.     We would like you to know that our clinic has extended hours (provide information).  We also have urgent care (provide details on closest location and hours/contact info)\"      \"Thank you for your time and take care!\"        "

## 2018-08-08 NOTE — TELEPHONE ENCOUNTER
Another call from [employer]. Pt had an auto-triggered event of SR/ST with SVT at  bpm. It happened yesterday 8/7/2018 at 3:43pm.  Auto-triggered, so likely no symptoms. [facility] will fax the strip.     OV with Dr. Sanchez scheduled 8/30/2018. Monitor was ordered for syncope, suspecting bradycardia. Pt has history of PAF.     ADDENDUM 3:27pm. Received strip from [facility]. Duration of episode not listed on strip. Attempted to call patient to see if he had symptoms. No answer, left message to call EP RN number.

## 2018-08-13 ENCOUNTER — TELEPHONE (OUTPATIENT)
Dept: CARDIOLOGY | Facility: CLINIC | Age: 83
End: 2018-08-13

## 2018-08-13 NOTE — TELEPHONE ENCOUNTER
After further review, Dr Sanchez recommending pt to resume BB (Metoprolol Suc at previous dose) which was 12.5mg AM and 12.5mg Noon to counteract dizziness after dosing. He will take a dose today and resume as previous tomorrow. SueLangenbrunnerRN

## 2018-08-13 NOTE — TELEPHONE ENCOUNTER
Call received from [facility] with report of SVT, rate 180 bpm. Duration not listed. Tracings viewed shos SR with sudden onset 4 beats of NSVT vs SVT/Aberancy, followed by SVT. Writer spoke with patient as it was auto triggered on 8-11 @ 1852. He denies symptoms.  He is going to [place] on Wed 8-15 for a previously scheduled physical with cardiology. He sees Dr Sanchez here on 8-30-18 and plans to keep that appt. Will review this tracing with Dr Sanchez. Tracing placed on his desk.  SueLangenbrunnerRN

## 2018-08-15 RX ORDER — METOPROLOL SUCCINATE 25 MG/1
TABLET, EXTENDED RELEASE ORAL
Qty: 1 TABLET | Refills: 0 | COMMUNITY
Start: 2018-08-15 | End: 2018-08-16 | Stop reason: DRUGHIGH

## 2018-08-16 ENCOUNTER — TELEPHONE (OUTPATIENT)
Dept: CARDIOLOGY | Facility: CLINIC | Age: 83
End: 2018-08-16

## 2018-08-16 NOTE — TELEPHONE ENCOUNTER
Patient called to report that he has decreased his metoprolol dose from 12.5mg po bid to 12.5mg po every day.  Reports when he was taking the metoprolol twice daily his BP and HR went too low.  Patient tolerating once a day dosing at this time.  BP running 115/75 and HR 62.  Will update med list to reflect what patient is taking.  Patient due to see Dr Sanchez on 8/30 for follow up.  CHICO Sandhu

## 2018-08-30 ENCOUNTER — OFFICE VISIT (OUTPATIENT)
Dept: CARDIOLOGY | Facility: CLINIC | Age: 83
End: 2018-08-30
Payer: MEDICARE

## 2018-08-30 VITALS
BODY MASS INDEX: 19.99 KG/M2 | SYSTOLIC BLOOD PRESSURE: 128 MMHG | WEIGHT: 135 LBS | HEIGHT: 69 IN | DIASTOLIC BLOOD PRESSURE: 82 MMHG | OXYGEN SATURATION: 95 % | HEART RATE: 70 BPM

## 2018-08-30 DIAGNOSIS — I47.10 PAROXYSMAL SUPRAVENTRICULAR TACHYCARDIA (H): Primary | ICD-10-CM

## 2018-08-30 DIAGNOSIS — R55 SYNCOPE, UNSPECIFIED SYNCOPE TYPE: ICD-10-CM

## 2018-08-30 PROCEDURE — 99215 OFFICE O/P EST HI 40 MIN: CPT | Performed by: INTERNAL MEDICINE

## 2018-08-30 RX ORDER — DILTIAZEM HYDROCHLORIDE 180 MG/1
180 CAPSULE, EXTENDED RELEASE ORAL DAILY
Qty: 30 CAPSULE | Refills: 11 | Status: SHIPPED | OUTPATIENT
Start: 2018-08-30 | End: 2018-08-30

## 2018-08-30 RX ORDER — DILTIAZEM HYDROCHLORIDE 180 MG/1
180 CAPSULE, EXTENDED RELEASE ORAL DAILY
Qty: 30 CAPSULE | Refills: 11 | Status: SHIPPED | OUTPATIENT
Start: 2018-08-30

## 2018-08-30 NOTE — PROGRESS NOTES
HPI and Plan:   See dictation    Orders Placed This Encounter   Procedures     Follow-Up with Electrophysiologist     Phuong Patch Monitor       Orders Placed This Encounter   Medications     DISCONTD: diltiazem (DILACOR XR) 180 MG 24 hr capsule     Sig: Take 1 capsule (180 mg) by mouth daily     Dispense:  30 capsule     Refill:  11     diltiazem (DILACOR XR) 180 MG 24 hr capsule     Sig: Take 1 capsule (180 mg) by mouth daily     Dispense:  30 capsule     Refill:  11       Medications Discontinued During This Encounter   Medication Reason     METOPROLOL SUCCINATE ER PO      diltiazem (DILACOR XR) 180 MG 24 hr capsule Reorder         Encounter Diagnosis   Name Primary?     Paroxysmal supraventricular tachycardia (H) Yes       CURRENT MEDICATIONS:  Current Outpatient Prescriptions   Medication Sig Dispense Refill     aspirin  MG EC tablet Take 1 tablet (325 mg) by mouth daily 40 tablet 1     Atorvastatin Calcium (LIPITOR PO) Take 10 mg by mouth daily        betamethasone dipropionate (DIPROSONE) 0.05 % cream Apply topically 2 times daily as needed        cholecalciferol (VITAMIN D) 400 UNITS tablet Take 400 Units by mouth daily       diltiazem (DILACOR XR) 180 MG 24 hr capsule Take 1 capsule (180 mg) by mouth daily 30 capsule 11     DiphenhydrAMINE HCl (BENADRYL PO) Take 25 mg by mouth daily as needed       hydrocortisone 1 % CREA cream Apply topically 2 times daily as needed for itching       loratadine (CLARITIN) 10 MG tablet Take 10 mg by mouth daily       LORazepam (ATIVAN PO) Take 0.25 mg by mouth daily as needed        metroNIDAZOLE (METROGEL) 1 % gel Apply topically daily as needed        Multiple Vitamins-Minerals (PRESERVISION AREDS PO) Take 1 capsule by mouth 2 times daily       polyethylene glycol 0.4%- propylene glycol 0.3% (SYSTANE ULTRA) 0.4-0.3 % SOLN ophthalmic solution Place 1 drop into both eyes 2 times daily       saline 0.9 % AERS Spray 1 spray in nostril 2 times daily        [DISCONTINUED]  diltiazem (DILACOR XR) 180 MG 24 hr capsule Take 1 capsule (180 mg) by mouth daily 30 capsule 11       ALLERGIES     Allergies   Allergen Reactions     Alendronic Acid      Mold      Peanut-Derived      Pollen Extract      Risedronic Acid [Risedronate]        PAST MEDICAL HISTORY:  Past Medical History:   Diagnosis Date     Atrial fibrillation (H)      Hypertension        PAST SURGICAL HISTORY:  Past Surgical History:   Procedure Laterality Date     ORTHOPEDIC SURGERY      left ankle fusion     ORTHOPEDIC SURGERY      right knee replacement        FAMILY HISTORY:  History reviewed. No pertinent family history.    SOCIAL HISTORY:  Social History     Social History     Marital status:      Spouse name: N/A     Number of children: N/A     Years of education: N/A     Social History Main Topics     Smoking status: Never Smoker     Smokeless tobacco: Never Used     Alcohol use Yes      Comment: one shot a day      Drug use: No     Sexual activity: No     Other Topics Concern     None     Social History Narrative       Review of Systems:  Skin:  Negative       Eyes:  Negative      ENT:  Negative      Respiratory:  Negative shortness of breath     Cardiovascular:  Negative;syncope or near-syncope;palpitations;chest pain;edema;fatigue;lightheadedness;dizziness      Gastroenterology: Negative      Genitourinary:  Negative      Musculoskeletal:  Negative      Neurologic:  Negative      Psychiatric:  Negative      Heme/Lymph/Imm:  Negative      Endocrine:  Negative        [id number]

## 2018-08-30 NOTE — PATIENT INSTRUCTIONS
1.  Stop metoprolol.  2.  Start diltiazem 180 mg daily.  3.  Heart monitor in 2 weeks.  4.  See you back in 1 month.

## 2018-08-30 NOTE — MR AVS SNAPSHOT
After Visit Summary   8/30/2018    Corwin Puentes    MRN: [id number]           Patient Information     Date Of Birth          5/27/1927        Visit Information        Provider Department      8/30/2018 4:15 PM Ken Sanchez MD [facility]        Today's Diagnoses     Paroxysmal supraventricular tachycardia (H)    -  1      Care Instructions    1.  Stop metoprolol.  2.  Start diltiazem 180 mg daily.  3.  Heart monitor in 2 weeks.  4.  See you back in 1 month.          Follow-ups after your visit        Additional Services     Follow-Up with Electrophysiologist                 Future tests that were ordered for you today     Open Future Orders        Priority Expected Expires Ordered    Follow-Up with Electrophysiologist Routine 10/3/2018 8/30/2019 8/30/2018    Zio Patch Monitor Routine 9/10/2018 8/30/2019 8/30/2018            Who to contact     If you have questions or need follow up information about today's clinic visit or your schedule please contact [facility] directly at [phone].  Normal or non-critical lab and imaging results will be communicated to you by MyChart, letter or phone within 4 business days after the clinic has received the results. If you do not hear from us within 7 days, please contact the clinic through MyChart or phone. If you have a critical or abnormal lab result, we will notify you by phone as soon as possible.  Submit refill requests through [employer] or call your pharmacy and they will forward the refill request to us. Please allow 3 business days for your refill to be completed.          Additional Information About Your Visit        Care EveryWhere ID     This is your Care EveryWhere ID. This could be used by other organizations to access your [facility] medical records  [id number]        Your Vitals Were     Pulse Height Pulse Oximetry BMI (Body Mass Index)          70 1.753  "m (5' 9\") 95% 19.94 kg/m2         Blood Pressure from Last 3 Encounters:   08/30/18 128/82   07/24/18 144/84   09/27/17 136/80    Weight from Last 3 Encounters:   08/30/18 61.2 kg (135 lb)   07/23/18 59.6 kg (131 lb 8 oz)   09/27/17 62.1 kg (137 lb)                 Today's Medication Changes          These changes are accurate as of 8/30/18  4:49 PM.  If you have any questions, ask your nurse or doctor.               Start taking these medicines.        Dose/Directions    diltiazem 180 MG 24 hr capsule   Commonly known as:  DILACOR XR   Used for:  Paroxysmal supraventricular tachycardia (H)   Started by:  Ken Sanchez MD        Dose:  180 mg   Take 1 capsule (180 mg) by mouth daily   Quantity:  30 capsule   Refills:  11         Stop taking these medicines if you haven't already. Please contact your care team if you have questions.     METOPROLOL SUCCINATE ER PO   Stopped by:  Ken Sanchez MD                Where to get your medicines      Some of these will need a paper prescription and others can be bought over the counter.  Ask your nurse if you have questions.     Bring a paper prescription for each of these medications     diltiazem 180 MG 24 hr capsule                Primary Care Provider Office Phone # Fax #    Malathi Johnson [phone]       [address] [place], [place] [id number]        Equal Access to Services     [facility] HospitalBI[facility] AH: Hadii aad ku hadasho Soomaali, waaxda luqadaha, qaybta kaalmada adeegyada, waxay bela carvajal adedarvin henry . So [facility] [phone].    ATENCIÓN: Si habla español, tiene a kathleen disposición servicios gratuitos de asistencia lingüística. Llame al [phone].    We comply with applicable federal civil rights laws and [place] laws. We do not discriminate on the basis of race, color, national origin, age, disability, sex, sexual orientation, or gender identity.            Thank you!     Thank you for choosing " [facility]  for your care. Our goal is always to provide you with excellent care. Hearing back from our patients is one way we can continue to improve our services. Please take a few minutes to complete the written survey that you may receive in the mail after your visit with us. Thank you!             Your Updated Medication List - Protect others around you: Learn how to safely use, store and throw away your medicines at www.disposemymeds.org.          This list is accurate as of 8/30/18  4:49 PM.  Always use your most recent med list.                   Brand Name Dispense Instructions for use Diagnosis    aspirin 325 MG EC tablet     40 tablet    Take 1 tablet (325 mg) by mouth daily    Cerebrovascular accident (CVA) due to stenosis of cerebral artery (H)       ATIVAN PO      Take 0.25 mg by mouth daily as needed        BENADRYL PO      Take 25 mg by mouth daily as needed        betamethasone dipropionate 0.05 % cream    DIPROSONE     Apply topically 2 times daily as needed        cholecalciferol 400 units tablet    Vitamin D     Take 400 Units by mouth daily        diltiazem 180 MG 24 hr capsule    DILACOR XR    30 capsule    Take 1 capsule (180 mg) by mouth daily    Paroxysmal supraventricular tachycardia (H)       hydrocortisone 1 % Crea cream      Apply topically 2 times daily as needed for itching        LIPITOR PO      Take 10 mg by mouth daily        loratadine 10 MG tablet    CLARITIN     Take 10 mg by mouth daily        metroNIDAZOLE 1 % gel    METROGEL     Apply topically daily as needed        polyethylene glycol 0.4%- propylene glycol 0.3% 0.4-0.3 % Soln ophthalmic solution    SYSTANE ULTRA     Place 1 drop into both eyes 2 times daily        PRESERVISION AREDS PO      Take 1 capsule by mouth 2 times daily        saline 0.9 % Aers      Spray 1 spray in nostril 2 times daily

## 2018-08-30 NOTE — LETTER
8/30/2018    Malathi Johnson MD  [address] [facility] [address]    RE: Corwin Puentes       Dear Colleague,    I had the pleasure of seeing Corwin Puentes in the [facility].    HPI and Plan:   See dictation    Orders Placed This Encounter   Procedures     Follow-Up with Electrophysiologist     Zio Patch Monitor       Orders Placed This Encounter   Medications     DISCONTD: diltiazem (DILACOR XR) 180 MG 24 hr capsule     Sig: Take 1 capsule (180 mg) by mouth daily     Dispense:  30 capsule     Refill:  11     diltiazem (DILACOR XR) 180 MG 24 hr capsule     Sig: Take 1 capsule (180 mg) by mouth daily     Dispense:  30 capsule     Refill:  11       Medications Discontinued During This Encounter   Medication Reason     METOPROLOL SUCCINATE ER PO      diltiazem (DILACOR XR) 180 MG 24 hr capsule Reorder         Encounter Diagnosis   Name Primary?     Paroxysmal supraventricular tachycardia (H) Yes       CURRENT MEDICATIONS:  Current Outpatient Prescriptions   Medication Sig Dispense Refill     aspirin  MG EC tablet Take 1 tablet (325 mg) by mouth daily 40 tablet 1     Atorvastatin Calcium (LIPITOR PO) Take 10 mg by mouth daily        betamethasone dipropionate (DIPROSONE) 0.05 % cream Apply topically 2 times daily as needed        cholecalciferol (VITAMIN D) 400 UNITS tablet Take 400 Units by mouth daily       diltiazem (DILACOR XR) 180 MG 24 hr capsule Take 1 capsule (180 mg) by mouth daily 30 capsule 11     DiphenhydrAMINE HCl (BENADRYL PO) Take 25 mg by mouth daily as needed       hydrocortisone 1 % CREA cream Apply topically 2 times daily as needed for itching       loratadine (CLARITIN) 10 MG tablet Take 10 mg by mouth daily       LORazepam (ATIVAN PO) Take 0.25 mg by mouth daily as needed        metroNIDAZOLE (METROGEL) 1 % gel Apply topically daily as needed        Multiple Vitamins-Minerals (PRESERVISION AREDS PO) Take 1 capsule by mouth 2 times daily        polyethylene glycol 0.4%- propylene glycol 0.3% (SYSTANE ULTRA) 0.4-0.3 % SOLN ophthalmic solution Place 1 drop into both eyes 2 times daily       saline 0.9 % AERS Spray 1 spray in nostril 2 times daily        [DISCONTINUED] diltiazem (DILACOR XR) 180 MG 24 hr capsule Take 1 capsule (180 mg) by mouth daily 30 capsule 11       ALLERGIES     Allergies   Allergen Reactions     Alendronic Acid      Mold      Peanut-Derived      Pollen Extract      Risedronic Acid [Risedronate]        PAST MEDICAL HISTORY:  Past Medical History:   Diagnosis Date     Atrial fibrillation (H)      Hypertension        PAST SURGICAL HISTORY:  Past Surgical History:   Procedure Laterality Date     ORTHOPEDIC SURGERY      left ankle fusion     ORTHOPEDIC SURGERY      right knee replacement        FAMILY HISTORY:  History reviewed. No pertinent family history.    SOCIAL HISTORY:  Social History     Social History     Marital status:      Spouse name: N/A     Number of children: N/A     Years of education: N/A     Social History Main Topics     Smoking status: Never Smoker     Smokeless tobacco: Never Used     Alcohol use Yes      Comment: one shot a day      Drug use: No     Sexual activity: No     Other Topics Concern     None     Social History Narrative       Review of Systems:  Skin:  Negative       Eyes:  Negative      ENT:  Negative      Respiratory:  Negative shortness of breath     Cardiovascular:  Negative;syncope or near-syncope;palpitations;chest pain;edema;fatigue;lightheadedness;dizziness      Gastroenterology: Negative      Genitourinary:  Negative      Musculoskeletal:  Negative      Neurologic:  Negative      Psychiatric:  Negative      Heme/Lymph/Imm:  Negative      Endocrine:  Negative        [id number]            Thank you for allowing me to participate in the care of your patient.      Sincerely,     Ken Sanchez MD     [facility]    cc:   Malathi Johnson MD  [address] [place] [address]  [place] [address]

## 2018-08-30 NOTE — LETTER
"8/30/2018      Malathi Johnson MD  [address] [facility] [address]      RE: Corwin Puentes       Dear Colleague,    I had the pleasure of seeing Corwin Puentes in the [facility].    Service Date: 08/30/2018      HISTORY OF PRESENT ILLNESS:    I had the pleasure of seeing Mr. Corwin Puentes, a delightful 89-year-old male with the following medical issues:   A.  Syncope in the sitting position in 07/2018.  See details below.   B.  Reported history of paroxysmal atrial fibrillation.   C.  Hypertension.   D.  CVA in 2017.      I had met Mr. Puentes during his hospitalization at [facility] last July.  He was brought to the hospital by the paramedics after he had a witnessed syncopal event at home.  His son, RAJAN, and his wife saw him slumping over while sitting.  They said he was out for a few minutes.  911 was called, and by the time the paramedics arrived, he was fully awake.  They recorded \"probable atrial fibrillation\", irregular rate between 40 and 70 beats per minute and normal blood pressure.  The paramedics noted that his heart rate dipped down to the 30s and 40s and then come up to the 60s and 70s.  While in the hospital, we looked for these rhythm strips and did not see proof of severe bradycardia.  I note that the patient had another syncopal episode about 5 years ago while straining having a bowel movement.      During the hospitalization, we did not see significant arrhythmias on the monitor.  We asked the patient to stop his wwvlg-dt-mwnrhvyrw metoprolol XL 25 mg daily and hooked up a 30-day cardiac event monitor.      On his cardiac event monitor, the patient had multiple episodes of SVT with rates ranging anywhere from 150 to 200 beats per minute.  Most exhibited a narrow QRS, but there were episodes of wide-QRS tachycardia as well.  The rate was as fast as 220 beats per minute.  Longest episode was less than 30 seconds.  Interestingly, the patient was " unaware of all of these events.  We asked him to restart the Toprol-XL 25 mg daily, but the patient said that he only went back on half that dose (12.50 mg daily).      He has not had syncope or palpitation in the past month.  He has been feeling well.  He came in the clinic today accompanied by his spouse and his son, DEVON.      PHYSICAL EXAMINATION:   VITAL SIGNS:  Blood pressure 128/82, pulse 70 and regular, weight 61 kg, height 175 cm.   GENERAL:  This is a quite delightful and very alert, elderly man in no apparent distress.   HEAD:  Normocephalic.   NECK:  Supple, without apparent bruits.   LUNGS:  Clear.   CARDIOVASCULAR:  Regular rhythm in the 70s.  No gallop, murmur or rub.   ABDOMEN:  Soft, nontender.   EXTREMITIES:  No edema.      DIAGNOSTIC STUDIES:  I reviewed his cardiac monitor today, results as per HPI.        IMPRESSION:   1.  Recent episode of syncope in the sitting position.   2.  Multiple episodes of nonsustained SVT (both with narrow QRS and wide QRS complexes) were recorded on his recent cardiac event monitor.      When I saw Mr. Puentes at the hospital, my clinical suspicion was that he was having intermittent bradycardia leading to syncope.  Therefore, we stopped the metoprolol.  It turns out that the patient has the opposite problem, paroxysmal SVT.  Several different rates between 150 and 220 bpm were recorded on his recent heart monitor.  We did not see severe bradycardia.  Such fast SVT can certainly cause syncope.  It is unclear if there is one or more mechanisms of SVT, but several episodes are very fast and sometimes conducting with aberrancy.     I am suspicious that SVT is the cause of the patient's syncope.  I discussed with Mr. Puentes and his family the following options:   A.  Pharmacologic therapy.   B.  Catheter ablation.      Given his age of 89, my inclination is to start with more conservative options before committing him to catheter ablation.  He was in agreement.  We then  "discussed pharmacologic therapy using \"gentler\" agents, such as diltiazem, or stronger medications, such as amiodarone.  I explained that amiodarone is likely to eliminate episodes of SVT but has several potential side-effects, especially in this super elderly age group.      We mutually agreed to start with a trial of a \"gentle medication.\"      RECOMMENDATIONS:   A.  Stop Toprol-XL.   B.  Start diltiazem  mg daily.  Possible side-effects were discussed.   C.  A 14-day Zio Patch monitor will be hooked up 10-14 days after he starts diltiazem.   D.  I will see him in the clinic after the monitor results become available.  I encouraged the patient to call sooner should he have issues in the interim.   E.  While in the hospital, there was concern that the patient may have atrial fibrillation.  So far, we have not documented atrial fibrillation and at this point there is no compelling argument to initiate anticoagulation.      I do appreciate the opportunity to be part of his care.    Time spent 40 minutes, greater than 50 percent of the time was discussion.        REINIER HOUSTON MD, [facility]          cc:   [place] [address]          D: 2018   T: 2018   MT: ROSITA      Name:     SANTA DAS   MRN:      [id number]        Account:      [id number]   :      1927           Service Date: 2018      Document: [id number]           Outpatient Encounter Prescriptions as of 2018   Medication Sig Dispense Refill     aspirin  MG EC tablet Take 1 tablet (325 mg) by mouth daily 40 tablet 1     Atorvastatin Calcium (LIPITOR PO) Take 10 mg by mouth daily        betamethasone dipropionate (DIPROSONE) 0.05 % cream Apply topically 2 times daily as needed        cholecalciferol (VITAMIN D) 400 UNITS tablet Take 400 Units by mouth daily       diltiazem (DILACOR XR) 180 MG 24 hr capsule Take 1 capsule (180 mg) by mouth " daily 30 capsule 11     DiphenhydrAMINE HCl (BENADRYL PO) Take 25 mg by mouth daily as needed       hydrocortisone 1 % CREA cream Apply topically 2 times daily as needed for itching       loratadine (CLARITIN) 10 MG tablet Take 10 mg by mouth daily       LORazepam (ATIVAN PO) Take 0.25 mg by mouth daily as needed        metroNIDAZOLE (METROGEL) 1 % gel Apply topically daily as needed        Multiple Vitamins-Minerals (PRESERVISION AREDS PO) Take 1 capsule by mouth 2 times daily       polyethylene glycol 0.4%- propylene glycol 0.3% (SYSTANE ULTRA) 0.4-0.3 % SOLN ophthalmic solution Place 1 drop into both eyes 2 times daily       saline 0.9 % AERS Spray 1 spray in nostril 2 times daily        [DISCONTINUED] diltiazem (DILACOR XR) 180 MG 24 hr capsule Take 1 capsule (180 mg) by mouth daily 30 capsule 11     [DISCONTINUED] METOPROLOL SUCCINATE ER PO Take 12.5 mg by mouth daily       No facility-administered encounter medications on file as of 8/30/2018.        Again, thank you for allowing me to participate in the care of your patient.      Sincerely,    Ken Sanchez MD     [facility]

## 2018-08-31 NOTE — PROGRESS NOTES
"Service Date: 08/30/2018      HISTORY OF PRESENT ILLNESS:    I had the pleasure of seeing Mr. Corwin Puentes, a delightful 89-year-old male with the following medical issues:   A.  Syncope in the sitting position in 07/2018.  See details below.   B.  Reported history of paroxysmal atrial fibrillation.   C.  Hypertension.   D.  CVA in 2017.      I had met Mr. Puentes during his hospitalization at [facility] last July.  He was brought to the hospital by the paramedics after he had a witnessed syncopal event at home.  His son, RAJAN, and his wife saw him slumping over while sitting.  They said he was out for a few minutes.  911 was called, and by the time the paramedics arrived, he was fully awake.  They recorded \"probable atrial fibrillation\", irregular rate between 40 and 70 beats per minute and normal blood pressure.  The paramedics noted that his heart rate dipped down to the 30s and 40s and then come up to the 60s and 70s.  While in the hospital, we looked for these rhythm strips and did not see proof of severe bradycardia.  I note that the patient had another syncopal episode about 5 years ago while straining having a bowel movement.      During the hospitalization, we did not see significant arrhythmias on the monitor.  We asked the patient to stop his elhyi-ex-yzvxaiwhx metoprolol XL 25 mg daily and hooked up a 30-day cardiac event monitor.      On his cardiac event monitor, the patient had multiple episodes of SVT with rates ranging anywhere from 150 to 200 beats per minute.  Most exhibited a narrow QRS, but there were episodes of wide-QRS tachycardia as well.  The rate was as fast as 220 beats per minute.  Longest episode was less than 30 seconds.  Interestingly, the patient was unaware of all of these events.  We asked him to restart the Toprol-XL 25 mg daily, but the patient said that he only went back on half that dose (12.50 mg daily).      He has not had syncope or palpitation in the past month.  He has been " "feeling well.  He came in the clinic today accompanied by his spouse and his son, DEVON.      PHYSICAL EXAMINATION:   VITAL SIGNS:  Blood pressure 128/82, pulse 70 and regular, weight 61 kg, height 175 cm.   GENERAL:  This is a quite delightful and very alert, elderly man in no apparent distress.   HEAD:  Normocephalic.   NECK:  Supple, without apparent bruits.   LUNGS:  Clear.   CARDIOVASCULAR:  Regular rhythm in the 70s.  No gallop, murmur or rub.   ABDOMEN:  Soft, nontender.   EXTREMITIES:  No edema.      DIAGNOSTIC STUDIES:  I reviewed his cardiac monitor today, results as per HPI.        IMPRESSION:   1.  Recent episode of syncope in the sitting position.   2.  Multiple episodes of nonsustained SVT (both with narrow QRS and wide QRS complexes) were recorded on his recent cardiac event monitor.      When I saw Mr. Puentes at the hospital, my clinical suspicion was that he was having intermittent bradycardia leading to syncope.  Therefore, we stopped the metoprolol.  It turns out that the patient has the opposite problem, paroxysmal SVT.  Several different rates between 150 and 220 bpm were recorded on his recent heart monitor.  We did not see severe bradycardia.  Such fast SVT can certainly cause syncope.  It is unclear if there is one or more mechanisms of SVT, but several episodes are very fast and sometimes conducting with aberrancy.     I am suspicious that SVT is the cause of the patient's syncope.  I discussed with Mr. Puentes and his family the following options:   A.  Pharmacologic therapy.   B.  Catheter ablation.      Given his age of 89, my inclination is to start with more conservative options before committing him to catheter ablation.  He was in agreement.  We then discussed pharmacologic therapy using \"gentler\" agents, such as diltiazem, or stronger medications, such as amiodarone.  I explained that amiodarone is likely to eliminate episodes of SVT but has several potential side-effects, especially in " "this super elderly age group.      We mutually agreed to start with a trial of a \"gentle medication.\"      RECOMMENDATIONS:   A.  Stop Toprol-XL.   B.  Start diltiazem  mg daily.  Possible side-effects were discussed.   C.  A 14-day Zio Patch monitor will be hooked up 10-14 days after he starts diltiazem.   D.  I will see him in the clinic after the monitor results become available.  I encouraged the patient to call sooner should he have issues in the interim.   E.  While in the hospital, there was concern that the patient may have atrial fibrillation.  So far, we have not documented atrial fibrillation and at this point there is no compelling argument to initiate anticoagulation.      I do appreciate the opportunity to be part of his care.    Time spent 40 minutes, greater than 50 percent of the time was discussion.        REINIER HOUSTON MD, [facility]          cc:   [place] [address]          D: 2018   T: 2018   MT: ROSITA      Name:     SANTA DAS   MRN:      [id number]        Account:      [id number]   :      1927           Service Date: 2018      Document: [id number]      "

## 2018-09-04 ENCOUNTER — TELEPHONE (OUTPATIENT)
Dept: CARDIOLOGY | Facility: CLINIC | Age: 83
End: 2018-09-04

## 2018-09-04 NOTE — TELEPHONE ENCOUNTER
Pt requesting records to be sent to Cardiologist at [place] [place] [address] Abilio Nielsen.  Did explain that records can be assessed through Care Everywhere, but will ask that HIM send pt last Cardionet and OV with Dr Sanchez. Paras

## 2018-09-11 ENCOUNTER — HOSPITAL ENCOUNTER (OUTPATIENT)
Dept: CARDIOLOGY | Facility: CLINIC | Age: 83
Discharge: HOME | End: 2018-09-11
Attending: INTERNAL MEDICINE | Admitting: INTERNAL MEDICINE
Payer: MEDICARE

## 2018-09-11 DIAGNOSIS — I47.10 PAROXYSMAL SUPRAVENTRICULAR TACHYCARDIA (H): ICD-10-CM

## 2018-09-11 PROCEDURE — 0298T ZZC EXT ECG > 48HR TO 21 DAY REVIEW AND INTERPRETATN: CPT | Performed by: INTERNAL MEDICINE

## 2018-09-11 PROCEDURE — 0296T ZIO PATCH HOLTER: CPT

## 2018-10-03 ENCOUNTER — TELEPHONE (OUTPATIENT)
Dept: CARDIOLOGY | Facility: CLINIC | Age: 83
End: 2018-10-03

## 2018-10-03 NOTE — TELEPHONE ENCOUNTER
Pt called and LM with questions about his monitor. Pt had a 14 day ZP placed on 9/11, thus this should have been removed on 9/25 and mailed  Back.  LM for pt to call back to discuss. Paras

## 2018-10-03 NOTE — TELEPHONE ENCOUNTER
Pt returned call and it was discussed that pt was to have taken monitor off on 9/25, so at this time there is not enough time to get results for OV on 10/9 with Dr Sanchez. Date changed to 10/30 at 1345, but pt reminded that if there is something that needs to be done regarding to pt monitor results, that he will get a call.  Pt stated understanding. Will also keep an eye out for an earlier OV if possible. [email or web address]

## 2018-10-12 NOTE — TELEPHONE ENCOUNTER
Pt calling to make sure that monitor has arrived at clinic for OV with Dr Sanchez.  Made pt aware that report arrived and has been signed by Dr Sanchez and sent to be scanned into pt chart. Pt states understanding and no further questions . Paras

## 2018-10-15 ENCOUNTER — DOCUMENTATION ONLY (OUTPATIENT)
Dept: CARDIOLOGY | Facility: CLINIC | Age: 83
End: 2018-10-15

## 2018-10-15 NOTE — PROGRESS NOTES
Ken Sanchez MD  [facility] Nurse                     Improvement in the duration of SVT on dilt XR.  Also had run of VT.   Please call him.   LILIBETH           Left message asking patient to return call for zio results. SK

## 2018-10-30 ENCOUNTER — OFFICE VISIT (OUTPATIENT)
Dept: CARDIOLOGY | Facility: CLINIC | Age: 83
End: 2018-10-30
Attending: INTERNAL MEDICINE
Payer: MEDICARE

## 2018-10-30 VITALS
DIASTOLIC BLOOD PRESSURE: 70 MMHG | HEIGHT: 69 IN | WEIGHT: 135 LBS | SYSTOLIC BLOOD PRESSURE: 144 MMHG | HEART RATE: 88 BPM | BODY MASS INDEX: 19.99 KG/M2

## 2018-10-30 DIAGNOSIS — R55 SYNCOPE, UNSPECIFIED SYNCOPE TYPE: Primary | ICD-10-CM

## 2018-10-30 DIAGNOSIS — I47.10 PAROXYSMAL SUPRAVENTRICULAR TACHYCARDIA (H): ICD-10-CM

## 2018-10-30 PROCEDURE — 99213 OFFICE O/P EST LOW 20 MIN: CPT | Performed by: INTERNAL MEDICINE

## 2018-10-30 NOTE — LETTER
10/30/2018    [place] MD Florina  [address] [facility] [address]    RE: Corwin Puentes       Dear Colleague,    I had the pleasure of seeing Corwin Puentes in the [facility].    HPI and Plan:   See dictation    Orders Placed This Encounter   Procedures     Follow-Up with Cardiac Advanced Practice Provider     EKG 12-lead complete w/read - Clinics (to be scheduled)       No orders of the defined types were placed in this encounter.      There are no discontinued medications.      Encounter Diagnosis   Name Primary?     Paroxysmal supraventricular tachycardia (H)        CURRENT MEDICATIONS:  Current Outpatient Prescriptions   Medication Sig Dispense Refill     aspirin  MG EC tablet Take 1 tablet (325 mg) by mouth daily 40 tablet 1     Atorvastatin Calcium (LIPITOR PO) Take 10 mg by mouth daily        betamethasone dipropionate (DIPROSONE) 0.05 % cream Apply topically 2 times daily as needed        cholecalciferol (VITAMIN D) 400 UNITS tablet Take 400 Units by mouth daily       diltiazem (DILACOR XR) 180 MG 24 hr capsule Take 1 capsule (180 mg) by mouth daily 30 capsule 11     DiphenhydrAMINE HCl (BENADRYL PO) Take 25 mg by mouth daily as needed       hydrocortisone 1 % CREA cream Apply topically 2 times daily as needed for itching       loratadine (CLARITIN) 10 MG tablet Take 10 mg by mouth daily       LORazepam (ATIVAN PO) Take 0.25 mg by mouth daily as needed        metroNIDAZOLE (METROGEL) 1 % gel Apply topically daily as needed        Multiple Vitamins-Minerals (PRESERVISION AREDS PO) Take 1 capsule by mouth 2 times daily       polyethylene glycol 0.4%- propylene glycol 0.3% (SYSTANE ULTRA) 0.4-0.3 % SOLN ophthalmic solution Place 1 drop into both eyes 2 times daily       saline 0.9 % AERS Spray 1 spray in nostril 2 times daily          ALLERGIES     Allergies   Allergen Reactions     Alendronic Acid      Mold      Peanut-Derived      Pollen Extract      Risedronic  Acid [Risedronate]        PAST MEDICAL HISTORY:  Past Medical History:   Diagnosis Date     Atrial fibrillation (H)      Hypertension        PAST SURGICAL HISTORY:  Past Surgical History:   Procedure Laterality Date     ORTHOPEDIC SURGERY      left ankle fusion     ORTHOPEDIC SURGERY      right knee replacement        FAMILY HISTORY:  No family history on file.    SOCIAL HISTORY:  Social History     Social History     Marital status:      Spouse name: N/A     Number of children: N/A     Years of education: N/A     Social History Main Topics     Smoking status: Never Smoker     Smokeless tobacco: Never Used     Alcohol use Yes      Comment: one shot a day      Drug use: No     Sexual activity: No     Other Topics Concern     None     Social History Narrative       Review of Systems:  Skin:  Negative       Eyes:  Negative      ENT:  Negative      Respiratory:  Negative       Cardiovascular:  Negative      Gastroenterology: Negative      Genitourinary:  Negative      Musculoskeletal:  Negative      Neurologic:  Negative      Psychiatric:  Negative      Heme/Lymph/Imm:  Negative      Endocrine:  Negative        [id number]            Thank you for allowing me to participate in the care of your patient.      Sincerely,     Ken Sanchez MD     [facility]    cc:   Ken Sanchez MD  [address] [facility] [address]  [place] [address]

## 2018-10-30 NOTE — PROGRESS NOTES
HPI and Plan:   See dictation    Orders Placed This Encounter   Procedures     Follow-Up with Cardiac Advanced Practice Provider     EKG 12-lead complete w/read - Clinics (to be scheduled)       No orders of the defined types were placed in this encounter.      There are no discontinued medications.      Encounter Diagnosis   Name Primary?     Paroxysmal supraventricular tachycardia (H)        CURRENT MEDICATIONS:  Current Outpatient Prescriptions   Medication Sig Dispense Refill     aspirin  MG EC tablet Take 1 tablet (325 mg) by mouth daily 40 tablet 1     Atorvastatin Calcium (LIPITOR PO) Take 10 mg by mouth daily        betamethasone dipropionate (DIPROSONE) 0.05 % cream Apply topically 2 times daily as needed        cholecalciferol (VITAMIN D) 400 UNITS tablet Take 400 Units by mouth daily       diltiazem (DILACOR XR) 180 MG 24 hr capsule Take 1 capsule (180 mg) by mouth daily 30 capsule 11     DiphenhydrAMINE HCl (BENADRYL PO) Take 25 mg by mouth daily as needed       hydrocortisone 1 % CREA cream Apply topically 2 times daily as needed for itching       loratadine (CLARITIN) 10 MG tablet Take 10 mg by mouth daily       LORazepam (ATIVAN PO) Take 0.25 mg by mouth daily as needed        metroNIDAZOLE (METROGEL) 1 % gel Apply topically daily as needed        Multiple Vitamins-Minerals (PRESERVISION AREDS PO) Take 1 capsule by mouth 2 times daily       polyethylene glycol 0.4%- propylene glycol 0.3% (SYSTANE ULTRA) 0.4-0.3 % SOLN ophthalmic solution Place 1 drop into both eyes 2 times daily       saline 0.9 % AERS Spray 1 spray in nostril 2 times daily          ALLERGIES     Allergies   Allergen Reactions     Alendronic Acid      Mold      Peanut-Derived      Pollen Extract      Risedronic Acid [Risedronate]        PAST MEDICAL HISTORY:  Past Medical History:   Diagnosis Date     Atrial fibrillation (H)      Hypertension        PAST SURGICAL HISTORY:  Past Surgical History:   Procedure Laterality Date      ORTHOPEDIC SURGERY      left ankle fusion     ORTHOPEDIC SURGERY      right knee replacement        FAMILY HISTORY:  No family history on file.    SOCIAL HISTORY:  Social History     Social History     Marital status:      Spouse name: N/A     Number of children: N/A     Years of education: N/A     Social History Main Topics     Smoking status: Never Smoker     Smokeless tobacco: Never Used     Alcohol use Yes      Comment: one shot a day      Drug use: No     Sexual activity: No     Other Topics Concern     None     Social History Narrative       Review of Systems:  Skin:  Negative       Eyes:  Negative      ENT:  Negative      Respiratory:  Negative       Cardiovascular:  Negative      Gastroenterology: Negative      Genitourinary:  Negative      Musculoskeletal:  Negative      Neurologic:  Negative      Psychiatric:  Negative      Heme/Lymph/Imm:  Negative      Endocrine:  Negative        [id number]

## 2018-10-30 NOTE — LETTER
"10/30/2018      [facility], [place]  [address] [facility] [address]      RE: Corwin Puentes       Dear Colleague,    I had the pleasure of seeing Corwin Puentes in the [facility].    Service Date: 10/30/2018      HISTORY OF PRESENT ILLNESS:  I had the pleasure of seeing Mr. Corwin Puentes, a delightful 89-year-old male with the following medical issues:   A.  Syncope in the sitting position in 07/2018.  See details in my note from 08/2018.   B.  Reported history of paroxysmal atrial fibrillation.   C.  Hypertension.   D.  CVA in 2017.      When I last saw Mr. Puentes in late August, I recommended a trial of diltiazem  mg daily for suppression of very fast SVT episodes (rates reaching 240 beats per minute).  Other treatments were considered, including amiodarone and catheter ablation.  However, we mutually agreed to try a \"gentler\" pharmacologic treatment before proceeding with more aggressive therapies given his advanced age.      Mr. Puentes has tolerated diltiazem CD with the exception of some mild aggravation of chronic constipation.  He has not had further episodes of dizziness, sudden onset lightheadedness or syncope.  He is feeling quite well at the moment.      PHYSICAL EXAMINATION:   VITAL SIGNS:  Blood pressure 144/70, pulse 88 and regular, weight 61 kg, height 175 cm.   GENERAL:  He is a very kind and pleasant elderly male who provides good history.  He is accompanied by his son, RAJAN, and his wife.   HEENT:  Head normocephalic.   LUNGS:  Clear.   CARDIOVASCULAR:  Regular rhythm, no apparent gallop or murmur.      DIAGNOSTIC STUDIES:  I reviewed his recent 14-day Zio Patch monitor (this was performed while on diltiazem).  It showed 63 episodes of SVT, longest of about 12 seconds with rates mostly in 140s to 160s, sometimes faster.  She also had 3 runs of wide QRS tachycardia, the longest of 7 seconds at a rate of approximately 200 beats per minute.      IMPRESSION: "   1.  Single syncopal event in 2018.  The most likely explanation of this event was tachycardia (the patient has both narrow and wide QRS tachycardias).  There has been an improvement in both the frequency and duration of SVT episodes on diltiazem, though certainly not complete suppression.  Because the patient is clinically doing well and he is already having mild side effects (constipation) on the current dose of medication, I will leave his treatment as is.      I encouraged him to call if he has further episodes of sudden onset dizziness or, of course, syncope.  In that case, I might reevaluate with another heart monitor and consider switching diltiazem to amiodarone.      I have requested a followup visit in 1 year.      cc:   [place] [address]         REINIER HOUSTON MD             D: 10/30/2018   T: 10/30/2018   MT: ROSITA      Name:     SANTA DAS   MRN:      [id number]        Account:      [id number]   :      1927           Service Date: 10/30/2018      Document: [id number]         Outpatient Encounter Prescriptions as of 10/30/2018   Medication Sig Dispense Refill     aspirin  MG EC tablet Take 1 tablet (325 mg) by mouth daily 40 tablet 1     Atorvastatin Calcium (LIPITOR PO) Take 10 mg by mouth daily        betamethasone dipropionate (DIPROSONE) 0.05 % cream Apply topically 2 times daily as needed        cholecalciferol (VITAMIN D) 400 UNITS tablet Take 400 Units by mouth daily       diltiazem (DILACOR XR) 180 MG 24 hr capsule Take 1 capsule (180 mg) by mouth daily 30 capsule 11     DiphenhydrAMINE HCl (BENADRYL PO) Take 25 mg by mouth daily as needed       hydrocortisone 1 % CREA cream Apply topically 2 times daily as needed for itching       loratadine (CLARITIN) 10 MG tablet Take 10 mg by mouth daily       LORazepam (ATIVAN PO) Take 0.25 mg by mouth daily as needed        metroNIDAZOLE  (METROGEL) 1 % gel Apply topically daily as needed        Multiple Vitamins-Minerals (PRESERVISION AREDS PO) Take 1 capsule by mouth 2 times daily       polyethylene glycol 0.4%- propylene glycol 0.3% (SYSTANE ULTRA) 0.4-0.3 % SOLN ophthalmic solution Place 1 drop into both eyes 2 times daily       saline 0.9 % AERS Spray 1 spray in nostril 2 times daily        No facility-administered encounter medications on file as of 10/30/2018.        Again, thank you for allowing me to participate in the care of your patient.      Sincerely,    Ken Sanchez MD     [facility]

## 2018-10-30 NOTE — MR AVS SNAPSHOT
"              After Visit Summary   10/30/2018    Corwin Puentes    MRN: [id number]           Patient Information     Date Of Birth          5/27/1927        Visit Information        Provider Department      10/30/2018 1:45 PM Ken Sanchez MD [facility]        Today's Diagnoses     Syncope, unspecified syncope type    -  1    Paroxysmal supraventricular tachycardia (H)           Follow-ups after your visit        Additional Services     Follow-Up with Cardiac Advanced Practice Provider                 Future tests that were ordered for you today     Open Future Orders        Priority Expected Expires Ordered    EKG 12-lead complete w/read - Clinics (to be scheduled) Routine 10/30/2019 10/31/2019 10/30/2018    Follow-Up with Cardiac Advanced Practice Provider Routine 10/30/2019 10/31/2019 10/30/2018            Who to contact     If you have questions or need follow up information about today's clinic visit or your schedule please contact [facility] directly at [phone].  Normal or non-critical lab and imaging results will be communicated to you by MyChart, letter or phone within 4 business days after the clinic has received the results. If you do not hear from us within 7 days, please contact the clinic through [employer] or phone. If you have a critical or abnormal lab result, we will notify you by phone as soon as possible.  Submit refill requests through [employer] or call your pharmacy and they will forward the refill request to us. Please allow 3 business days for your refill to be completed.          Additional Information About Your Visit        Care EveryWhere ID     This is your Care EveryWhere ID. This could be used by other organizations to access your [place] medical records  [id number]        Your Vitals Were     Pulse Height BMI (Body Mass Index)             88 1.753 m (5' 9\") 19.94 kg/m2          Blood " Pressure from Last 3 Encounters:   10/30/18 144/70   08/30/18 128/82   07/24/18 144/84    Weight from Last 3 Encounters:   10/30/18 61.2 kg (135 lb)   08/30/18 61.2 kg (135 lb)   07/23/18 59.6 kg (131 lb 8 oz)              We Performed the Following     Follow-Up with Electrophysiologist        Primary Care Provider Office Phone # Fax #    Hernandez Heaton [phone]       [address] [place] [address]        Equal Access to Services     [facility]: Hadii mel hugheso Sodelvin, waaxda luqadaha, qaybta kaalmamary hutchins, arlene wilkinson. So [facility] [phone].    ATENCIÓN: Si habla español, tiene a kathleen disposición servicios gratuitos de asistencia lingüística. [facility] [phone].    We comply with applicable federal civil rights laws and [place] laws. We do not discriminate on the basis of race, color, national origin, age, disability, sex, sexual orientation, or gender identity.            Thank you!     Thank you for choosing [facility]  for your care. Our goal is always to provide you with excellent care. Hearing back from our patients is one way we can continue to improve our services. Please take a few minutes to complete the written survey that you may receive in the mail after your visit with us. Thank you!             Your Updated Medication List - Protect others around you: Learn how to safely use, store and throw away your medicines at www.disposemymeds.org.          This list is accurate as of 10/30/18  2:53 PM.  Always use your most recent med list.                   Brand Name Dispense Instructions for use Diagnosis    aspirin 325 MG EC tablet     40 tablet    Take 1 tablet (325 mg) by mouth daily    Cerebrovascular accident (CVA) due to stenosis of cerebral artery (H)       ATIVAN PO      Take 0.25 mg by mouth daily as needed        BENADRYL PO      Take 25 mg by mouth daily as needed        betamethasone  dipropionate 0.05 % cream    DIPROSONE     Apply topically 2 times daily as needed        cholecalciferol 400 units tablet    Vitamin D     Take 400 Units by mouth daily        diltiazem 180 MG 24 hr capsule    DILACOR XR    30 capsule    Take 1 capsule (180 mg) by mouth daily    Paroxysmal supraventricular tachycardia (H)       hydrocortisone 1 % Crea cream      Apply topically 2 times daily as needed for itching        LIPITOR PO      Take 10 mg by mouth daily        loratadine 10 MG tablet    CLARITIN     Take 10 mg by mouth daily        metroNIDAZOLE 1 % gel    METROGEL     Apply topically daily as needed        polyethylene glycol 0.4%- propylene glycol 0.3% 0.4-0.3 % Soln ophthalmic solution    SYSTANE ULTRA     Place 1 drop into both eyes 2 times daily        PRESERVISION AREDS PO      Take 1 capsule by mouth 2 times daily        saline 0.9 % Aers      Spray 1 spray in nostril 2 times daily

## 2018-10-31 NOTE — PROGRESS NOTES
"Service Date: 10/30/2018      HISTORY OF PRESENT ILLNESS:    I had the pleasure of seeing Mr. Corwin Puentes, a delightful 89-year-old male with the following medical issues:   A.  Syncope in the sitting position in 07/2018.  See details in my note from 08/2018.   B.  Reported history of paroxysmal atrial fibrillation.   C.  Hypertension.   D.  CVA in 2017.      When I last saw Mr. Puentes in late August, I recommended a trial of diltiazem  mg daily for suppression of his very fast SVT episodes (rates reaching 240 beats per minute).  Other treatments were considered, including amiodarone and catheter ablation.  However, we mutually agreed to try a \"gentler\" pharmacologic treatment before proceeding with more aggressive therapies (given his advanced age).      Mr. Puentes has tolerated diltiazem CD with the exception of some mild aggravation of chronic constipation.  He has not had further episodes of dizziness, sudden onset lightheadedness or syncope.  He is feeling quite well at the moment.      PHYSICAL EXAMINATION:   VITAL SIGNS:  Blood pressure 144/70, pulse 88 and regular, weight 61 kg, height 175 cm.   GENERAL:  He is a very kind and pleasant elderly male who provides good history.  He is accompanied by his son, RAJAN, and his wife.   HEENT:  Head normocephalic.   LUNGS:  Clear.   CARDIOVASCULAR:  Regular rhythm, no apparent gallop or murmur.      DIAGNOSTIC STUDIES:    I reviewed his recent 14-day Zio Patch monitor (this was performed while on diltiazem).  It showed 63 episodes of SVT, longest of 12 seconds with rates typically in 140s to 160s.  He also had 3 runs of wide QRS tachycardia, longest of 7 seconds at approximately 200 beats per minute.      IMPRESSION & PLAN:   1.  Single syncopal event in 07/2018.  The most likely explanation of this event was tachycardia (the patient has both narrow and wide QRS tachycardias).  There has been an improvement in both the frequency and duration of SVT episodes on " diltiazem, though certainly not complete suppression.  Because the patient is clinically doing well and is already having side-effects (constipation) on the current dose of diltiazem, I will leave his treatment as is.      I encouraged him to call if he has further episodes of sudden onset dizziness or, of course, syncope.  In that case, I might reevaluate with another heart monitor and consider switching diltiazem to amiodarone.      I have requested a follow-up visit in 1 year.         REINIER HOUSTON MD, FACC         cc:   Malathi Johnson MD    [address]  [address]          D: 10/30/2018   T: 10/30/2018   MT: ROSITA      Name:     SANTA DAS   MRN:      [id number]        Account:      [id number]   :      1927           Service Date: 10/30/2018      Document: [id number]

## 2019-03-01 ENCOUNTER — APPOINTMENT (RX ONLY)
Dept: URBAN - METROPOLITAN AREA CLINIC 116 | Facility: CLINIC | Age: 84
Setting detail: DERMATOLOGY
End: 2019-03-01

## 2019-03-01 DIAGNOSIS — Z71.89 OTHER SPECIFIED COUNSELING: ICD-10-CM

## 2019-03-01 DIAGNOSIS — D18.0 HEMANGIOMA: ICD-10-CM

## 2019-03-01 DIAGNOSIS — D485 NEOPLASM OF UNCERTAIN BEHAVIOR OF SKIN: ICD-10-CM

## 2019-03-01 DIAGNOSIS — L82.1 OTHER SEBORRHEIC KERATOSIS: ICD-10-CM

## 2019-03-01 DIAGNOSIS — L57.8 OTHER SKIN CHANGES DUE TO CHRONIC EXPOSURE TO NONIONIZING RADIATION: ICD-10-CM

## 2019-03-01 DIAGNOSIS — L57.0 ACTINIC KERATOSIS: ICD-10-CM

## 2019-03-01 DIAGNOSIS — D22 MELANOCYTIC NEVI: ICD-10-CM

## 2019-03-01 PROBLEM — D18.01 HEMANGIOMA OF SKIN AND SUBCUTANEOUS TISSUE: Status: ACTIVE | Noted: 2019-03-01

## 2019-03-01 PROBLEM — J30.1 ALLERGIC RHINITIS DUE TO POLLEN: Status: ACTIVE | Noted: 2019-03-01

## 2019-03-01 PROBLEM — D22.5 MELANOCYTIC NEVI OF TRUNK: Status: ACTIVE | Noted: 2019-03-01

## 2019-03-01 PROBLEM — Z85.828 PERSONAL HISTORY OF OTHER MALIGNANT NEOPLASM OF SKIN: Status: ACTIVE | Noted: 2019-03-01

## 2019-03-01 PROBLEM — D48.5 NEOPLASM OF UNCERTAIN BEHAVIOR OF SKIN: Status: ACTIVE | Noted: 2019-03-01

## 2019-03-01 PROCEDURE — ? LIQUID NITROGEN

## 2019-03-01 PROCEDURE — ? OBSERVATION

## 2019-03-01 PROCEDURE — ? BIOPSY BY SHAVE METHOD

## 2019-03-01 PROCEDURE — 17000 DESTRUCT PREMALG LESION: CPT | Mod: 59

## 2019-03-01 PROCEDURE — ? COUNSELING

## 2019-03-01 PROCEDURE — 99203 OFFICE O/P NEW LOW 30 MIN: CPT | Mod: 25

## 2019-03-01 PROCEDURE — 11102 TANGNTL BX SKIN SINGLE LES: CPT

## 2019-03-01 PROCEDURE — ? OTHER

## 2019-03-01 PROCEDURE — 17003 DESTRUCT PREMALG LES 2-14: CPT

## 2019-03-01 ASSESSMENT — LOCATION SIMPLE DESCRIPTION DERM
LOCATION SIMPLE: ABDOMEN
LOCATION SIMPLE: LEFT UPPER BACK
LOCATION SIMPLE: SCALP
LOCATION SIMPLE: LEFT SCALP
LOCATION SIMPLE: LEFT PRETIBIAL REGION
LOCATION SIMPLE: RIGHT SCALP

## 2019-03-01 ASSESSMENT — LOCATION DETAILED DESCRIPTION DERM
LOCATION DETAILED: RIGHT CENTRAL PARIETAL SCALP
LOCATION DETAILED: LEFT SUPERIOR FRONTAL SCALP
LOCATION DETAILED: RIGHT SUPERIOR PARIETAL SCALP
LOCATION DETAILED: LEFT SUPERIOR PARIETAL SCALP
LOCATION DETAILED: LEFT MID-UPPER BACK
LOCATION DETAILED: RIGHT MEDIAL FRONTAL SCALP
LOCATION DETAILED: EPIGASTRIC SKIN
LOCATION DETAILED: LEFT INFERIOR UPPER BACK
LOCATION DETAILED: LEFT PROXIMAL PRETIBIAL REGION
LOCATION DETAILED: LEFT CENTRAL FRONTAL SCALP

## 2019-03-01 ASSESSMENT — LOCATION ZONE DERM
LOCATION ZONE: LEG
LOCATION ZONE: TRUNK
LOCATION ZONE: SCALP

## 2019-03-01 NOTE — HPI: EVALUATION OF SKIN LESION(S)
How Severe Are Your Spot(S)?: mild
Have Your Spot(S) Been Treated In The Past?: has not been treated
Hpi Title: Evaluation of Skin Lesions
Additional History: Referred by YULI Shirley

## 2019-03-01 NOTE — PROCEDURE: BIOPSY BY SHAVE METHOD
Curettage Text: The wound bed was treated with curettage after the biopsy was performed.
Lab: [facility]
Bill For Surgical Tray: no
Detail Level: Detailed
Biopsy Method: 15 blade
Notification Instructions: Patient will be notified of biopsy results. However, patient instructed to call the office if not contacted within 2 weeks.
Hemostasis: Aluminum Chloride and Electrocautery
Silver Nitrate Text: The wound bed was treated with silver nitrate after the biopsy was performed.
Dressing: bandage
Was A Bandage Applied: Yes
Post-Care Instructions: I reviewed with the patient in detail post-care instructions. Patient is to keep the biopsy site dry overnight, and then apply bacitracin twice daily until healed. Patient may apply hydrogen peroxide soaks to remove any crusting. Patient can shower in 24 hours. Do not use Neosporin ointment.
Electrodesiccation And Curettage Text: The wound bed was treated with electrodesiccation and curettage after the biopsy was performed.
Type Of Destruction Used: Curettage
Anesthesia Volume In Cc (Will Not Render If 0): 0.5
Billing Type: [employer]
Biopsy Type: H and E
X Size Of Lesion In Cm: 0
Electrodesiccation Text: The wound bed was treated with electrodesiccation after the biopsy was performed.
Depth Of Biopsy: dermis
Wound Care: Bacitracin
Cryotherapy Text: The wound bed was treated with cryotherapy after the biopsy was performed.
Lab Facility: [address] [facility]
Anesthesia Type: 1% lidocaine with epinephrine
Consent: The providerâs intent is to obtain a tissue sample solely for diagnostic purposes. Written consent was obtained and risks were reviewed including but not limited to scarring, infection, bleeding, scabbing, incomplete removal, nerve damage and allergy to anesthesia.

## 2019-03-01 NOTE — PROCEDURE: LIQUID NITROGEN
Duration Of Freeze Thaw-Cycle (Seconds): 2
Detail Level: Detailed
Consent: The patient's consent was obtained including but not limited to risks of crusting, scabbing, blistering, scarring, darker or lighter pigmentary change, recurrence, incomplete removal and infection.
Number Of Freeze-Thaw Cycles: 3 freeze-thaw cycles
Post-Care Instructions: I reviewed with the patient in detail post-care instructions. Patient is to wear sunprotection, and avoid picking at any of the treated lesions. Pt may apply Vaseline to crusted or scabbing areas.
Render Post-Care Instructions In Note?: no

## 2019-03-01 NOTE — PROCEDURE: OTHER
Note Text (......Xxx Chief Complaint.): This diagnosis correlates with the
Detail Level: Zone
Other (Free Text): Letter and records will be sent to YULI Boland referring provider.

## 2019-03-12 ENCOUNTER — APPOINTMENT (RX ONLY)
Dept: URBAN - METROPOLITAN AREA CLINIC 128 | Facility: CLINIC | Age: 84
Setting detail: DERMATOLOGY
End: 2019-03-12

## 2019-03-12 ENCOUNTER — APPOINTMENT (RX ONLY)
Dept: URBAN - METROPOLITAN AREA CLINIC 117 | Facility: CLINIC | Age: 84
Setting detail: DERMATOLOGY
End: 2019-03-12

## 2019-03-12 DIAGNOSIS — Z01.818 ENCOUNTER FOR OTHER PREPROCEDURAL EXAMINATION: ICD-10-CM

## 2019-03-12 PROCEDURE — ? DIAGNOSIS COMMENT

## 2019-03-12 PROCEDURE — ? COUNSELING

## 2019-03-18 ENCOUNTER — RX ONLY (OUTPATIENT)
Age: 84
Setting detail: RX ONLY
End: 2019-03-18

## 2019-03-18 RX ORDER — AMOXICILLIN 500 MG/1
CAPSULE ORAL
Qty: 4 | Refills: 0 | Status: ERX | COMMUNITY
Start: 2019-03-18

## 2019-03-21 ENCOUNTER — APPOINTMENT (RX ONLY)
Dept: URBAN - METROPOLITAN AREA CLINIC 116 | Facility: CLINIC | Age: 84
Setting detail: DERMATOLOGY
End: 2019-03-21

## 2019-03-21 PROBLEM — D04.4 CARCINOMA IN SITU OF SKIN OF SCALP AND NECK: Status: ACTIVE | Noted: 2019-03-21

## 2019-03-21 PROCEDURE — ? MOHS SURGERY

## 2019-03-21 PROCEDURE — 17311 MOHS 1 STAGE H/N/HF/G: CPT

## 2019-03-21 NOTE — PROCEDURE: MIPS QUALITY
Quality 265: Biopsy Follow-Up: Biopsy results reviewed, communicated, tracked, and documented
Quality 474: Zoster Vaccination Status: Shingrix Vaccination Administered or Previously Received
Detail Level: Detailed
Quality 130: Documentation Of Current Medications In The Medical Record: Current Medications Documented

## 2019-03-21 NOTE — PROCEDURE: MOHS SURGERY
Stage 8: Additional Anesthesia Type: 1% lidocaine with epinephrine
Area L Indication Text: Tumors in this location are included in Area L (trunk and extremities). Mohs surgery is indicated for larger tumors, or tumors with aggressive histologic features, in these anatomic locations.
Provider Procedure Text (D): After obtaining clear surgical margins the defect was repaired by another provider.
Tarsorrhaphy Text: A tarsorrhaphy was performed using Frost sutures.
Mohs Histo Method Verbiage: Each section was then chromacoded and processed in the Mohs lab using the Mohs protocol and submitted for frozen section.
Consent (Spinal Accessory)/Introductory Paragraph: The rationale for Mohs was explained to the patient and consent was obtained. The risks, benefits and alternatives to therapy were discussed in detail. Specifically, the risks of damage to the spinal accessory nerve, infection, scarring, bleeding, prolonged wound healing, incomplete removal, allergy to anesthesia, and recurrence were addressed. Prior to the procedure, the treatment site was clearly identified and confirmed by the patient. All components of Universal Protocol/PAUSE Rule completed.
S Plasty Text: Given the location and shape of the defect, and the orientation of relaxed skin tension lines, an S-plasty was deemed most appropriate for repair. Using a sterile surgical marker, the appropriate outline of the S-plasty was drawn, incorporating the defect and placing the expected incisions within the relaxed skin tension lines where possible. The area thus outlined was incised deep to adipose tissue with a #15 scalpel blade. The skin margins were undermined to an appropriate distance in all directions utilizing iris scissors. The skin flaps were advanced over the defect. The opposing margins were then approximated with interrupted buried subcutaneous sutures.
Mid-Level Procedure Text (B): After obtaining clear surgical margins the patient was sent to a mid-level provider for surgical repair. The patient understands they will receive post-surgical care and follow-up from the mid-level provider.
Quadrants Reporting?: 0
Helical Rim Advancement Flap Text: The defect edges were debeveled with a #15 blade scalpel. Given the location of the defect and the proximity to free margins (helical rim) a double helical rim advancement flap was deemed most appropriate. Using a sterile surgical marker, the appropriate advancement flaps were drawn incorporating the defect and placing the expected incisions between the helical rim and antihelix where possible. The area thus outlined was incised through and through with a #15 scalpel blade. With a skin hook and iris scissors, the flaps were gently and sharply undermined and freed up.
Quadrant Reporting?: no
Oculoplastic Surgeon Procedure Text (C): After obtaining clear surgical margins the patient was sent to oculoplastics for surgical repair. The patient understands they will receive post-surgical care and follow-up from the referring physician's office.
Show Mohs Case Number Variable: Yes
Secondary Intention Text (Leave Blank If You Do Not Want): The defect will heal with secondary intention.
Bcc  Nodular Histology Text: Nodular aggregates of basaloid cells with large, hyperchromatic, oval nuclei and little cytoplasm aligning densely in a palisade pattern at the periphery of the nest
Inflammation Suggestive Of Cancer Camouflage Histology Text: There was a dense lymphocytic infiltrate which prevented adequate histologic evaluation of adjacent structures.
Split-Thickness Skin Graft Text: The defect edges were debeveled with a #15 scalpel blade. Given the location of the defect, shape of the defect and the proximity to free margins a split thickness skin graft was deemed most appropriate. Using a sterile surgical marker, the primary defect shape was transferred to the donor site. The split thickness graft was then harvested. The skin graft was then placed in the primary defect and oriented appropriately.
Scc Ka Subtype Histology Text: There is well-differentiated squamous epithelium with pleomorphism and masses of keratin.
Dressing: pressure dressing with telfa
Bilobed Flap Text: The defect edges were debeveled with a #15 scalpel blade. Given the location of the defect and the proximity to free margins a bilobe flap was deemed most appropriate. Using a sterile surgical marker, an appropriate bilobe flap drawn around the defect. The area thus outlined was incised deep to adipose tissue with a #15 scalpel blade. The skin margins were undermined to an appropriate distance in all directions utilizing iris scissors.
Mercedes Flap Text: The defect edges were debeveled with a #15 scalpel blade. Given the location of the defect, shape of the defect and the proximity to free margins a Mercedes flap was deemed most appropriate. Using a sterile surgical marker, an appropriate advancement flap was drawn incorporating the defect and placing the expected incisions within the relaxed skin tension lines where possible. The area thus outlined was incised deep to adipose tissue with a #15 scalpel blade. The skin margins were undermined to an appropriate distance in all directions utilizing iris scissors.
Asc Procedure Text (A): After obtaining clear surgical margins the patient was sent to an [facility] for surgical repair. The patient understands they will receive post-surgical care and follow-up from the [facility] physician.
Mohs Rapid Report Verbiage: The area of clinically evident tumor was marked with skin marking ink and appropriately hatched. The initial incision was made following the Mohs approach through the skin. The specimen was taken to the lab, divided into the necessary number of pieces, chromacoded and processed according to the Mohs protocol. This was repeated in successive stages until a tumor free defect was achieved.
Asc Procedure Text (F): After obtaining clear surgical margins the patient was sent to an [facility] for surgical repair.  The patient understands they will receive post-surgical care and follow-up from the ASC physician.
Referred To Plastics For Closure Text (Leave Blank If You Do Not Want): After obtaining clear surgical margins the patient was sent to plastics for surgical repair. The patient understands they will receive post-surgical care and follow-up from the referring physician's office.
Plastic Surgeon Procedure Text (B): After obtaining clear surgical margins the patient was sent to plastics for surgical repair.  The patient understands they will receive post-surgical care and follow-up from the referring physician's office.
Z Plasty Text: The lesion was extirpated to the level of the fat with a #15 scalpel blade. Given the location of the defect, shape of the defect and the proximity to free margins a Z-plasty was deemed most appropriate for repair. Using a sterile surgical marker, the appropriate transposition arms of the Z-plasty were drawn incorporating the defect and placing the expected incisions within the relaxed skin tension lines where possible. The area thus outlined was incised deep to adipose tissue with a #15 scalpel blade. The skin margins were undermined to an appropriate distance in all directions utilizing iris scissors. The opposing transposition arms were then transposed into place in opposite direction and anchored with interrupted buried subcutaneous sutures.
Hidradenocarcinoma Histology Text: On histologic exam, there are nodular, epithelioid, basaloid tumor cells with irregular infiltration of the surrounding dermis and foci of duct formation.
Epidermal Closure: running
Spiral Flap Text: The defect edges were debeveled with a #15 scalpel blade. Given the location of the defect, shape of the defect and the proximity to free margins a spiral flap was deemed most appropriate. Using a sterile surgical marker, an appropriate rotation flap was drawn incorporating the defect and placing the expected incisions within the relaxed skin tension lines where possible. The area thus outlined was incised deep to adipose tissue with a #15 scalpel blade. The skin margins were undermined to an appropriate distance in all directions utilizing iris scissors.
Skin Substitute Text: The defect edges were debeveled with a #15 scalpel blade. Given the location of the defect, shape of the defect and the proximity to free margins a skin substitute graft was deemed most appropriate. The graft material was trimmed to fit the size of the defect. The graft was then placed in the primary defect and oriented appropriately.
Island Pedicle Flap With Canthal Suspension Text: The defect edges were debeveled with a #15 scalpel blade. Given the location of the defect, shape of the defect and the proximity to free margins an island pedicle advancement flap was deemed most appropriate. Using a sterile surgical marker, an appropriate advancement flap was drawn incorporating the defect, outlining the appropriate donor tissue and placing the expected incisions within the relaxed skin tension lines where possible. The area thus outlined was incised deep to adipose tissue with a #15 scalpel blade. The skin margins were undermined to an appropriate distance in all directions around the primary defect and laterally outward around the island pedicle utilizing iris scissors. There was minimal undermining beneath the pedicle flap. A suspension suture was placed in the canthal tendon to prevent tension and prevent ectropion.
Initial Size Of Lesion: 1
Mastoid Interpolation Flap Text: A decision was made to reconstruct the defect utilizing an interpolation axial flap and a staged reconstruction. A telfa template was made of the defect. This telfa template was then used to outline the mastoid interpolation flap. The donor area for the pedicle flap was then injected with anesthesia. The flap was excised through the skin and subcutaneous tissue down to the layer of the underlying musculature. The pedicle flap was carefully excised within this deep plane to maintain its blood supply. The edges of the donor site were undermined. The donor site was closed in a primary fashion. The pedicle was then rotated into position and sutured. Once the tube was sutured into place, adequate blood supply was confirmed with blanching and refill. The pedicle was then wrapped with xeroform gauze and dressed appropriately with a telfa and gauze bandage to ensure continued blood supply and protect the attached pedicle.
Consent (Lip)/Introductory Paragraph: The rationale for Mohs was explained to the patient and consent was obtained. The risks, benefits and alternatives to therapy were discussed in detail. Specifically, the risks of lip deformity, changes in the oral aperture, infection, scarring, bleeding, prolonged wound healing, incomplete removal, allergy to anesthesia, nerve injury and recurrence were addressed. Prior to the procedure, the treatment site was clearly identified and confirmed by the patient. All components of Universal Protocol/PAUSE Rule completed.
Closure 4 Information: This tab is for additional flaps and grafts above and beyond our usual structured repairs. Please note if you enter information here it will not currently bill and you will need to add the billing information manually.
Advancement Flap (Double) Text: The defect edges were debeveled with a #15 scalpel blade. Given the location of the defect and the proximity to free margins a double advancement flap was deemed most appropriate. Using a sterile surgical marker, the appropriate advancement flaps were drawn incorporating the defect and placing the expected incisions within the relaxed skin tension lines where possible. The area thus outlined was incised deep to adipose tissue with a #15 scalpel blade. The skin margins were undermined to an appropriate distance in all directions utilizing iris scissors.
M-Plasty Complex Repair Preamble Text (Leave Blank If You Do Not Want): Extensive wide undermining was performed.
Melanoma In Situ Histology Text: Histologically, the lesion is asymmetrical, poorly circumscribed with architectural disturbance and marked cytological atypia
Consent 2/Introductory Paragraph: Mohs surgery was explained to the patient and consent was obtained. The risks, benefits and alternatives to therapy were discussed in detail. Specifically, the risks of infection, scarring, bleeding, prolonged wound healing, incomplete removal, allergy to anesthesia, nerve injury and recurrence were addressed. Prior to the procedure, the treatment site was clearly identified and confirmed by the patient. All components of Universal Protocol/PAUSE Rule completed.
Mohs Case Number: [id number]
Purse String (Intermediate) Text: Given the location of the defect and the characteristics of the surrounding skin a pursestring intermediate closure was deemed most appropriate. Undermining was performed circumfirentially around the surgical defect. A purstring suture was then placed and tightened.
Melolabial Transposition Flap Text: The defect edges were debeveled with a #15 scalpel blade. Given the location of the defect and the proximity to free margins a melolabial flap was deemed most appropriate. Using a sterile surgical marker, an appropriate melolabial transposition flap was drawn incorporating the defect. The area thus outlined was incised deep to adipose tissue with a #15 scalpel blade. The skin margins were undermined to an appropriate distance in all directions utilizing iris scissors.
M-Plasty Intermediate Repair Preamble Text (Leave Blank If You Do Not Want): Undermining was performed with blunt dissection.
Otolaryngologist Procedure Text (C): After obtaining clear surgical margins the patient was sent to otolaryngology for surgical repair. The patient understands they will receive post-surgical care and follow-up from the referring physician's office.
Keystone Flap Text: The defect edges were debeveled with a #15 scalpel blade. Given the location of the defect, shape of the defect a keystone flap was deemed most appropriate. Using a sterile surgical marker, an appropriate keystone flap was drawn incorporating the defect, outlining the appropriate donor tissue and placing the expected incisions within the relaxed skin tension lines where possible. The area thus outlined was incised deep to adipose tissue with a #15 scalpel blade. The skin margins were undermined to an appropriate distance in all directions around the primary defect and laterally outward around the flap utilizing iris scissors.
Primary Defect Width In Cm (Final Defect Size - Required For Flaps/Grafts): 1.1
Bcc Infundibulocystic Histology Text: Beneath an irregular epidermis, there are small nodular masses  of basaloid neoplastic cells aggregating in a cystic formation with nuclear pleomorphism attached to the basal layer and surrounded by a loose fibrous stroma and mild inflammation in the dermis. The findings are typical for a basal cell carcinoma.
O-T Advancement Flap Text: The defect edges were debeveled with a #15 scalpel blade. Given the location of the defect, shape of the defect and the proximity to free margins an O-T advancement flap was deemed most appropriate. Using a sterile surgical marker, an appropriate advancement flap was drawn incorporating the defect and placing the expected incisions within the relaxed skin tension lines where possible. The area thus outlined was incised deep to adipose tissue with a #15 scalpel blade. The skin margins were undermined to an appropriate distance in all directions utilizing iris scissors.
Scc Moderately Differentiated Histology Text: There are nests of squamous epithelial cells arising from the epidermis and extending into the dermis. The malignant cells are large with abundant eosinophilic cytoplasm and a large nucleus. Keratin pearls are also present.
Cheiloplasty (Complex) Text: A decision was made to reconstruct the defect with a  cheiloplasty. The defect was undermined extensively. Additional obicularis oris muscle was excised with a 15 blade scalpel. The defect was converted into a full thickness wedge to facilite a better cosmetic result. Small vessels were then tied off with 5-0 monocyrl. The obicularis oris, superficial fascia, adipose and dermis were then reapproximated. After the deeper layers were approximated the epidermis was reapproximated with particular care given to realign the vermillion border.
Graft Basting Suture (Optional): 5-0 Fast Absorbing Gut
Otolaryngologist Procedure Text (D): After obtaining clear surgical margins the patient was sent to otolaryngology for surgical repair.  The patient understands they will receive post-surgical care and follow-up from the referring physician's office.
O-L Flap Text: The defect edges were debeveled with a #15 scalpel blade. Given the location of the defect, shape of the defect and the proximity to free margins an O-L flap was deemed most appropriate. Using a sterile surgical marker, an appropriate advancement flap was drawn incorporating the defect and placing the expected incisions within the relaxed skin tension lines where possible. The area thus outlined was incised deep to adipose tissue with a #15 scalpel blade. The skin margins were undermined to an appropriate distance in all directions utilizing iris scissors.
Ear Wedge Repair Text: A wedge excision was completed by carrying down an excision through the full thickness of the ear and cartilage with an inward facing Burow's triangle. The wound was then closed in a layered fashion.
Scc Poorly Differentiated Histology Text: There are nests of squamous epithelial cells arising from the epidermis and extending into the dermis. The malignant cells are poorly differentiated.
Bilateral Helical Rim Advancement Flap Text: The defect edges were debeveled with a #15 blade scalpel. Given the location of the defect and the proximity to free margins (helical rim) a bilateral helical rim advancement flap was deemed most appropriate. Using a sterile surgical marker, the appropriate advancement flaps were drawn incorporating the defect and placing the expected incisions between the helical rim and antihelix where possible. The area thus outlined was incised through and through with a #15 scalpel blade. With a skin hook and iris scissors, the flaps were gently and sharply undermined and freed up.
Tumor Debulked?: not debulked due to thin skin location
Complex Repair And Flap Additional Text (Will Appearing After The Standard Complex Repair Text): The complex repair was not sufficient to completely close the primary defect. The remaining additional defect was repaired with the flap mentioned below.
Consent (Near Eyelid Margin)/Introductory Paragraph: The rationale for Mohs was explained to the patient and consent was obtained. The risks, benefits and alternatives to therapy were discussed in detail. Specifically, the risks of ectropion or eyelid deformity, infection, scarring, bleeding, prolonged wound healing, incomplete removal, allergy to anesthesia, nerve injury and recurrence were addressed. Prior to the procedure, the treatment site was clearly identified and confirmed by the patient. All components of Universal Protocol/PAUSE Rule completed.
Postop Diagnosis: same
Hemostasis: Electrocautery
Unna Boot Text: An Unna boot was placed to help immobilize the limb and facilitate more rapid healing.
No Repair - Repaired With Adjacent Surgical Defect Text (Leave Blank If You Do Not Want): After obtaining clear surgical margins the defect was repaired concurrently with another surgical defect which was in close approximation.
Mid-Level Procedure Text (C): After obtaining clear surgical margins the patient was sent to a mid-level provider for surgical repair.  The patient understands they will receive post-surgical care and follow-up from the mid-level provider.
Closure 3 Information: This tab is for additional flaps and grafts above and beyond our usual structured repairs.  Please note if you enter information here it will not currently bill and you will need to add the billing information manually.
Referring Physician (Optional): Amy Anne M.D.
Bcc  Nodulocystic Histology Text: Beneath an irregular epidermis, there are small nodular masses of basaloid neoplastic cells aggregating in a cystic formation with nuclear pleomorphism attached to the basal layer and surrounded by a loose fibrous stroma and mild inflammation in the dermis. The findings are typical for a basal cell carcinoma.
V-Y Plasty Text: The defect edges were debeveled with a #15 scalpel blade. Given the location of the defect, shape of the defect and the proximity to free margins an V-Y advancement flap was deemed most appropriate. Using a sterile surgical marker, an appropriate advancement flap was drawn incorporating the defect and placing the expected incisions within the relaxed skin tension lines where possible. The area thus outlined was incised deep to adipose tissue with a #15 scalpel blade. The skin margins were undermined to an appropriate distance in all directions utilizing iris scissors.
Oculoplastic Surgeon Procedure Text (D): After obtaining clear surgical margins the patient was sent to oculoplastics for surgical repair.  The patient understands they will receive post-surgical care and follow-up from the referring physician's office.
Modified Advancement Flap Text: The defect edges were debeveled with a #15 scalpel blade. Given the location of the defect, shape of the defect and the proximity to free margins a modified advancement flap was deemed most appropriate. Using a sterile surgical marker, an appropriate advancement flap was drawn incorporating the defect and placing the expected incisions within the relaxed skin tension lines where possible. The area thus outlined was incised deep to adipose tissue with a #15 scalpel blade. The skin margins were undermined to an appropriate distance in all directions utilizing iris scissors.
Graft Donor Site Dermal Sutures (Optional): 5-0 Monocryl
Location Indication Override (Is Already Calculated Based On Selected Body Location): Area M
Cartilage Graft Text: The defect edges were debeveled with a #15 scalpel blade. Given the location of the defect, shape of the defect, the fact the defect involved a full thickness cartilage defect a cartilage graft was deemed most appropriate. An appropriate donor site was identified, cleansed, and anesthetized. The cartilage graft was then harvested and transferred to the recipient site, oriented appropriately and then sutured into place. The secondary defect was then repaired using a primary closure.
Post-Care Instructions: WOUND CARE INSTRUCTIONS\\n\\nI reviewed the post-care instructions with the patient in detail. \\n\\nKeep our initial bandage on and dry for 48-72 hours as directed. After 48-72 hours,\\n\\n1. Cleanse the wound with peroxide gently. If there is a lot of crusting/scabbing, soak the site with peroxide to soften. \\n2. Apply a generous amount of Vaseline to the surgical site. DO NOT use Neosporin. \\n3. Cover the wound with a dressing. You can use a non-stick pad with paper tape or regular bandages. \\n4. Repeat twice daily, once in the morning, once in the evening. \\n\\n\\nPAIN [id number]\\Y\\L5. It is common to experience swelling, bruising, and drainage after surgery. To decrease pain, use extra strength Tylenol as directed on the bottle. Please do not use ibuprofen, Aleve, Motrin, or Excedrin as they may worsen bleeding. If Tylenol does not help with your pain, please call our office. Certain pain medications may require you to come to the office to  an actual paper prescription. Other intermediate (non-narcotic) strength pain medications may be called in for you if necessary. \\n\\n2. To decrease pain, patients can also try using an ice pack, a bag of frozen green peas, or a bag of frozen marshmellows. Place the ice pack on top of the bandage for 20-30 minutes, then take a break for 20-30 minutes (place the ice pack back in the freezer to get it cold again). Repeat as many times as necessary. \\n\\n\\nBLEEDING CONTROL\\n\\nIf bleeding should occur, apply firm direct pressure to the site for 30 minutes without easing up. If bleeding continues after 30 minutes, please call the office at any time. In rare instances, it may be necessary to go to the nearest emergency room. \\n\\n\\nMISCELLANEOUS INFORMATION\\n\\nThings to avoid while healing:\\n - NO heavy lifting, exercise, or swimming for the next 14 days. \\n - NO exposure to tap water for the next 48-72 hours. \\n - NO exposure to hot tub, swimming pool, or ocean water for the next 14 days. \\n\\n\\nCONTACT INFORMATION\\n\\nShould you have any questions or concerns, please call:\\n\\n1. [address] [facility] Location = [phone]\\n\\n2.  [address] [phone]
Bilobed Transposition Flap Text: The defect edges were debeveled with a #15 scalpel blade. Given the location of the defect and the proximity to free margins a bilobed transposition flap was deemed most appropriate. Using a sterile surgical marker, an appropriate bilobe flap drawn around the defect. The area thus outlined was incised deep to adipose tissue with a #15 scalpel blade. The skin margins were undermined to an appropriate distance in all directions utilizing iris scissors.
Information: Selecting Yes will display possible errors in your note based on the variables you have selected. This validation is only offered as a suggestion for you. PLEASE NOTE THAT THE VALIDATION TEXT WILL BE REMOVED WHEN YOU FINALIZE YOUR NOTE. IF YOU WANT TO FAX A PRELIMINARY NOTE YOU WILL NEED TO TOGGLE THIS TO 'NO' IF YOU DO NOT WANT IT IN YOUR FAXED NOTE.
Cheek Interpolation Flap Text: A decision was made to reconstruct the defect utilizing an interpolation axial flap and a staged reconstruction. A telfa template was made of the defect. This telfa template was then used to outline the Cheek Interpolation flap. The donor area for the pedicle flap was then injected with anesthesia. The flap was excised through the skin and subcutaneous tissue down to the layer of the underlying musculature. The interpolation flap was carefully excised within this deep plane to maintain its blood supply. The edges of the donor site were undermined. The donor site was closed in a primary fashion. The pedicle was then rotated into position and sutured. Once the tube was sutured into place, adequate blood supply was confirmed with blanching and refill. The pedicle was then wrapped with xeroform gauze and dressed appropriately with a telfa and gauze bandage to ensure continued blood supply and protect the attached pedicle.
Mixed Superficial And Nodular Bcc Histology Text: On the basal layer of and beneath an irregular epidermis, there are small nodular masses of basaloid neoplastic cells with nuclear pleomorphism attached to the basal layer and surrounded by a loose fibrous stroma and mild inflammation in the dermis. The findings are typical for a basal cell carcinoma.
Mixed Nodular And Infiltrative Bcc Histology Text: There are narrow strands of spiky, irregular basal cell carcinoma cells infiltrating between collagen bundles with mucin-rich stroma
Cheek-To-Nose Interpolation Flap Text: A decision was made to reconstruct the defect utilizing an interpolation axial flap and a staged reconstruction. A telfa template was made of the defect. This telfa template was then used to outline the Cheek-To-Nose Interpolation flap. The donor area for the pedicle flap was then injected with anesthesia. The flap was excised through the skin and subcutaneous tissue down to the layer of the underlying musculature. The interpolation flap was carefully excised within this deep plane to maintain its blood supply. The edges of the donor site were undermined. The donor site was closed in a primary fashion. The pedicle was then rotated into position and sutured. Once the tube was sutured into place, adequate blood supply was confirmed with blanching and refill. The pedicle was then wrapped with xeroform gauze and dressed appropriately with a telfa and gauze bandage to ensure continued blood supply and protect the attached pedicle.
Star Wedge Flap Text: The defect edges were debeveled with a #15 scalpel blade. Given the location of the defect, shape of the defect and the proximity to free margins a star wedge flap was deemed most appropriate. Using a sterile surgical marker, an appropriate rotation flap was drawn incorporating the defect and placing the expected incisions within the relaxed skin tension lines where possible. The area thus outlined was incised deep to adipose tissue with a #15 scalpel blade. The skin margins were undermined to an appropriate distance in all directions utilizing iris scissors.
Tissue Cultured Epidermal Autograft Text: The defect edges were debeveled with a #15 scalpel blade. Given the location of the defect, shape of the defect and the proximity to free margins a tissue cultured epidermal autograft was deemed most appropriate. The graft was then trimmed to fit the size of the defect. The graft was then placed in the primary defect and oriented appropriately.
Medical Necessity Statement: Based on my medical judgement; after reviewing the pertinent pathology report, physical exam findings and the various treatment options for skin cancer treatment; standard excision and/or destruction technique methods are not clinically sufficient treatments options. To prevent the risk of compromising surgical cure and reconstruction; prompt microscopic examination of the surgical margins is necessary. Based on the given indication(s), maximum conservation of healthy tissue, and high cure rate, Mohs surgery is the most appropriate treatment for this cancer.
Consent (Scalp)/Introductory Paragraph: The rationale for Mohs was explained to the patient and consent was obtained. The risks, benefits and alternatives to therapy were discussed in detail. Specifically, the risks of changes in hair growth pattern secondary to repair, infection, scarring, bleeding, prolonged wound healing, incomplete removal, allergy to anesthesia, nerve injury and recurrence were addressed. Prior to the procedure, the treatment site was clearly identified and confirmed by the patient. All components of Universal Protocol/PAUSE Rule completed.
Alar Island Pedicle Flap Text: The defect edges were debeveled with a #15 scalpel blade. Given the location of the defect, shape of the defect and the proximity to the alar rim an island pedicle advancement flap was deemed most appropriate. Using a sterile surgical marker, an appropriate advancement flap was drawn incorporating the defect, outlining the appropriate donor tissue and placing the expected incisions within the nasal ala running parallel to the alar rim. The area thus outlined was incised with a #15 scalpel blade. The skin margins were undermined minimally to an appropriate distance in all directions around the primary defect and laterally outward around the island pedicle utilizing iris scissors. There was minimal undermining beneath the pedicle flap.
Undermining Location (Optional): in the deep fat
Dressing (No Sutures): dry sterile dressing
Mauc Instructions: By selecting yes to the question below the [facility] number will be added into the note. This will be calculated automatically based on the diagnosis chosen, the size entered, the body zone selected (H,M,L) and the specific indications you chose. You will also have the option to override the Mohs AUC if you disagree with the automatically calculated number and this option is found in the Case Summary tab.
Burow's Advancement Flap Text: The defect edges were debeveled with a #15 scalpel blade. Given the location of the defect and the proximity to free margins a Burow's advancement flap was deemed most appropriate. Using a sterile surgical marker, the appropriate advancement flap was drawn incorporating the defect and placing the expected incisions within the relaxed skin tension lines where possible. The area thus outlined was incised deep to adipose tissue with a #15 scalpel blade. The skin margins were undermined to an appropriate distance in all directions utilizing iris scissors.
Closure 2 Information: This tab is for additional flaps and grafts, including complex repair and grafts and complex repair and flaps. You can also specify a different location for the additional defect, if the location is the same you do not need to select a new one. We will insert the automated text for the repair you select below just as we do for solitary flaps and grafts. Please note that at this time if you select a location with a different insurance zone you will need to override the ICD10 and CPT if appropriate.
Posterior Auricular Interpolation Flap Text: A decision was made to reconstruct the defect utilizing an interpolation axial flap and a staged reconstruction. A telfa template was made of the defect. This telfa template was then used to outline the posterior auricular interpolation flap. The donor area for the pedicle flap was then injected with anesthesia. The flap was excised through the skin and subcutaneous tissue down to the layer of the underlying musculature. The pedicle flap was carefully excised within this deep plane to maintain its blood supply. The edges of the donor site were undermined. The donor site was closed in a primary fashion. The pedicle was then rotated into position and sutured. Once the tube was sutured into place, adequate blood supply was confirmed with blanching and refill. The pedicle was then wrapped with xeroform gauze and dressed appropriately with a telfa and gauze bandage to ensure continued blood supply and protect the attached pedicle.
Deep Sutures: 4-0 Vicryl
Anesthesia Volume In Cc: 3
Rhombic Flap Text: The defect edges were debeveled with a #15 scalpel blade. Given the location of the defect and the proximity to free margins a rhombic flap was deemed most appropriate. Using a sterile surgical marker, an appropriate rhombic flap was drawn incorporating the defect. The area thus outlined was incised deep to adipose tissue with a #15 scalpel blade. The skin margins were undermined to an appropriate distance in all directions utilizing iris scissors.
Repair Type: No repair - secondary intention
Partial Purse String (Simple) Text: Given the location of the defect and the characteristics of the surrounding skin a simple purse string closure was deemed most appropriate. Undermining was performed circumfirentially around the surgical defect. A purse string suture was then placed and tightened. Wound tension only allowed a partial closure of the circular defect.
Date Of Previous Biopsy (Optional): 03-01-19
Eye Protection Verbiage: Before proceeding with the stage, a plastic scleral shield was inserted. The globe was anesthetized with a few drops of 1% lidocaine with 1:100,000 epinephrine. Then, an appropriate sized scleral shield was chosen and coated with lacrilube ointment. The shield was gently inserted and left in place for the duration of each stage. After the stage was completed, the shield was gently removed.
Consent 3/Introductory Paragraph: I gave the patient a chance to ask questions they had about the procedure. Following this I explained the Mohs procedure and consent was obtained. The risks, benefits and alternatives to therapy were discussed in detail. Specifically, the risks of infection, scarring, bleeding, prolonged wound healing, incomplete removal, allergy to anesthesia, nerve injury and recurrence were addressed. Prior to the procedure, the treatment site was clearly identified and confirmed by the patient. All components of Universal Protocol/PAUSE Rule completed.
O-T Plasty Text: The defect edges were debeveled with a #15 scalpel blade. Given the location of the defect, shape of the defect and the proximity to free margins an O-T plasty was deemed most appropriate. Using a sterile surgical marker, an appropriate O-T plasty was drawn incorporating the defect and placing the expected incisions within the relaxed skin tension lines where possible. The area thus outlined was incised deep to adipose tissue with a #15 scalpel blade. The skin margins were undermined to an appropriate distance in all directions utilizing iris scissors.
Non-Graft Cartilage Fenestration Text: The cartilage was fenestrated with a 2mm punch biopsy to help facilitate healing.
O-Z Plasty Text: The defect edges were debeveled with a #15 scalpel blade. Given the location of the defect, shape of the defect and the proximity to free margins an O-Z plasty (double transposition flap) was deemed most appropriate. Using a sterile surgical marker, the appropriate transposition flaps were drawn incorporating the defect and placing the expected incisions within the relaxed skin tension lines where possible. The area thus outlined was incised deep to adipose tissue with a #15 scalpel blade. The skin margins were undermined to an appropriate distance in all directions utilizing iris scissors. Hemostasis was achieved with electrocautery. The flaps were then transposed into place, one clockwise and the other counterclockwise, and anchored with interrupted buried subcutaneous sutures.
Bcc Micronodular Histology Text: Beneath an irregular epidermis, there are extremely small but infiltrative nodular masses of basaloid neoplastic cells with nuclear pleomorphism attached to the basal layer and surrounded by a loose fibrous stroma and mild inflammation in the dermis. The findings are typical for a basal cell carcinoma.
Same Histology In Subsequent Stages Text: The pattern and morphology of the tumor is as described in the first stage.
V-Y Flap Text: The defect edges were debeveled with a #15 scalpel blade. Given the location of the defect, shape of the defect and the proximity to free margins a V-Y flap was deemed most appropriate. Using a sterile surgical marker, an appropriate advancement flap was drawn incorporating the defect and placing the expected incisions within the relaxed skin tension lines where possible. The area thus outlined was incised deep to adipose tissue with a #15 scalpel blade. The skin margins were undermined to an appropriate distance in all directions utilizing iris scissors.
Full Thickness Lip Wedge Repair (Flap) Text: Given the location of the defect and the proximity to free margins a full thickness wedge repair was deemed most appropriate. Using a sterile surgical marker, the appropriate repair was drawn incorporating the defect and placing the expected incisions perpendicular to the vermillion border. The vermillion border was also meticulously outlined to ensure appropriate reapproximation during the repair. The area thus outlined was incised through and through with a #15 scalpel blade. The muscularis and dermis were reaproximated with deep sutures following hemostasis. Care was taken to realign the vermillion border before proceeding with the superficial closure. Once the vermillion was realigned the superfical and mucosal closure was finished.
Scc Acantholytic Histology Text: There are nests of squamous epithelial cells arising from the epidermis and extending into the dermis.  The malignant cells are demonstratic acantholysis
Wound Care: Vaseline
Ear Star Wedge Flap Text: The defect edges were debeveled with a #15 blade scalpel. Given the location of the defect and the proximity to free margins (helical rim) an ear star wedge flap was deemed most appropriate. Using a sterile surgical marker, the appropriate flap was drawn incorporating the defect and placing the expected incisions between the helical rim and antihelix where possible. The area thus outlined was incised through and through with a #15 scalpel blade.
Mid-Level (A): Joan Rubin PA-C
Complex Repair And Graft Additional Text (Will Appearing After The Standard Complex Repair Text): The complex repair was not sufficient to completely close the primary defect. The remaining additional defect was repaired with the graft mentioned below.
Consent Type: Consent 1 (Standard)
Home Suture Removal Text: Patient was provided instructions on removing sutures and will remove their sutures at home. If they have any questions or difficulties they will call the office.
Bcc Pigmented Histology Text: Functional melanocytes are scattered through the basal cell carcinoma tumor islands and there are numerous melanophages in the stroma
H Plasty Text: Given the location of the defect, shape of the defect and the proximity to free margins a H-plasty was deemed most appropriate for repair. Using a sterile surgical marker, the appropriate advancement arms of the H-plasty were drawn incorporating the defect and placing the expected incisions within the relaxed skin tension lines where possible. The area thus outlined was incised deep to adipose tissue with a #15 scalpel blade. The skin margins were undermined to an appropriate distance in all directions utilizing iris scissors. The opposing advancement arms were then advanced into place in opposite direction and anchored with interrupted buried subcutaneous sutures.
Trilobed Flap Text: The defect edges were debeveled with a #15 scalpel blade. Given the location of the defect and the proximity to free margins a trilobed flap was deemed most appropriate. Using a sterile surgical marker, an appropriate trilobed flap drawn around the defect. The area thus outlined was incised deep to adipose tissue with a #15 scalpel blade. The skin margins were undermined to an appropriate distance in all directions utilizing iris scissors.
Composite Graft Text: The defect edges were debeveled with a #15 scalpel blade. Given the location of the defect, shape of the defect, the proximity to free margins and the fact the defect was full thickness a composite graft was deemed most appropriate. The defect was outline and then transferred to the donor site. A full thickness graft was then excised from the donor site. The graft was then placed in the primary defect, oriented appropriately and then sutured into place. The secondary defect was then repaired using a primary closure.
Mucosal Advancement Flap Text: Given the location of the defect, shape of the defect and the proximity to free margins a mucosal advancement flap was deemed most appropriate. Incisions were made with a 15 blade scalpel in the appropriate fashion along the cutaneous vermillion border and the mucosal lip. The remaining actinically damaged mucosal tissue was excised. The mucosal advancement flap was then elevated to the gingival sulcus with care taken to preserve the neurovascular structures and advanced into the primary defect. Care was taken to ensure that precise realignment of the vermillion border was achieved.
Surgeon: Katrina Mcginnis M.D.
Dorsal Nasal Flap Text: The defect edges were debeveled with a #15 scalpel blade. Given the location of the defect and the proximity to free margins a dorsal nasal flap was deemed most appropriate. Using a sterile surgical marker, an appropriate dorsal nasal flap was drawn around the defect. The area thus outlined was incised deep to adipose tissue with a #15 scalpel blade. The skin margins were undermined to an appropriate distance in all directions utilizing iris scissors.
Consent (Ear)/Introductory Paragraph: The rationale for Mohs was explained to the patient and consent was obtained. The risks, benefits and alternatives to therapy were discussed in detail. Specifically, the risks of ear deformity, infection, scarring, bleeding, prolonged wound healing, incomplete removal, allergy to anesthesia, nerve injury and recurrence were addressed. Prior to the procedure, the treatment site was clearly identified and confirmed by the patient. All components of Universal Protocol/PAUSE Rule completed.
Mixed Nodular And Micronodular Bcc Histology Text: Beneath an irregular epidermis, there are varying sizes of nodular masses of basaloid neoplastic cells with nuclear pleomorphism attached to the basal layer and surrounded by a loose fibrous stroma and mild inflammation in the dermis. The findings are typical for a basal cell carcinoma.
Interpolation Flap Text: A decision was made to reconstruct the defect utilizing an interpolation axial flap and a staged reconstruction. A telfa template was made of the defect. This telfa template was then used to outline the interpolation flap. The donor area for the pedicle flap was then injected with anesthesia. The flap was excised through the skin and subcutaneous tissue down to the layer of the underlying musculature. The interpolation flap was carefully excised within this deep plane to maintain its blood supply. The edges of the donor site were undermined. The donor site was closed in a primary fashion. The pedicle was then rotated into position and sutured. Once the tube was sutured into place, adequate blood supply was confirmed with blanching and refill. The pedicle was then wrapped with xeroform gauze and dressed appropriately with a telfa and gauze bandage to ensure continued blood supply and protect the attached pedicle.
Afx Histology Text: Bizarre multinucleated tumor cells in hypercellular, spindly stroma with frequent mitotic figures. There are also smaller fibroblastic cells with pleomorphism and angulated nuclei confined to the dermis.
Double Island Pedicle Flap Text: The defect edges were debeveled with a #15 scalpel blade. Given the location of the defect, shape of the defect and the proximity to free margins a double island pedicle advancement flap was deemed most appropriate. Using a sterile surgical marker, an appropriate advancement flap was drawn incorporating the defect, outlining the appropriate donor tissue and placing the expected incisions within the relaxed skin tension lines where possible. The area thus outlined was incised deep to adipose tissue with a #15 scalpel blade. The skin margins were undermined to an appropriate distance in all directions around the primary defect and laterally outward around the island pedicle utilizing iris scissors. There was minimal undermining beneath the pedicle flap.
Xenograft Text: The defect edges were debeveled with a #15 scalpel blade. Given the location of the defect, shape of the defect and the proximity to free margins a xenograft was deemed most appropriate. The graft was then trimmed to fit the size of the defect. The graft was then placed in the primary defect and oriented appropriately.
Alternatives Discussed Intro (Do Not Add Period): I discussed alternative treatments to Mohs surgery and specifically discussed the risks and benefits of
Transposition Flap Text: After a lengthy discussion with the patient regarding the wound treatment reconstruction options available including but not limited to simple repair, intermediate repair, complex repair, adjacent tissue transfer, tissue graft as well as allowing the lesion to repair by secondary intention. It was determined that due to the defect location, complexity, and given indications; an adjacent tissue transfer (transposition flap) would be the most appropriate means for repair of the surgical defect as the defect extended through the skin into the subcutaneous tissues, and required rearrangement or transfer of adjacent tissue to repair the surgical wound. \\n\\n\\n\\n\\nThe defect edges were debeveled with a #15 scalpel blade. Given the location of the defect and the proximity to free margins a transposition flap was deemed most appropriate. Using a sterile surgical marker, an appropriate transposition flap was drawn incorporating the defect. The area thus outlined was incised deep to adipose tissue with a #15 scalpel blade. The skin margins were undermined to an appropriate distance in all directions utilizing iris scissors.
Manual Repair Warning Statement: We plan on removing the manually selected variable below in favor of our much easier automatic structured text blocks found in the previous tab. We decided to do this to help make the flow better and give you the full power of structured data. Manual selection is never going to be ideal in our platform and I would encourage you to avoid using manual selection from this point on, especially since I will be sunsetting this feature. It is important that you do one of two things with the customized text below. First, you can save all of the text in a word file so you can have it for future reference. Second, transfer the text to the appropriate area in the Library tab. Lastly, if there is a flap or graft type which we do not have you need to let us know right away so I can add it in before the variable is hidden. No need to panic, we plan to give you roughly 6 months to make the change.
Body Location Override (Optional - Billing Will Still Be Based On Selected Body Map Location If Applicable): left superior frontal scalp
Detail Level: Detailed
Bcc Histology Text: Beneath an irregular epidermis, there are small nodular masses of basaloid neoplastic cells with nuclear pleomorphism attached to the basal layer and surrounded by a loose fibrous stroma and mild inflammation in the dermis. The findings are typical for a basal cell carcinoma.
Donor Site Anesthesia Type: same as repair anesthesia
Crescentic Advancement Flap Text: The defect edges were debeveled with a #15 scalpel blade. Given the location of the defect and the proximity to free margins a crescentic advancement flap was deemed most appropriate. Using a sterile surgical marker, the appropriate advancement flap was drawn incorporating the defect and placing the expected incisions within the relaxed skin tension lines where possible. The area thus outlined was incised deep to adipose tissue with a #15 scalpel blade. The skin margins were undermined to an appropriate distance in all directions utilizing iris scissors.
Scc Histology Text: There are nests of squamous epithelial cells arising from the epidermis and extending into the dermis. The malignant cells are large with abundant eosinophilic cytoplasm and a large vesicular nucleus.
Paramedian Forehead Flap Text: A decision was made to reconstruct the defect utilizing an interpolation axial flap and a staged reconstruction. A telfa template was made of the defect. This telfa template was then used to outline the paramedian forehead pedicle flap. The donor area for the pedicle flap was then injected with anesthesia. The flap was excised through the skin and subcutaneous tissue down to the layer of the underlying musculature. The pedicle flap was carefully excised within this deep plane to maintain its blood supply. The edges of the donor site were undermined. The donor site was closed in a primary fashion. The pedicle was then rotated into position and sutured. Once the tube was sutured into place, adequate blood supply was confirmed with blanching and refill. The pedicle was then wrapped with xeroform gauze and dressed appropriately with a telfa and gauze bandage to ensure continued blood supply and protect the attached pedicle.
Area H Indication Text: Tumors in this location are included in Area H (eyelids, eyebrows, nose, lips, chin, ear, pre-auricular, post-auricular, temple, genitalia, hands, feet, ankles and areola). Tissue conservation is critical in these anatomic locations.
Partial Purse String (Intermediate) Text: Given the location of the defect and the characteristics of the surrounding skin an intermediate purse string closure was deemed most appropriate. Undermining was performed circumfirentially around the surgical defect. A purse string suture was then placed and tightened. Wound tension only allowed a partial closure of the circular defect.
Consent (Temporal Branch)/Introductory Paragraph: The rationale for Mohs was explained to the patient and consent was obtained. The risks, benefits and alternatives to therapy were discussed in detail. Specifically, the risks of damage to the temporal branch of the facial nerve, infection, scarring, bleeding, prolonged wound healing, incomplete removal, allergy to anesthesia, and recurrence were addressed. Prior to the procedure, the treatment site was clearly identified and confirmed by the patient. All components of Universal Protocol/PAUSE Rule completed.
Mohs Method Verbiage: An incision at a 45 degree angle following the standard Mohs approach was done and the specimen was harvested as a microscopic controlled layer.
Rhomboid Transposition Flap Text: The defect edges were debeveled with a #15 scalpel blade. Given the location of the defect and the proximity to free margins a rhomboid transposition flap was deemed most appropriate. Using a sterile surgical marker, an appropriate rhomboid flap was drawn incorporating the defect. The area thus outlined was incised deep to adipose tissue with a #15 scalpel blade. The skin margins were undermined to an appropriate distance in all directions utilizing iris scissors.
Previous Accession (Optional): [id number]
Surgeon/Pathologist Verbiage (Will Incorporate Name Of Surgeon From Intro If Not Blank): operated in two distinct and integrated capacities as the surgeon and pathologist. [address] was completed at this time and should be considered part of the medical record. Refer to attached Mohs Map for additional details including but not limited to: address where microscope slide was read, patient name, patient medical record number, date of service, patient insurance, referring provider, biopsy date, tumor type, tumor site, initial pre-operative size, Mohs accession #, Mohs indications, details of Mohs procedure including stages, debulk status, tumor type / pattern / morphology, depth of invasion, inflammation, perineural invasion, scar tissue, margins status, tissue blocks, and final measurement size.
No Residual Tumor Seen Histology Text: There were no malignant cells seen in the sections examined.
Consent (Marginal Mandibular)/Introductory Paragraph: The rationale for Mohs was explained to the patient and consent was obtained. The risks, benefits and alternatives to therapy were discussed in detail. Specifically, the risks of damage to the marginal mandibular branch of the facial nerve, infection, scarring, bleeding, prolonged wound healing, incomplete removal, allergy to anesthesia, and recurrence were addressed. Prior to the procedure, the treatment site was clearly identified and confirmed by the patient. All components of Universal Protocol/PAUSE Rule completed.
Localized Dermabrasion Text: The patient was draped in routine manner. Localized dermabrasion using 3 x 17 mm wire brush was performed in routine manner to papillary dermis. This spot dermabrasion is being performed to complete skin cancer reconstruction. It also will eliminate the other sun damaged precancerous cells that are known to be part of the regional effect of a lifetime's worth of sun exposure. This localized dermabrasion is therapeutic and should not be considered cosmetic in any regard.
Bcc  Morpheaform/Sclerosing Histology Text: Beneath an irregular epidermis, there are bizarrely shaped masses of basaloid neoplastic cells with nuclear pleomorphism attached to the basal layer and surrounded by a rapidly forming scar and mild inflammation in the dermis. The findings are typical for a basal cell carcinoma.
Double O-Z Plasty Text: The defect edges were debeveled with a #15 scalpel blade. Given the location of the defect, shape of the defect and the proximity to free margins a Double O-Z plasty (double transposition flap) was deemed most appropriate. Using a sterile surgical marker, the appropriate transposition flaps were drawn incorporating the defect and placing the expected incisions within the relaxed skin tension lines where possible. The area thus outlined was incised deep to adipose tissue with a #15 scalpel blade. The skin margins were undermined to an appropriate distance in all directions utilizing iris scissors. Hemostasis was achieved with electrocautery. The flaps were then transposed into place, one clockwise and the other counterclockwise, and anchored with interrupted buried subcutaneous sutures.
Advancement-Rotation Flap Text: The defect edges were debeveled with a #15 scalpel blade. Given the location of the defect, shape of the defect and the proximity to free margins an advancement-rotation flap was deemed most appropriate. Using a sterile surgical marker, an appropriate flap was drawn incorporating the defect and placing the expected incisions within the relaxed skin tension lines where possible. The area thus outlined was incised deep to adipose tissue with a #15 scalpel blade. The skin margins were undermined to an appropriate distance in all directions utilizing iris scissors.
Ftsg Text: The defect edges were debeveled with a #15 scalpel blade. Given the location of the defect, shape of the defect and the proximity to free margins a full thickness skin graft was deemed most appropriate. Using a sterile surgical marker, the primary defect shape was transferred to the donor site. The area thus outlined was incised deep to adipose tissue with a #15 scalpel blade. The harvested graft was then trimmed of adipose tissue until only dermis and epidermis was left. The skin margins of the secondary defect were undermined to an appropriate distance in all directions utilizing iris scissors. The secondary defect was closed with interrupted buried subcutaneous sutures. The skin edges were then re-apposed with running  sutures. The skin graft was then placed in the primary defect and oriented appropriately.
Graft Cartilage Fenestration Text: The cartilage was fenestrated with a 2mm punch biopsy to help facilitate graft survival and healing.
Repair Anesthesia Method: local infiltration
Epidermal Sutures: 5-0 Prolene
Subsequent Stages Histo Method Verbiage: Using a similar technique to that described above, a thin layer of tissue was removed from all areas where tumor was visible on the previous stage. The tissue was again oriented, mapped, dyed, and processed as above.
Banner Transposition Flap Text: The defect edges were debeveled with a #15 scalpel blade. Given the location of the defect and the proximity to free margins a Banner transposition flap was deemed most appropriate. Using a sterile surgical marker, an appropriate flap drawn around the defect. The area thus outlined was incised deep to adipose tissue with a #15 scalpel blade. The skin margins were undermined to an appropriate distance in all directions utilizing iris scissors.
Estimated Blood Loss (Cc): minimal
Scc In Situ Histology Text: Atypia of the keratinocytes across the full thickness of the epidermis with loss of the granular layer and overlying zones of parakeratosis
Bcc Superficial Histology Text: On the basal layer of an irregular epidermis, there are small nodular masses of basaloid neoplastic cells with nuclear pleomorphism attached to the basal layer and surrounded by a loose fibrous stroma and mild inflammation in the dermis. The findings are typical for a basal cell carcinoma.
W Plasty Text: The lesion was extirpated to the level of the fat with a #15 scalpel blade. Given the location of the defect, shape of the defect and the proximity to free margins a W-plasty was deemed most appropriate for repair. Using a sterile surgical marker, the appropriate transposition arms of the W-plasty were drawn incorporating the defect and placing the expected incisions within the relaxed skin tension lines where possible. The area thus outlined was incised deep to adipose tissue with a #15 scalpel blade. The skin margins were undermined to an appropriate distance in all directions utilizing iris scissors. The opposing transposition arms were then transposed into place in opposite direction and anchored with interrupted buried subcutaneous sutures.
Hatchet Flap Text: The defect edges were debeveled with a #15 scalpel blade. Given the location of the defect, shape of the defect and the proximity to free margins a hatchet flap was deemed most appropriate. Using a sterile surgical marker, an appropriate hatchet flap was drawn incorporating the defect and placing the expected incisions within the relaxed skin tension lines where possible. The area thus outlined was incised deep to adipose tissue with a #15 scalpel blade. The skin margins were undermined to an appropriate distance in all directions utilizing iris scissors.
Graft Donor Site Epidermal Sutures (Optional): 6-0 Prolene
Epidermal Autograft Text: The defect edges were debeveled with a #15 scalpel blade. Given the location of the defect, shape of the defect and the proximity to free margins an epidermal autograft was deemed most appropriate. Using a sterile surgical marker, the primary defect shape was transferred to the donor site. The epidermal graft was then harvested. The skin graft was then placed in the primary defect and oriented appropriately.
Surgeon Performing Repair (Optional): Dr. Susie Soto
Dermal Autograft Text: The defect edges were debeveled with a #15 scalpel blade. Given the location of the defect, shape of the defect and the proximity to free margins a dermal autograft was deemed most appropriate. Using a sterile surgical marker, the primary defect shape was transferred to the donor site. The area thus outlined was incised deep to adipose tissue with a #15 scalpel blade. The harvested graft was then trimmed of adipose and epidermal tissue until only dermis was left. The skin graft was then placed in the primary defect and oriented appropriately.
Consent (Nose)/Introductory Paragraph: The rationale for Mohs was explained to the patient and consent was obtained. The risks, benefits and alternatives to therapy were discussed in detail. Specifically, the risks of nasal deformity, changes in the flow of air through the nose, infection, scarring, bleeding, prolonged wound healing, incomplete removal, allergy to anesthesia, nerve injury and recurrence were addressed. Prior to the procedure, the treatment site was clearly identified and confirmed by the patient. All components of Universal Protocol/PAUSE Rule completed.
Island Pedicle Flap Text: The defect edges were debeveled with a #15 scalpel blade. Given the location of the defect, shape of the defect and the proximity to free margins an island pedicle advancement flap was deemed most appropriate. Using a sterile surgical marker, an appropriate advancement flap was drawn incorporating the defect, outlining the appropriate donor tissue and placing the expected incisions within the relaxed skin tension lines where possible. The area thus outlined was incised deep to adipose tissue with a #15 scalpel blade. The skin margins were undermined to an appropriate distance in all directions around the primary defect and laterally outward around the island pedicle utilizing iris scissors. There was minimal undermining beneath the pedicle flap.
Wound Care (No Sutures): Petrolatum
Rotation Flap Text: After a lengthy discussion with the patient regarding the wound treatment reconstruction options available including but not limited to simple repair, intermediate repair, complex repair, adjacent tissue transfer, tissue graft as well as allowing the lesion to repair by secondary intention. It was determined that due to the defect location, complexity, and given indications; an adjacent tissue transfer (rotation flap) would be the most appropriate means for repair of the surgical defect as the defect extended through the skin into the subcutaneous tissues, and required rearrangement or transfer of adjacent tissue to repair the surgical wound. \\n\\n\\n\\n\\nThe defect edges were debeveled with a #15 scalpel blade. Given the location of the defect, shape of the defect and the proximity to free margins a rotation flap was deemed most appropriate. Using a sterile surgical marker, an appropriate rotation flap was drawn incorporating the defect and placing the expected incisions within the relaxed skin tension lines where possible. The area thus outlined was incised deep to adipose tissue with a #15 scalpel blade. The skin margins were undermined to an appropriate distance in all directions utilizing iris scissors.
Repair Hemostasis (Optional): Electrodesiccation
Advancement Flap (Single) Text: After a lengthy discussion with the patient regarding the wound treatment reconstruction options available including but not limited to simple repair, intermediate repair, complex repair, adjacent tissue transfer, tissue graft as well as allowing the lesion to repair by secondary intention. It was determined that due to the defect location, complexity, and given indications; an adjacent tissue transfer (advancement flap) would be the most appropriate means for repair of the surgical defect as the defect extended through the skin into the subcutaneous tissues, and required rearrangement or transfer of adjacent tissue to repair the surgical wound. \\n\\n\\n\\n\\n\\nThe defect edges were debeveled with a #15 scalpel blade. Given the location of the defect and the proximity to free margins a single advancement flap was deemed most appropriate. Using a sterile surgical marker, an appropriate advancement flap was drawn incorporating the defect and placing the expected incisions within the relaxed skin tension lines where possible. The area thus outlined was incised deep to adipose tissue with a #15 scalpel blade. The skin margins were undermined to an appropriate distance in all directions utilizing iris scissors.
Melolabial Interpolation Flap Text: A decision was made to reconstruct the defect utilizing an interpolation axial flap and a staged reconstruction. A telfa template was made of the defect. This telfa template was then used to outline the melolabial interpolation flap. The donor area for the pedicle flap was then injected with anesthesia. The flap was excised through the skin and subcutaneous tissue down to the layer of the underlying musculature. The pedicle flap was carefully excised within this deep plane to maintain its blood supply. The edges of the donor site were undermined. The donor site was closed in a primary fashion. The pedicle was then rotated into position and sutured. Once the tube was sutured into place, adequate blood supply was confirmed with blanching and refill. The pedicle was then wrapped with xeroform gauze and dressed appropriately with a telfa and gauze bandage to ensure continued blood supply and protect the attached pedicle.
Muscle Hinge Flap Text: The defect edges were debeveled with a #15 scalpel blade. Given the size, depth and location of the defect and the proximity to free margins a muscle hinge flap was deemed most appropriate. Using a sterile surgical marker, an appropriate hinge flap was drawn incorporating the defect. The area thus outlined was incised with a #15 scalpel blade. The skin margins were undermined to an appropriate distance in all directions utilizing iris scissors.
Purse String (Simple) Text: Given the location of the defect and the characteristics of the surrounding skin a pursestring closure was deemed most appropriate. Undermining was performed circumfirentially around the surgical defect. A purstring suture was then placed and tightened.
Consent 1/Introductory Paragraph: Various treatment options for skin cancer treatment; including but not limited to no treatment, cryosurgery or cryotherapy, excision, radiation therapy, electrodessication and curettage, topical therapeutic agents and light therapy were discussed in depth with the patient. The rationale for Mohs was explained to the patient and consent was obtained. The risks, benefits and alternatives to therapy were discussed in detail. Specifically, the risks of infection, scarring, bleeding, prolonged wound healing, incomplete removal, allergy to anesthesia, nerve injury and recurrence were addressed. Prior to the procedure, the treatment site was clearly identified and confirmed by the patient and the patient agreed that Mohs surgery is the best treatment option for their lesion. No guarantees were made or implied; close follow-up was stressed out to the patient. All components of Universal Protocol/PAUSE Rule completed.
Bcc Infiltrative Histology Text: There were numerous aggregates of basaloid cells demonstrating an infiltrative pattern.
Island Pedicle Flap-Requiring Vessel Identification Text: The defect edges were debeveled with a #15 scalpel blade. Given the location of the defect, shape of the defect and the proximity to free margins an island pedicle advancement flap was deemed most appropriate. Using a sterile surgical marker, an appropriate advancement flap was drawn, based on the axial vessel mentioned above, incorporating the defect, outlining the appropriate donor tissue and placing the expected incisions within the relaxed skin tension lines where possible. The area thus outlined was incised deep to adipose tissue with a #15 scalpel blade. The skin margins were undermined to an appropriate distance in all directions around the primary defect and laterally outward around the island pedicle utilizing iris scissors. There was minimal undermining beneath the pedicle flap.
A-T Advancement Flap Text: The defect edges were debeveled with a #15 scalpel blade. Given the location of the defect, shape of the defect and the proximity to free margins an A-T advancement flap was deemed most appropriate. Using a sterile surgical marker, an appropriate advancement flap was drawn incorporating the defect and placing the expected incisions within the relaxed skin tension lines where possible. The area thus outlined was incised deep to adipose tissue with a #15 scalpel blade. The skin margins were undermined to an appropriate distance in all directions utilizing iris scissors.
Area M Indication Text: Tumors in this location are included in Area M (cheek, forehead, scalp, neck, jawline and pretibial skin). Mohs surgery is indicated for tumors in these anatomic locations.
Cheiloplasty (Less Than 50%) Text: A decision was made to reconstruct the defect with a  cheiloplasty. The defect was undermined extensively. Additional obicularis oris muscle was excised with a 15 blade scalpel. The defect was converted into a full thickness wedge, of less than 50% of the vertical height of the lip, to facilite a better cosmetic result. Small vessels were then tied off with 5-0 monocyrl. The obicularis oris, superficial fascia, adipose and dermis were then reapproximated. After the deeper layers were approximated the epidermis was reapproximated with particular care given to realign the vermillion border.
Bi-Rhombic Flap Text: The defect edges were debeveled with a #15 scalpel blade. Given the location of the defect and the proximity to free margins a bi-rhombic flap was deemed most appropriate. Using a sterile surgical marker, an appropriate rhombic flap was drawn incorporating the defect. The area thus outlined was incised deep to adipose tissue with a #15 scalpel blade. The skin margins were undermined to an appropriate distance in all directions utilizing iris scissors.

## 2019-10-08 NOTE — PROGRESS NOTES
HPI:   I had the pleasure of seeing Corwin when he and wife Anahy came for follow up of syncope.  This 89 year old sees Dr. Sanchez for his history of:    1. Syncope noted 7/2018 while sitting  2. Reported pAFib by EMS tracing at time of syncopal episode 7/2018. NO AFib has been seen while in hospital or on subsequent monitors. Therefore, not on AC.  3. SVT noted on event monitor 8/2019, with rates to 240 bpm. Dr. Sanchez discussed ablation, amiodarone and Diltiazem and given advanced age, they mutually agreed on Diltiazem.     Dr. Sanchez saw Corwin 10/2018 at which time he was doing well. His chronic constipation had worsened somewhat on the Diltiazem, but he otherwise thought it was working well. They reviewed a 14 day ZP while on Diltiazem 180, showing 63 episodes of SVT, with rates typically 140-160s. As there was an improvement in duration and frequency of SVT on Diltiazem, no changes were made.    Interval History:  Feels like things are going well. Diltiazem has worked well to control rapid HR/palpitations.  Chronic constipation remains under good control.  He's had no lightheadedness, dizziness, syncope, CP, SOB or change in exercise tolerance.    BPs at home are typically 110-120s/70s with HR 70s.    Was at [facility] for routine f/u and reviewed his studies done in [place] showing osteoporosis. Now on Prolia.    Diagnostic Testing:  EKG today, which I overread, showed SR with arrhythmia. PVCs x 2 noted  Echocardiogam 7/2018 showed EF 55-60% with grade I diastolic dysfunction. Normal RV size/function. Mild JON. 1+ MR. Trace TR with RVSP 24.2 mmHg+RAP. Aortic sclerosis.       Assessment & Plan:    1. Syncope 7/2018    Has not recurred    Based on ZioPatch, felt to be related to tachycardia (has both narrow and wide QRS tachycardias noted)    Tolerating Diltiazem 180 mg daily without difficulty    PLAN:    Continue Diltiazem 180 mg daily    Monitor carefully for any syncope, lightheadedness, dizziness or change and contact  us. We can always adjust dose/repeat testing if needed    See us 1 year      2. PVCs    Noted multifocal PVCs on EKG today    Echo 2018 with normal EF. Echo stress ([place]) 2015 reportedly unremarkable    PLAN:    Asx'c. No CP or concerning ischemic changes    Continue to monitor    Maisha Barnes PA-C, MSPAS      Orders Placed This Encounter   Procedures     Follow-Up with Electrophysiologist     EKG 12-lead complete w/read - Clinics (performed today)     EKG 12-lead complete w/read - Clinics (to be scheduled)     No orders of the defined types were placed in this encounter.    There are no discontinued medications.      Encounter Diagnosis   Name Primary?     Paroxysmal supraventricular tachycardia (H)        CURRENT MEDICATIONS:  Current Outpatient Medications   Medication Sig Dispense Refill     aspirin  MG EC tablet Take 1 tablet (325 mg) by mouth daily 40 tablet 1     Atorvastatin Calcium (LIPITOR PO) Take 10 mg by mouth daily        betamethasone dipropionate (DIPROSONE) 0.05 % cream Apply topically 2 times daily as needed        cholecalciferol (VITAMIN D) 400 UNITS tablet Take 400 Units by mouth daily       diltiazem (DILACOR XR) 180 MG 24 hr capsule Take 1 capsule (180 mg) by mouth daily 30 capsule 11     DiphenhydrAMINE HCl (BENADRYL PO) Take 25 mg by mouth daily as needed       hydrocortisone 1 % CREA cream Apply topically 2 times daily as needed for itching       loratadine (CLARITIN) 10 MG tablet Take 10 mg by mouth daily       LORazepam (ATIVAN PO) Take 0.25 mg by mouth daily as needed        metroNIDAZOLE (METROGEL) 1 % gel Apply topically daily as needed        Multiple Vitamins-Minerals (PRESERVISION AREDS PO) Take 1 capsule by mouth 2 times daily       polyethylene glycol 0.4%- propylene glycol 0.3% (SYSTANE ULTRA) 0.4-0.3 % SOLN ophthalmic solution Place 1 drop into both eyes 2 times daily       saline 0.9 % AERS Spray 1 spray in nostril 2 times daily          ALLERGIES     Allergies   Allergen  Reactions     Alendronic Acid      Mold      Peanut-Derived      Pollen Extract      Risedronic Acid [Risedronate]        PAST MEDICAL HISTORY:  Past Medical History:   Diagnosis Date     Atrial fibrillation (H)      Hypertension        PAST SURGICAL HISTORY:  Past Surgical History:   Procedure Laterality Date     ORTHOPEDIC SURGERY      left ankle fusion     ORTHOPEDIC SURGERY      right knee replacement        FAMILY HISTORY:  History reviewed. No pertinent family history.    SOCIAL HISTORY:  Social History     Socioeconomic History     Marital status:      Spouse name: None     Number of children: None     Years of education: None     Highest education level: None   Occupational History     None   Social Needs     Financial resource strain: None     Food insecurity:     Worry: None     Inability: None     Transportation needs:     Medical: None     Non-medical: None   Tobacco Use     Smoking status: Never Smoker     Smokeless tobacco: Never Used   Substance and Sexual Activity     Alcohol use: Yes     Comment: one shot a day      Drug use: No     Sexual activity: Never   Lifestyle     Physical activity:     Days per week: None     Minutes per session: None     Stress: None   Relationships     Social connections:     Talks on phone: None     Gets together: None     Attends Taoism service: None     Active member of club or organization: None     Attends meetings of clubs or organizations: None     Relationship status: None     Intimate partner violence:     Fear of current or ex partner: None     Emotionally abused: None     Physically abused: None     Forced sexual activity: None   Other Topics Concern     Parent/sibling w/ CABG, MI or angioplasty before 65F 55M? Not Asked   Social History Narrative     None       Review of Systems:  Skin:  Negative     Eyes:  Negative    ENT:  Negative    Respiratory:  Negative for shortness of breath;dyspnea on exertion;cough  Cardiovascular:  Negative for;palpitations  "   Gastroenterology: Positive for constipation  Genitourinary:  Negative    Musculoskeletal:  Negative    Neurologic:  Negative    Psychiatric:  Negative    Heme/Lymph/Imm:  Negative    Endocrine:  Negative      Physical Exam:  Vitals: /76   Pulse 64   Ht 1.753 m (5' 9\")   Wt 59.1 kg (130 lb 6.4 oz)   BMI 19.26 kg/m      Constitutional:  cooperative, alert and oriented, well developed, well nourished, in no acute distress        Skin:  warm and dry to the touch, no apparent skin lesions or masses noted        Head:  normocephalic, no masses or lesions        Eyes:  pupils equal and round;conjunctivae and lids unremarkable;sclera white        ENT:  no pallor or cyanosis, dentition good        Neck:  no carotid bruit;no stiffness        Chest:  normal breath sounds, clear to auscultation, normal A-P diameter, normal symmetry, normal respiratory excursion, no use of accessory muscles        Cardiac: regular rhythm       systolic ejection murmur          Abdomen:  abdomen soft        Vascular: pulses full and equal                                      Extremities and Back:  no edema;no deformities, clubbing, cyanosis, erythema observed        Neurological:  no gross motor deficits          Recent Lab Results:  LIPID RESULTS:  Lab Results   Component Value Date    CHOL 87 08/13/2017    HDL 45 08/13/2017    LDL 35 08/13/2017    TRIG 33 08/13/2017       LIVER ENZYME RESULTS:  No results found for: AST, ALT    CBC RESULTS:  Lab Results   Component Value Date    WBC 7.1 07/24/2018    RBC 3.57 (L) 07/24/2018    HGB 12.3 (L) 07/24/2018    HCT 35.3 (L) 07/24/2018    MCV 99 07/24/2018    MCH 34.5 (H) 07/24/2018    MCHC 34.8 07/24/2018    RDW 14.2 07/24/2018     07/24/2018       BMP RESULTS:  Lab Results   Component Value Date     (L) 07/24/2018    POTASSIUM 4.0 07/24/2018    CHLORIDE 99 07/24/2018    CO2 27 07/24/2018    ANIONGAP 6 07/24/2018    GLC 95 07/24/2018    BUN 14 07/24/2018    CR 0.77 07/24/2018 "    GFRESTIMATED >90 07/24/2018    GFRESTBLACK >90 07/24/2018    VIRGEN 8.2 (L) 07/24/2018

## 2019-10-10 ENCOUNTER — OFFICE VISIT (OUTPATIENT)
Dept: CARDIOLOGY | Facility: CLINIC | Age: 84
End: 2019-10-10
Attending: INTERNAL MEDICINE
Payer: MEDICARE

## 2019-10-10 VITALS
DIASTOLIC BLOOD PRESSURE: 76 MMHG | WEIGHT: 130.4 LBS | SYSTOLIC BLOOD PRESSURE: 132 MMHG | BODY MASS INDEX: 19.31 KG/M2 | HEIGHT: 69 IN | HEART RATE: 64 BPM

## 2019-10-10 DIAGNOSIS — I47.10 PAROXYSMAL SUPRAVENTRICULAR TACHYCARDIA (H): ICD-10-CM

## 2019-10-10 PROCEDURE — 99214 OFFICE O/P EST MOD 30 MIN: CPT | Performed by: PHYSICIAN ASSISTANT

## 2019-10-10 PROCEDURE — 93000 ELECTROCARDIOGRAM COMPLETE: CPT | Performed by: PHYSICIAN ASSISTANT

## 2019-10-10 ASSESSMENT — MIFFLIN-ST. JEOR: SCORE: 1231.87

## 2019-10-10 NOTE — LETTER
10/10/2019    [facility], MD  [address] [facility] [address]    RE: Corwin Puentes       Dear Colleague,    I had the pleasure of seeing Corwin Puentes in the [facility].    HPI:   I had the pleasure of seeing Corwin when he and wife Anahy came for follow up of syncope.  This 89 year old sees Dr. Sanchez for his history of:    1. Syncope noted 7/2018 while sitting  2. Reported pAFib by EMS tracing at time of syncopal episode 7/2018. NO AFib has been seen while in hospital or on subsequent monitors. Therefore, not on AC.  3. SVT noted on event monitor 8/2019, with rates to 240 bpm. Dr. Sanchez discussed ablation, amiodarone and Diltiazem and given advanced age, they mutually agreed on Diltiazem.     Dr. Sanchez saw Corwin 10/2018 at which time he was doing well. His chronic constipation had worsened somewhat on the Diltiazem, but he otherwise thought it was working well. They reviewed a 14 day ZP while on Diltiazem 180, showing 63 episodes of SVT, with rates typically 140-160s. As there was an improvement in duration and frequency of SVT on Diltiazem, no changes were made.    Interval History:  Feels like things are going well. Diltiazem has worked well to control rapid HR/palpitations.  Chronic constipation remains under good control.  He's had no lightheadedness, dizziness, syncope, CP, SOB or change in exercise tolerance.    BPs at home are typically 110-120s/70s with HR 70s.    Was at [place] for routine f/u and reviewed his studies done in [place] showing osteoporosis. Now on Prolia.    Diagnostic Testing:  EKG today, which I overread, showed SR with arrhythmia. PVCs x 2 noted  Echocardiogam 7/2018 showed EF 55-60% with grade I diastolic dysfunction. Normal RV size/function. Mild JON. 1+ MR. Trace TR with RVSP 24.2 mmHg+RAP. Aortic sclerosis.       Assessment & Plan:    1. Syncope 7/2018    Has not recurred    Based on ZioPatch, felt to be related to tachycardia (has both narrow  and wide QRS tachycardias noted)    Tolerating Diltiazem 180 mg daily without difficulty    PLAN:    Continue Diltiazem 180 mg daily    Monitor carefully for any syncope, lightheadedness, dizziness or change and contact us. We can always adjust dose/repeat testing if needed    See us 1 year      2. PVCs    Noted multifocal PVCs on EKG today    Echo 2018 with normal EF. Echo stress ([place]) 2015 reportedly unremarkable    PLAN:    Asx'c. No CP or concerning ischemic changes    Continue to monitor    Maisha Barnes PA-C, MSPAS      Orders Placed This Encounter   Procedures     Follow-Up with Electrophysiologist     EKG 12-lead complete w/read - Clinics (performed today)     EKG 12-lead complete w/read - Clinics (to be scheduled)     No orders of the defined types were placed in this encounter.    There are no discontinued medications.      Encounter Diagnosis   Name Primary?     Paroxysmal supraventricular tachycardia (H)        CURRENT MEDICATIONS:  Current Outpatient Medications   Medication Sig Dispense Refill     aspirin  MG EC tablet Take 1 tablet (325 mg) by mouth daily 40 tablet 1     Atorvastatin Calcium (LIPITOR PO) Take 10 mg by mouth daily        betamethasone dipropionate (DIPROSONE) 0.05 % cream Apply topically 2 times daily as needed        cholecalciferol (VITAMIN D) 400 UNITS tablet Take 400 Units by mouth daily       diltiazem (DILACOR XR) 180 MG 24 hr capsule Take 1 capsule (180 mg) by mouth daily 30 capsule 11     DiphenhydrAMINE HCl (BENADRYL PO) Take 25 mg by mouth daily as needed       hydrocortisone 1 % CREA cream Apply topically 2 times daily as needed for itching       loratadine (CLARITIN) 10 MG tablet Take 10 mg by mouth daily       LORazepam (ATIVAN PO) Take 0.25 mg by mouth daily as needed        metroNIDAZOLE (METROGEL) 1 % gel Apply topically daily as needed        Multiple Vitamins-Minerals (PRESERVISION AREDS PO) Take 1 capsule by mouth 2 times daily       polyethylene glycol 0.4%-  propylene glycol 0.3% (SYSTANE ULTRA) 0.4-0.3 % SOLN ophthalmic solution Place 1 drop into both eyes 2 times daily       saline 0.9 % AERS Spray 1 spray in nostril 2 times daily          ALLERGIES     Allergies   Allergen Reactions     Alendronic Acid      Mold      Peanut-Derived      Pollen Extract      Risedronic Acid [Risedronate]        PAST MEDICAL HISTORY:  Past Medical History:   Diagnosis Date     Atrial fibrillation (H)      Hypertension        PAST SURGICAL HISTORY:  Past Surgical History:   Procedure Laterality Date     ORTHOPEDIC SURGERY      left ankle fusion     ORTHOPEDIC SURGERY      right knee replacement        FAMILY HISTORY:  History reviewed. No pertinent family history.    SOCIAL HISTORY:  Social History     Socioeconomic History     Marital status:      Spouse name: None     Number of children: None     Years of education: None     Highest education level: None   Occupational History     None   Social Needs     Financial resource strain: None     Food insecurity:     Worry: None     Inability: None     Transportation needs:     Medical: None     Non-medical: None   Tobacco Use     Smoking status: Never Smoker     Smokeless tobacco: Never Used   Substance and Sexual Activity     Alcohol use: Yes     Comment: one shot a day      Drug use: No     Sexual activity: Never   Lifestyle     Physical activity:     Days per week: None     Minutes per session: None     Stress: None   Relationships     Social connections:     Talks on phone: None     Gets together: None     Attends Roman Catholic service: None     Active member of club or organization: None     Attends meetings of clubs or organizations: None     Relationship status: None     Intimate partner violence:     Fear of current or ex partner: None     Emotionally abused: None     Physically abused: None     Forced sexual activity: None   Other Topics Concern     Parent/sibling w/ CABG, MI or angioplasty before 65F 55M? Not Asked   Social  "History Narrative     None       Review of Systems:  Skin:  Negative     Eyes:  Negative    ENT:  Negative    Respiratory:  Negative for shortness of breath;dyspnea on exertion;cough  Cardiovascular:  Negative for;palpitations    Gastroenterology: Positive for constipation  Genitourinary:  Negative    Musculoskeletal:  Negative    Neurologic:  Negative    Psychiatric:  Negative    Heme/Lymph/Imm:  Negative    Endocrine:  Negative      Physical Exam:  Vitals: /76   Pulse 64   Ht 1.753 m (5' 9\")   Wt 59.1 kg (130 lb 6.4 oz)   BMI 19.26 kg/m       Constitutional:  cooperative, alert and oriented, well developed, well nourished, in no acute distress        Skin:  warm and dry to the touch, no apparent skin lesions or masses noted        Head:  normocephalic, no masses or lesions        Eyes:  pupils equal and round;conjunctivae and lids unremarkable;sclera white        ENT:  no pallor or cyanosis, dentition good        Neck:  no carotid bruit;no stiffness        Chest:  normal breath sounds, clear to auscultation, normal A-P diameter, normal symmetry, normal respiratory excursion, no use of accessory muscles        Cardiac: regular rhythm       systolic ejection murmur          Abdomen:  abdomen soft        Vascular: pulses full and equal                                      Extremities and Back:  no edema;no deformities, clubbing, cyanosis, erythema observed        Neurological:  no gross motor deficits          Recent Lab Results:  LIPID RESULTS:  Lab Results   Component Value Date    CHOL 87 08/13/2017    HDL 45 08/13/2017    LDL 35 08/13/2017    TRIG 33 08/13/2017       LIVER ENZYME RESULTS:  No results found for: AST, ALT    CBC RESULTS:  Lab Results   Component Value Date    WBC 7.1 07/24/2018    RBC 3.57 (L) 07/24/2018    HGB 12.3 (L) 07/24/2018    HCT 35.3 (L) 07/24/2018    MCV 99 07/24/2018    MCH 34.5 (H) 07/24/2018    MCHC 34.8 07/24/2018    RDW 14.2 07/24/2018     07/24/2018       BMP " RESULTS:  Lab Results   Component Value Date     (L) 07/24/2018    POTASSIUM 4.0 07/24/2018    CHLORIDE 99 07/24/2018    CO2 27 07/24/2018    ANIONGAP 6 07/24/2018    GLC 95 07/24/2018    BUN 14 07/24/2018    CR 0.77 07/24/2018    GFRESTIMATED >90 07/24/2018    GFRESTBLACK >90 07/24/2018    VIRGEN 8.2 (L) 07/24/2018                Thank you for allowing me to participate in the care of your patient.      Sincerely,     Connie Barnes PA-C     [facility]

## 2019-10-10 NOTE — PATIENT INSTRUCTIONS
1. EKG today showed normal rhythm with some occasional PVC (early beats from the bottom of the heart).  This is OK as you're not feeling them and you have a good strong heart based on echo last year    2. No changes today.  Continue the Diltiazem 180 mg daily    3. See us back in 1 year but CALL if any issues beforehand!  [phone]   Watch especially for any dizziness, lightheadedness, fast heart rates, ANY fainting - we can always repeat monitors or change medications before we see you next

## 2020-08-23 ENCOUNTER — HOSPITAL ENCOUNTER (EMERGENCY)
Facility: CLINIC | Age: 85
Discharge: HOME | End: 2020-08-23
Attending: EMERGENCY MEDICINE | Admitting: EMERGENCY MEDICINE
Payer: MEDICARE

## 2020-08-23 VITALS
HEART RATE: 92 BPM | SYSTOLIC BLOOD PRESSURE: 142 MMHG | BODY MASS INDEX: 18.46 KG/M2 | DIASTOLIC BLOOD PRESSURE: 82 MMHG | TEMPERATURE: 97.4 F | OXYGEN SATURATION: 96 % | WEIGHT: 125 LBS | RESPIRATION RATE: 18 BRPM

## 2020-08-23 DIAGNOSIS — K59.00 CONSTIPATION, UNSPECIFIED CONSTIPATION TYPE: ICD-10-CM

## 2020-08-23 PROCEDURE — 25000132 ZZH RX MED GY IP 250 OP 250 PS 637: Mod: GY

## 2020-08-23 PROCEDURE — 99283 EMERGENCY DEPT VISIT LOW MDM: CPT

## 2020-08-23 PROCEDURE — 25000132 ZZH RX MED GY IP 250 OP 250 PS 637: Mod: GY | Performed by: EMERGENCY MEDICINE

## 2020-08-23 PROCEDURE — 25000125 ZZHC RX 250: Performed by: EMERGENCY MEDICINE

## 2020-08-23 RX ORDER — ACETAMINOPHEN 325 MG/1
TABLET ORAL
Status: COMPLETED
Start: 2020-08-23 | End: 2020-08-23

## 2020-08-23 RX ORDER — ACETAMINOPHEN 325 MG/1
650 TABLET ORAL ONCE
Status: COMPLETED | OUTPATIENT
Start: 2020-08-23 | End: 2020-08-23

## 2020-08-23 RX ORDER — LIDOCAINE HYDROCHLORIDE 20 MG/ML
10 JELLY TOPICAL ONCE
Status: COMPLETED | OUTPATIENT
Start: 2020-08-23 | End: 2020-08-23

## 2020-08-23 RX ADMIN — DOCUSATE SODIUM 286 ML: 50 LIQUID ORAL at 17:03

## 2020-08-23 RX ADMIN — LIDOCAINE HYDROCHLORIDE 10 ML: 20 JELLY TOPICAL at 17:03

## 2020-08-23 RX ADMIN — ACETAMINOPHEN 650 MG: 325 TABLET ORAL at 18:30

## 2020-08-23 RX ADMIN — ACETAMINOPHEN 650 MG: 325 TABLET, FILM COATED ORAL at 18:30

## 2020-08-23 ASSESSMENT — ENCOUNTER SYMPTOMS
ABDOMINAL PAIN: 0
FEVER: 0
VOMITING: 0
LIGHT-HEADEDNESS: 0
CONSTIPATION: 1

## 2020-08-23 NOTE — ED PROVIDER NOTES
History     Chief Complaint:  Dark Stools; Constipation       The history is provided by the patient.      Corwin Puentes is a 89 year old male with a history of atrial fibrillation, hypertension and hyperlipidemia who presents with his son for evaluation of decreased bowel movements and dark stools for the past three days. The patient states that he has felt constipated and has only been able to pass very small and thing dark stools during this time. He has treated with Dulcolax and milk of magnesia with no relief. He states that he has had similar constipation episodes before and that he sometimes needs to take additional milk of magnesia, however is concerned due to the dark color of the stool. He denies any fever, vomiting, abdominal pain or lightheadedness. Notes that he has an aortic aneurysm and is scheduled to be seen at [place] for this in 4-5 days. He presents today concerned for the constipation and dark stools.     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 No pertinent family history.    Social History:  The patient presents to the ED with his son.  Smoking Status: Never Smoker  Smokeless Tobacco: Never Used  Alcohol Use: Yes, one shot a day  Drug Use: No  PCP: [facility]     Review of Systems   Constitutional: Negative for fever.   Gastrointestinal: Positive for constipation. Negative for abdominal pain and vomiting.        (+) Dark stools   Neurological:  Negative for light-headedness.   All other systems reviewed and are negative.    Physical Exam     Patient Vitals for the past 24 hrs:   BP Temp Temp src Pulse Resp SpO2 Weight   08/23/20 1912 (!) 142/82 -- -- 92 18 -- --   08/23/20 1615 (!) 148/85 97.4  F (36.3  C) Temporal 100 16 96 % 56.7 kg (125 lb)       Physical Exam  Nursing note and vitals reviewed.  Constitutional:  Appears well-developed and well-nourished.   HENT:   Head:    Atraumatic.   Mouth/Throat:   Oropharynx is clear and moist. No oropharyngeal exudate.   Eyes:    Pupils are equal, round, and reactive to light.   Neck:    Normal range of motion. Neck supple.      No tracheal deviation present. No thyromegaly present.   Cardiovascular:  Normal rate, regular rhythm, no murmur   Pulmonary/Chest: Breath sounds are clear and equal without wheezes or crackles.  Abdominal:   Soft. Bowel sounds are normal. Exhibits no distension and      no mass. There is no tenderness.      There is no rebound and no guarding.   Rectal:   There is a moderate amount of carter like stool in the upper portion of the rectal vault which I was able to palpate but not remove. Brown stool.  Musculoskeletal:  Exhibits no edema.   Lymphadenopathy:  No cervical adenopathy.   Neurological:   Alert and oriented to person, place, and time.   Skin:    Skin is warm and dry. No rash noted. No pallor.     Emergency Department Course     Interventions:  1703 Pink Lady Enema 286mL Rectal  1703 Lidocaine 2% 10mL Topical  1830 Tylenol 650mg PO    Emergency Department Course:  Past medical records, nursing notes, and vitals reviewed.    1643 I performed an exam of the patient as documented above.     1802 I rechecked the patient who has had little relief.    1858 I rechecked the patient who again has had slight relief. At this point I feel that the patient is safe for discharge with oral laxatives for home, and the patient and his son agree.     Findings and plan explained to the patient. Patient  discharged home with instructions regarding supportive care, medications, and reasons to return. The importance of close follow-up was reviewed. The patient was prescribed magnesium citrate.     I personally answered all related questions prior to discharge.     Impression & Plan     Medical Decision Making:  Corwin Puentes is a 89 year old male who presents with constipation with fecal impaction of stool which I cannot remove during my digital rectal exam. He has a benign abdominal exam and I did not note anything more serious on his exam. I did not feel that there was any other intraabdominal infectious or rupture of his aneurysm. His stool is brown and I did not feel that there was concern for rectal bleeding. I felt that he could be safely discharged to home and he was instructed on using magnesium citrate every 12 hours for two doses at home as needed and continuing his stool softeners. We attempted pink lady enema and tap water enemas here without any significant results of stool. He was instructed on signs and symptoms to return such as abdominal pain, vomiting or fever.     Diagnosis:    ICD-10-CM    1. Constipation, unspecified constipation type  K59.00        Disposition:  Discharged to home.    Discharge Medications:  Discharge Medication List as of 8/23/2020  7:10 PM      START taking these medications    Details   magnesium citrate solution Take 148 mLs by mouth every 12 hours as needed for constipation or other, Disp-296 mL,R-0, E-Prescribe             Scribe Disclosure:  I, Mohit Koehler, am serving as a scribe at 4:39 PM on 8/23/2020 to document services personally performed by Devorah Valdez MD based on my observations and the provider's statements to me.      Devorah Valdez MD  08/23/20 [id number]

## 2021-10-28 ENCOUNTER — OFFICE VISIT (OUTPATIENT)
Dept: CARDIOLOGY | Facility: CLINIC | Age: 86
End: 2021-10-28
Payer: MEDICARE

## 2021-10-28 VITALS
WEIGHT: 132.6 LBS | HEIGHT: 69 IN | SYSTOLIC BLOOD PRESSURE: 120 MMHG | DIASTOLIC BLOOD PRESSURE: 70 MMHG | HEART RATE: 84 BPM | BODY MASS INDEX: 19.64 KG/M2

## 2021-10-28 DIAGNOSIS — I71.40 AAA (ABDOMINAL AORTIC ANEURYSM) WITHOUT RUPTURE (H): ICD-10-CM

## 2021-10-28 DIAGNOSIS — I49.1 PAC (PREMATURE ATRIAL CONTRACTION): ICD-10-CM

## 2021-10-28 DIAGNOSIS — I47.10 SVT (SUPRAVENTRICULAR TACHYCARDIA) (H): Primary | ICD-10-CM

## 2021-10-28 PROCEDURE — 93000 ELECTROCARDIOGRAM COMPLETE: CPT | Performed by: INTERNAL MEDICINE

## 2021-10-28 PROCEDURE — 99214 OFFICE O/P EST MOD 30 MIN: CPT | Performed by: INTERNAL MEDICINE

## 2021-10-28 ASSESSMENT — MIFFLIN-ST. JEOR: SCORE: 1231.85

## 2021-10-28 NOTE — LETTER
10/28/2021    [place] MD Florina  [address] [facility] [address]    RE: Corwin Puentes       Dear Colleague,    I had the pleasure of seeing Corwin Puentes in the [facility].    HPI and Plan:   See dictation [id number]    Orders Placed This Encounter   Procedures     Follow-Up with Cardiac Advanced Practice Provider     EKG 12-lead complete w/read - Clinics (performed today)       No orders of the defined types were placed in this encounter.      There are no discontinued medications.      Encounter Diagnoses   Name Primary?     PAC (premature atrial contraction)      SVT (supraventricular tachycardia) (H) Yes       CURRENT MEDICATIONS:  Current Outpatient Medications   Medication Sig Dispense Refill     Aspirin 81 MG CAPS Take 81 mg by mouth daily  40 tablet 1     Atorvastatin Calcium (LIPITOR PO) Take 10 mg by mouth daily        betamethasone dipropionate (DIPROSONE) 0.05 % cream Apply topically 2 times daily as needed        cholecalciferol (VITAMIN D) 400 UNITS tablet Take 400 Units by mouth daily       diltiazem (DILACOR XR) 180 MG 24 hr capsule Take 1 capsule (180 mg) by mouth daily 30 capsule 11     DiphenhydrAMINE HCl (BENADRYL PO) Take 25 mg by mouth daily as needed       hydrocortisone 1 % CREA cream Apply topically 2 times daily as needed for itching       loratadine (CLARITIN) 10 MG tablet Take 10 mg by mouth daily       LORazepam (ATIVAN PO) Take 0.25 mg by mouth daily as needed        magnesium citrate solution Take 148 mLs by mouth every 12 hours as needed for constipation or other 296 mL 0     metroNIDAZOLE (METROGEL) 1 % gel Apply topically daily as needed        Multiple Vitamins-Minerals (PRESERVISION AREDS PO) Take 1 capsule by mouth 2 times daily       polyethylene glycol 0.4%- propylene glycol 0.3% (SYSTANE ULTRA) 0.4-0.3 % SOLN ophthalmic solution Place 1 drop into both eyes 2 times daily       saline 0.9 % AERS Spray 1 spray in  nostril 2 times daily          ALLERGIES     Allergies   Allergen Reactions     Alendronic Acid      Mold      Peanut-Derived      Pollen Extract      Risedronic Acid [Risedronate]        PAST MEDICAL HISTORY:  Past Medical History:   Diagnosis Date     Atrial fibrillation (H)      Hypertension        PAST SURGICAL HISTORY:  Past Surgical History:   Procedure Laterality Date     ORTHOPEDIC SURGERY      left ankle fusion     ORTHOPEDIC SURGERY      right knee replacement        FAMILY HISTORY:  No family history on file.    SOCIAL HISTORY:  Social History     Socioeconomic History     Marital status:      Spouse name: None     Number of children: None     Years of education: None     Highest education level: None   Occupational History     None   Tobacco Use     Smoking status: Never Smoker     Smokeless tobacco: Never Used   Substance and Sexual Activity     Alcohol use: Yes     Comment: one shot a day      Drug use: No     Sexual activity: Never   Other Topics Concern     Parent/sibling w/ CABG, MI or angioplasty before 65F 55M? Not Asked   Social History Narrative     None     Social Determinants of Health     Financial Resource Strain:      Difficulty of Paying Living Expenses:    Food Insecurity:      Worried About Running Out of Food in the Last Year:      Ran Out of Food in the Last Year:    Transportation Needs:      Lack of Transportation (Medical):      Lack of Transportation (Non-Medical):    Physical Activity:      Days of Exercise per Week:      Minutes of Exercise per Session:    Stress:      Feeling of Stress :    Social Connections:      Frequency of Communication with Friends and Family:      Frequency of Social Gatherings with Friends and Family:      Attends Bahai Services:      Active Member of Clubs or Organizations:      Attends Club or Organization Meetings:      Marital Status:    Intimate Partner Violence:      Fear of Current or Ex-Partner:      Emotionally Abused:      Physically  Abused:      Sexually Abused:        Review of Systems:  Skin:  Negative       Eyes:  Positive for glasses    ENT:  Negative      Respiratory:  Negative       Cardiovascular:  Negative;palpitations;chest pain;lightheadedness;dizziness;syncope or near-syncope;cyanosis;fatigue;edema      Gastroenterology: Negative      Genitourinary:  Positive for urinary frequency    Musculoskeletal:  Positive for joint stiffness    Neurologic:  Positive for stroke    Psychiatric:  Negative      Heme/Lymph/Imm:  Positive for easy bruising    Endocrine:  Negative                    Thank you for allowing me to participate in the care of your patient.      Sincerely,     Ken Sanchez MD     [facility]  cc:   No referring provider defined for this encounter.

## 2021-10-28 NOTE — PROGRESS NOTES
Service Date: 10/28/2021    HISTORY OF PRESENT ILLNESS:    I had the pleasure of seeing . Corwin Puentes, a delightful 89-year-old male, in followup of SVT.    Corwin has the following chronic medical issues:  A.  SVT noted on event monitor, rates up to 240 beats per minute.  The patient has been managed with diltiazem over the past 2-3 years.  No symptomatic recurrences.  Only had nonsustained SVT on a recent cardiac monitor.  B.  Syncope in 07/2018 while sitting.  C.  AAA 5.4 x 4.2 cm.  Last checked by CTA at [facility] in 05/2021.  He sees Vascular at [place].  VIKKI ECKERT.    Corwin has been feeling well.  In 02/2020, he fell and suffered a femur fracture that required surgical intervention.  He lives independently in a senior building with his wife.  He walks daily.  He does not have chest pain.  He has not had tachycardia, orthopnea, PND or lower extremity edema.    He came to clinic today accompanied by his son, SHEBA.      PHYSICAL EXAMINATION:    VITAL SIGNS:  Blood pressure 120/70, heart rate 84 and regular.  Weight 60 kg, height 175 cm.  GENERAL:  He is a delightful gentleman who is mentally very sharp and provides excellent history.  NECK:  Supple without carotid bruits.  LUNGS:  Clear.  CARDIOVASCULAR:  Regular rhythm with occasional ectopics.  EXTREMITIES:  No significant edema.      DIAGNOSTIC STUDIES:    - His 12-lead ECG today showed sinus rhythm with PACs.  - I reviewed his CTA report for [facility] from 05/2021.  I also reviewed clinic notes from Vascular from [facility].  - Recent labs:  Sodium 132, potassium 4.0, creatinine 0.77.      IMPRESSION:    1.  History of SVT.  No symptomatic recurrence on diltiazem.  Only side-effect is constipation, though the patient can work around it to a large extent.  Obviously continue diltiazem as his SVT is very fast and symptomatic.  2.  History of syncope.  This led to hospitalization in 07/2018.  We initially suspected bradycardia and stopped his metoprolol, but  once SVT was documented, we started diltiazem.  We never documented bradycardia in [place].  I think it is possible that his syncope was related to very fast SVT, in his case the rate exceeds 200 bpm.  3.  AAA is followed at the [facility].  If this enlarges further, hopefully it can be repaired endovascularly.  Open abdominal surgery in a 89-year-old is not a great option.    RECOMMENDATIONS:    A.  Continue current medications.  B.  See Maisha in the clinic in 18 months.    I appreciate the opportunity to be part of his care.      Ken Sanchez MD, [facility]        D: 10/28/2021   T: 10/28/2021   MT: [email or web address]    Name:     SANT[place] DAS  MRN:      [id number]        Account:      [id number]   :      1927           Service Date: 10/28/2021       Document: [id number]

## 2021-10-28 NOTE — PROGRESS NOTES
HPI and Plan:   See dictation [id number]    Orders Placed This Encounter   Procedures     Follow-Up with Cardiac Advanced Practice Provider     EKG 12-lead complete w/read - Clinics (performed today)       No orders of the defined types were placed in this encounter.      There are no discontinued medications.      Encounter Diagnoses   Name Primary?     PAC (premature atrial contraction)      SVT (supraventricular tachycardia) (H) Yes       CURRENT MEDICATIONS:  Current Outpatient Medications   Medication Sig Dispense Refill     Aspirin 81 MG CAPS Take 81 mg by mouth daily  40 tablet 1     Atorvastatin Calcium (LIPITOR PO) Take 10 mg by mouth daily        betamethasone dipropionate (DIPROSONE) 0.05 % cream Apply topically 2 times daily as needed        cholecalciferol (VITAMIN D) 400 UNITS tablet Take 400 Units by mouth daily       diltiazem (DILACOR XR) 180 MG 24 hr capsule Take 1 capsule (180 mg) by mouth daily 30 capsule 11     DiphenhydrAMINE HCl (BENADRYL PO) Take 25 mg by mouth daily as needed       hydrocortisone 1 % CREA cream Apply topically 2 times daily as needed for itching       loratadine (CLARITIN) 10 MG tablet Take 10 mg by mouth daily       LORazepam (ATIVAN PO) Take 0.25 mg by mouth daily as needed        magnesium citrate solution Take 148 mLs by mouth every 12 hours as needed for constipation or other 296 mL 0     metroNIDAZOLE (METROGEL) 1 % gel Apply topically daily as needed        Multiple Vitamins-Minerals (PRESERVISION AREDS PO) Take 1 capsule by mouth 2 times daily       polyethylene glycol 0.4%- propylene glycol 0.3% (SYSTANE ULTRA) 0.4-0.3 % SOLN ophthalmic solution Place 1 drop into both eyes 2 times daily       saline 0.9 % AERS Spray 1 spray in nostril 2 times daily          ALLERGIES     Allergies   Allergen Reactions     Alendronic Acid      Mold      Peanut-Derived      Pollen Extract      Risedronic Acid [Risedronate]        PAST MEDICAL HISTORY:  Past Medical History:   Diagnosis  Date     Atrial fibrillation (H)      Hypertension        PAST SURGICAL HISTORY:  Past Surgical History:   Procedure Laterality Date     ORTHOPEDIC SURGERY      left ankle fusion     ORTHOPEDIC SURGERY      right knee replacement        FAMILY HISTORY:  No family history on file.    SOCIAL HISTORY:  Social History     Socioeconomic History     Marital status:      Spouse name: None     Number of children: None     Years of education: None     Highest education level: None   Occupational History     None   Tobacco Use     Smoking status: Never Smoker     Smokeless tobacco: Never Used   Substance and Sexual Activity     Alcohol use: Yes     Comment: one shot a day      Drug use: No     Sexual activity: Never   Other Topics Concern     Parent/sibling w/ CABG, MI or angioplasty before 65F 55M? Not Asked   Social History Narrative     None     Social Determinants of Health     Financial Resource Strain:      Difficulty of Paying Living Expenses:    Food Insecurity:      Worried About Running Out of Food in the Last Year:      Ran Out of Food in the Last Year:    Transportation Needs:      Lack of Transportation (Medical):      Lack of Transportation (Non-Medical):    Physical Activity:      Days of Exercise per Week:      Minutes of Exercise per Session:    Stress:      Feeling of Stress :    Social Connections:      Frequency of Communication with Friends and Family:      Frequency of Social Gatherings with Friends and Family:      Attends Yazidism Services:      Active Member of Clubs or Organizations:      Attends Club or Organization Meetings:      Marital Status:    Intimate Partner Violence:      Fear of Current or Ex-Partner:      Emotionally Abused:      Physically Abused:      Sexually Abused:        Review of Systems:  Skin:  Negative       Eyes:  Positive for glasses    ENT:  Negative      Respiratory:  Negative       Cardiovascular:  Negative;palpitations;chest pain;lightheadedness;dizziness;syncope  or near-syncope;cyanosis;fatigue;edema      Gastroenterology: Negative      Genitourinary:  Positive for urinary frequency    Musculoskeletal:  Positive for joint stiffness    Neurologic:  Positive for stroke    Psychiatric:  Negative      Heme/Lymph/Imm:  Positive for easy bruising    Endocrine:  Negative

## 2022-11-28 ENCOUNTER — HOSPITAL ENCOUNTER (OUTPATIENT)
Facility: CLINIC | Age: 87
Setting detail: OBSERVATION
Discharge: HOME HEALTH | End: 2022-11-29
Attending: EMERGENCY MEDICINE | Admitting: INTERNAL MEDICINE
Payer: MEDICARE

## 2022-11-28 ENCOUNTER — APPOINTMENT (OUTPATIENT)
Dept: CT IMAGING | Facility: CLINIC | Age: 87
End: 2022-11-28
Attending: STUDENT IN AN ORGANIZED HEALTH CARE EDUCATION/TRAINING PROGRAM
Payer: MEDICARE

## 2022-11-28 ENCOUNTER — APPOINTMENT (OUTPATIENT)
Dept: GENERAL RADIOLOGY | Facility: CLINIC | Age: 87
End: 2022-11-28
Attending: STUDENT IN AN ORGANIZED HEALTH CARE EDUCATION/TRAINING PROGRAM
Payer: MEDICARE

## 2022-11-28 DIAGNOSIS — M62.81 GENERALIZED MUSCLE WEAKNESS: Primary | ICD-10-CM
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
## 2022-11-28 LAB
ALBUMIN UR-MCNC: 20 MG/DL
ANION GAP SERPL CALCULATED.3IONS-SCNC: 3 MMOL/L (ref 3–14)
APPEARANCE UR: CLEAR
ATRIAL RATE - MUSE: NORMAL BPM
BASOPHILS # BLD AUTO: 0 10E3/UL (ref 0–0.2)
BASOPHILS NFR BLD AUTO: 0 %
BILIRUB UR QL STRIP: NEGATIVE
BUN SERPL-MCNC: 29 MG/DL (ref 7–30)
CALCIUM SERPL-MCNC: 8.9 MG/DL (ref 8.5–10.1)
CHLORIDE BLD-SCNC: 105 MMOL/L (ref 94–109)
CO2 SERPL-SCNC: 27 MMOL/L (ref 20–32)
COLOR UR AUTO: YELLOW
CREAT SERPL-MCNC: 0.82 MG/DL (ref 0.66–1.25)
DIASTOLIC BLOOD PRESSURE - MUSE: NORMAL MMHG
EOSINOPHIL # BLD AUTO: 0.3 10E3/UL (ref 0–0.7)
EOSINOPHIL NFR BLD AUTO: 3 %
ERYTHROCYTE [DISTWIDTH] IN BLOOD BY AUTOMATED COUNT: 15.2 % (ref 10–15)
GFR SERPL CREATININE-BSD FRML MDRD: 81 ML/MIN/1.73M2
GLUCOSE BLD-MCNC: 111 MG/DL (ref 70–99)
GLUCOSE UR STRIP-MCNC: NEGATIVE MG/DL
HCT VFR BLD AUTO: 35 % (ref 40–53)
HGB BLD-MCNC: 11.9 G/DL (ref 13.3–17.7)
HGB UR QL STRIP: ABNORMAL
IMM GRANULOCYTES # BLD: 0.1 10E3/UL
IMM GRANULOCYTES NFR BLD: 1 %
INTERPRETATION ECG - MUSE: NORMAL
KETONES UR STRIP-MCNC: NEGATIVE MG/DL
LEUKOCYTE ESTERASE UR QL STRIP: NEGATIVE
LYMPHOCYTES # BLD AUTO: 0.4 10E3/UL (ref 0.8–5.3)
LYMPHOCYTES NFR BLD AUTO: 3 %
MCH RBC QN AUTO: 35.6 PG (ref 26.5–33)
MCHC RBC AUTO-ENTMCNC: 34 G/DL (ref 31.5–36.5)
MCV RBC AUTO: 105 FL (ref 78–100)
MONOCYTES # BLD AUTO: 0.9 10E3/UL (ref 0–1.3)
MONOCYTES NFR BLD AUTO: 7 %
MUCOUS THREADS #/AREA URNS LPF: PRESENT /LPF
NEUTROPHILS # BLD AUTO: 11.3 10E3/UL (ref 1.6–8.3)
NEUTROPHILS NFR BLD AUTO: 86 %
NITRATE UR QL: NEGATIVE
NRBC # BLD AUTO: 0 10E3/UL
NRBC BLD AUTO-RTO: 0 /100
P AXIS - MUSE: NORMAL DEGREES
PH UR STRIP: 6.5 [PH] (ref 5–7)
PLATELET # BLD AUTO: 228 10E3/UL (ref 150–450)
POTASSIUM BLD-SCNC: 4.3 MMOL/L (ref 3.4–5.3)
PR INTERVAL - MUSE: NORMAL MS
QRS DURATION - MUSE: 76 MS
QT - MUSE: 344 MS
QTC - MUSE: 411 MS
R AXIS - MUSE: 62 DEGREES
RBC # BLD AUTO: 3.34 10E6/UL (ref 4.4–5.9)
RBC URINE: 7 /HPF
SARS-COV-2 RNA RESP QL NAA+PROBE: NEGATIVE
SODIUM SERPL-SCNC: 135 MMOL/L (ref 133–144)
SP GR UR STRIP: 1.03 (ref 1–1.03)
SYSTOLIC BLOOD PRESSURE - MUSE: NORMAL MMHG
T AXIS - MUSE: 17 DEGREES
UROBILINOGEN UR STRIP-MCNC: NORMAL MG/DL
VENTRICULAR RATE- MUSE: 86 BPM
WBC # BLD AUTO: 13.1 10E3/UL (ref 4–11)
WBC URINE: 2 /HPF

## 2022-11-28 PROCEDURE — 36415 COLL VENOUS BLD VENIPUNCTURE: CPT | Performed by: STUDENT IN AN ORGANIZED HEALTH CARE EDUCATION/TRAINING PROGRAM

## 2022-11-28 PROCEDURE — 99220 PR INITIAL OBSERVATION CARE,LEVEL III: CPT | Performed by: INTERNAL MEDICINE

## 2022-11-28 PROCEDURE — U0003 INFECTIOUS AGENT DETECTION BY NUCLEIC ACID (DNA OR RNA); SEVERE ACUTE RESPIRATORY SYNDROME CORONAVIRUS 2 (SARS-COV-2) (CORONAVIRUS DISEASE [COVID-19]), AMPLIFIED PROBE TECHNIQUE, MAKING USE OF HIGH THROUGHPUT TECHNOLOGIES AS DESCRIBED BY CMS-2020-01-R: HCPCS | Performed by: EMERGENCY MEDICINE

## 2022-11-28 PROCEDURE — 96360 HYDRATION IV INFUSION INIT: CPT

## 2022-11-28 PROCEDURE — 81001 URINALYSIS AUTO W/SCOPE: CPT | Performed by: EMERGENCY MEDICINE

## 2022-11-28 PROCEDURE — 85014 HEMATOCRIT: CPT | Performed by: STUDENT IN AN ORGANIZED HEALTH CARE EDUCATION/TRAINING PROGRAM

## 2022-11-28 PROCEDURE — 96361 HYDRATE IV INFUSION ADD-ON: CPT

## 2022-11-28 PROCEDURE — 250N000013 HC RX MED GY IP 250 OP 250 PS 637: Performed by: STUDENT IN AN ORGANIZED HEALTH CARE EDUCATION/TRAINING PROGRAM

## 2022-11-28 PROCEDURE — 71046 X-RAY EXAM CHEST 2 VIEWS: CPT

## 2022-11-28 PROCEDURE — 93005 ELECTROCARDIOGRAM TRACING: CPT

## 2022-11-28 PROCEDURE — 80048 BASIC METABOLIC PNL TOTAL CA: CPT | Performed by: STUDENT IN AN ORGANIZED HEALTH CARE EDUCATION/TRAINING PROGRAM

## 2022-11-28 PROCEDURE — 99285 EMERGENCY DEPT VISIT HI MDM: CPT | Mod: 25

## 2022-11-28 PROCEDURE — C9803 HOPD COVID-19 SPEC COLLECT: HCPCS

## 2022-11-28 PROCEDURE — G1010 CDSM STANSON: HCPCS

## 2022-11-28 PROCEDURE — 258N000003 HC RX IP 258 OP 636: Performed by: STUDENT IN AN ORGANIZED HEALTH CARE EDUCATION/TRAINING PROGRAM

## 2022-11-28 PROCEDURE — G0378 HOSPITAL OBSERVATION PER HR: HCPCS

## 2022-11-28 PROCEDURE — 250N000013 HC RX MED GY IP 250 OP 250 PS 637: Performed by: EMERGENCY MEDICINE

## 2022-11-28 RX ORDER — ACETAMINOPHEN 650 MG/1
650 SUPPOSITORY RECTAL EVERY 6 HOURS PRN
Status: CANCELLED | OUTPATIENT
Start: 2022-11-28

## 2022-11-28 RX ORDER — ATORVASTATIN CALCIUM 20 MG/1
10 TABLET, FILM COATED ORAL DAILY
COMMUNITY

## 2022-11-28 RX ORDER — ACETAMINOPHEN 500 MG
1000 TABLET ORAL 4 TIMES DAILY
COMMUNITY

## 2022-11-28 RX ORDER — DIPHENHYDRAMINE HCL 25 MG
25 CAPSULE ORAL AT BEDTIME
COMMUNITY

## 2022-11-28 RX ORDER — LORAZEPAM 0.5 MG/1
0.5 TABLET ORAL EVERY 8 HOURS PRN
COMMUNITY

## 2022-11-28 RX ORDER — ACETAMINOPHEN 325 MG/1
650 TABLET ORAL EVERY 6 HOURS PRN
Status: CANCELLED | OUTPATIENT
Start: 2022-11-28

## 2022-11-28 RX ORDER — ACETAMINOPHEN 500 MG
500 TABLET ORAL ONCE
Status: DISCONTINUED | OUTPATIENT
Start: 2022-11-28 | End: 2022-11-29

## 2022-11-28 RX ORDER — ACETAMINOPHEN 325 MG/1
650 TABLET ORAL ONCE
Status: COMPLETED | OUTPATIENT
Start: 2022-11-28 | End: 2022-11-28

## 2022-11-28 RX ORDER — CARBOXYMETHYLCELLULOSE SODIUM 5 MG/ML
1 SOLUTION/ DROPS OPHTHALMIC 2 TIMES DAILY PRN
COMMUNITY

## 2022-11-28 RX ADMIN — SODIUM CHLORIDE 1000 ML: 9 INJECTION, SOLUTION INTRAVENOUS at 16:57

## 2022-11-28 RX ADMIN — ACETAMINOPHEN 650 MG: 325 TABLET, FILM COATED ORAL at 15:34

## 2022-11-28 RX ADMIN — ACETAMINOPHEN 650 MG: 325 TABLET, FILM COATED ORAL at 19:54

## 2022-11-28 ASSESSMENT — ENCOUNTER SYMPTOMS
SORE THROAT: 0
FEVER: 0
ABDOMINAL PAIN: 0
NAUSEA: 0
BACK PAIN: 1
RHINORRHEA: 0
HEADACHES: 0
WEAKNESS: 1
COUGH: 0
SHORTNESS OF BREATH: 0
VOMITING: 0
CHILLS: 0

## 2022-11-28 ASSESSMENT — ACTIVITIES OF DAILY LIVING (ADL)
ADLS_ACUITY_SCORE: 37

## 2022-11-28 NOTE — ED PROVIDER NOTES
History     Chief Complaint:  Fall and Fatigue       HPI   Corwin Puentes is a 89 year old male with past medical history including CVA and osteoporosis, who presents with fall and generalized weakness.  He is not anticoagulated.  Patient reports that he is been dealing with constipation and generalized weakness over the past few days.  He took some milk of magnesia last night, and this morning, he was in the bathroom on 3 separate occasions.  He states that on the last of his occasions, when he tried to stand up, he felt shaky in the knees, and his right knee gave out, causing him to fall to his right.  He broke his fall with his right elbow, but he thinks he grazed his head on something.  No loss of consciousness.  EMS was called, and they felt that he was unstable on his feet after they got him up, prompting them to bring him to the ED for evaluation.  [facility] in independent living with his wife.  He denies chest pain, shortness of breath, cough, fevers, rhinorrhea, abdominal pain, nausea, or vomiting.  He denies blood in his stool.  He also notes chronic middle back pain.    ROS:  Review of Systems   Constitutional: Negative for chills and fever.   HENT: Negative for ear pain, rhinorrhea and sore throat.    Respiratory: Negative for cough and shortness of breath.    Gastrointestinal: Negative for abdominal pain, nausea and vomiting.   Musculoskeletal: Positive for back pain.   Neurological: Positive for weakness (generalized). Negative for syncope and headaches.   All other systems reviewed and are negative.        Allergies:  Alendronic Acid  Mold  Peanut-Derived  Pollen Extract  Risedronic Acid [Risedronate]     Medications:    Aspirin 81 MG CAPS  Atorvastatin Calcium (LIPITOR PO)  betamethasone dipropionate (DIPROSONE) 0.05 % cream  cholecalciferol (VITAMIN D) 400 UNITS tablet  diltiazem (DILACOR XR) 180 MG 24 hr capsule  DiphenhydrAMINE HCl (BENADRYL PO)  hydrocortisone 1  "% CREA cream  loratadine (CLARITIN) 10 MG tablet  LORazepam (ATIVAN PO)  magnesium citrate solution  metroNIDAZOLE (METROGEL) 1 % gel  Multiple Vitamins-Minerals (PRESERVISION AREDS PO)  polyethylene glycol 0.4%- propylene glycol 0.3% (SYSTANE ULTRA) 0.4-0.3 % SOLN ophthalmic solution  saline 0.9 % AERS        Past Medical History:    Past Medical History:   Diagnosis Date     Atrial fibrillation (H)      Hypertension        Past Surgical History:    Past Surgical History:   Procedure Laterality Date     ORTHOPEDIC SURGERY      left ankle fusion     ORTHOPEDIC SURGERY      right knee replacement         Family History:    family history is not on file.    Social History:   reports that he has never smoked. He has never used smokeless tobacco. He reports current alcohol use. He reports that he does not use drugs.  PCP: [facility] [place]     Physical Exam     Patient Vitals for the past 24 hrs:   BP Temp Temp src Pulse Resp SpO2 Height Weight   11/28/22 1704 125/77 -- -- -- -- 97 % -- --   11/28/22 1407 116/80 97.6  F (36.4  C) Oral 94 18 96 % 1.727 m (5' 8\") 59.4 kg (131 lb)        Physical Exam  Vitals: Reviewed, as above.   General: Alert and oriented, in mild distress. Resting on bed.  Skin: Warm and well-perfused.  2 cm skin tear to medio-lateral aspect of right elbow with no active bleeding.  3 cm skin tear to lateral aspect of right elbow.  HEENT:   Head: Normocephalic, atraumatic with no raccoon eyes or jang sign. Facial features symmetric.   Eyes: Conjunctiva pink, sclera white. EOMs grossly intact.   Ears: Auricles without lesion, erythema, or edema.  TMs visualized bilaterally with no hemotympanum.  Nose: Symmetric with no discharge.  Mouth and throat: Lips are moist with no chapping, lesions, or edema, Buccal mucosa is pink and moist without lesions. Oropharyngeal mucosa is pink and moist with no erythema, edema, or exudate. Uvula is midline.  Neck: Supple with no lymphadenopathy. Full ROM.   Pulmonary: Chest " wall expansion symmetric with no increased work of breathing. Lungs clear to auscultation bilaterally.   Cardiovascular: Heart irregular with no murmurs. 2+ radial and tibialis posterior pulses bilaterally. No peripheral edema.  Abdominal: No hernias or distension. Bowel sounds present and physiologic. Abdomen is soft and nontender to light and deep palpation in all 4 quadrants with no guarding or rebound. No masses or organomegaly.   Musculoskeletal: Moves all extremities spontaneously.  Full range of motion to bilateral upper and lower extremities.  Nontender to palpation of shoulders, upper arms, elbows, forearms, wrists.  Full strength.  Forage motion with no pain to bilateral lower extremities.  Nontender palpation of hips, thighs, knees, shins, or feet.  Full strength, except 0/5 strength with dorsiflexion of left foot, secondary to an ankle fusion.  Psych: Affect appropriate.  Answers questions appropriately. Patient appears calm.       Emergency Department Course   ECG:  ECG results from 11/28/22   EKG 12-lead, tracing only     Value    Systolic Blood Pressure     Diastolic Blood Pressure     Ventricular Rate 86    Atrial Rate     PA Interval     QRS Duration 76        QTc 411    P Axis     R AXIS 62    T Axis 17    Interpretation ECG      Accelerated Junctional rhythm  Abnormal ECG  When compared with ECG of 23-JUL-2018 09:26,  Junctional rhythm has replaced Sinus rhythm  ST now depressed in Anterior leads  Confirmed by GENERATED REPORT, COMPUTER ([id number]),  Shahrzad Shaver ([id number]) on 11/28/2022 4:56:11 PM         Imaging:  CT Head w/o Contrast   Final Result   IMPRESSION:   Age-related changes as above with no acute intracranial abnormality.      DELMY ROSA MD            SYSTEM ID:  [id number]      XR Chest 2 Views   Final Result   IMPRESSION: No acute cardiopulmonary disease.      BRIAN BENNETT MD            SYSTEM ID:  [id number]           Laboratory:  Labs Ordered and Resulted from Time of ED  Arrival to Time of ED Departure   BASIC METABOLIC PANEL - Abnormal       Result Value    Sodium 135      Potassium 4.3      Chloride 105      Carbon Dioxide (CO2) 27      Anion Gap 3      Urea Nitrogen 29      Creatinine 0.82      Calcium 8.9      Glucose 111 (*)     GFR Estimate 81     CBC WITH PLATELETS AND DIFFERENTIAL - Abnormal    WBC Count 13.1 (*)     RBC Count 3.34 (*)     Hemoglobin 11.9 (*)     Hematocrit 35.0 (*)      (*)     MCH 35.6 (*)     MCHC 34.0      RDW 15.2 (*)     Platelet Count 228      % Neutrophils 86      % Lymphocytes 3      % Monocytes 7      % Eosinophils 3      % Basophils 0      % Immature Granulocytes 1      NRBCs per 100 WBC 0      Absolute Neutrophils 11.3 (*)     Absolute Lymphocytes 0.4 (*)     Absolute Monocytes 0.9      Absolute Eosinophils 0.3      Absolute Basophils 0.0      Absolute Immature Granulocytes 0.1      Absolute NRBCs 0.0     ROUTINE UA WITH MICROSCOPIC REFLEX TO CULTURE - Abnormal    Color Urine Yellow      Appearance Urine Clear      Glucose Urine Negative      Bilirubin Urine Negative      Ketones Urine Negative      Specific Gravity Urine 1.034      Blood Urine Trace (*)     pH Urine 6.5      Protein Albumin Urine 20 (*)     Urobilinogen Urine Normal      Nitrite Urine Negative      Leukocyte Esterase Urine Negative      Mucus Urine Present (*)     RBC Urine 7 (*)     WBC Urine 2            Emergency Department Course:             Reviewed:  I reviewed nursing notes, vitals and past medical history    Assessments:  1430 I obtained history and examined the patient as noted above.   1700 I rechecked the patient and explained findings.   1800 I rechecked the patient and attempted to ambulate around room with him.  He required a two-person assist to get out of bed and took very small steps with his wife's walker.  He agrees that this is not his baseline mobility, and he is worried about falls.    Consults:   1825 I discussed with Dr. Ag, of the [facility]  service, regarding the patient.  He will accept the patient for admission to the hospital.    Interventions:  Medications   acetaminophen (TYLENOL) tablet 650 mg (650 mg Oral Given 11/28/22 6654)   0.9% sodium chloride BOLUS (1,000 mLs Intravenous New Bag 11/28/22 7164)        Disposition:  The patient was admitted to the hospital under the care of Dr. Ag.     Impression & Plan      Medical Decision Making:  Corwin Puentes is a 89 year old male with past medical history including CVA and osteoporosis, who presents with fall and generalized weakness.  Please see HPI and physical exam for full details.  Differential diagnosis includes generalized weakness, UTI, pneumonia, ICH, electrolyte abnormality, among others.  Patient has a largely reassuring physical exam with no bony tenderness, and he is not interested in x-rays today.  Head CT was without acute abnormality.  Chest x-ray is negative for acute pathology.  Urinalysis is negative for infection.  CBC reveals leukocytosis at 13, however this is nonspecific, and the patient has no localizing signs or symptoms.  Work-up thus far has been negative.  Patient was rehydrated with 1 liter of normal saline.  I attempted to ambulate around the room with the patient.  He required assistance to get out of bed, which he does not normally require.  He took exceedingly small steps with the walker.  He endorses that this is not his baseline mobility, and he is worried about falls.  He and his wife live alone in independent living without the possibility for increased services tonight or tomorrow.  Patient, his wife, and his son agree that the safest decision will be to have him stay overnight for observation and consideration for increased services or transitional care placement. I think this is reasonable. I discussed with Dr. Ag, of the hospitalist service, who accepts the patient for admission.     Critical Care time:  was 0 minutes for this patient excluding  procedures.    Diagnosis:    ICD-10-CM    1. Fall, initial encounter  W19.XXXA       2. Generalized muscle weakness  M62.81               Eryn Haynes PA-C  11/28/22 1828

## 2022-11-28 NOTE — ED TRIAGE NOTES
Pt presents by EMS from [facility] facility following a fall this morning. Pt reports struggling to get off the toilet/his knee buckled causing him to land on his R elbow. Pt has lac on R elbow. Pt also reports increased weakness the past few days - denies CP, SOB, fever, cough, or urinary changes.      Triage Assessment     Row Name 11/28/22 1413       Respiratory WDL    Rhythm/Pattern, Respiratory depth regular;pattern regular;unlabored    Expansion/Accessory Muscles/Retractions expansion symmetric;no retractions;no use of accessory muscles       Cardiac WDL    Cardiac WDL --  denies cp       Cognitive/Neuro/Behavioral WDL    Cognitive/Neuro/Behavioral WDL WDL

## 2022-11-28 NOTE — ED PROVIDER NOTES
ED ATTENDING PHYSICIAN NOTE:   I evaluated this patient in conjunction with Eryn Haynes PA-C  I have participated in the care of the patient and personally performed key elements of the history, exam, and medical decision making.      HPI:   Corwin Puentes is a 89 year old male who fell and is generally weak.    EXAM:   General: Well-nourished, no acute distress  Respiratory:  No respiratory distress  Skin: Skin tear as noted in PA cells exam.    Neuro: Alert and oriented to person/place/time  Psychiatric: Normal affect     ECG  ECG taken at 1611, ECG read at 1616  Accelerated junctional rhythm    No change as compared to prior, dated 07/13/18.  Rate 86 bpm. GA interval * ms. QRS duration 76 ms. QT/QTc 344/411 ms. P-R-T axes * 62 17.       MEDICAL DECISION MAKING/ASSESSMENT AND PLAN:   Corwin Puentes is a 89 year old male who comes with a fall and weakness.  Head CT shows no signs of intracranial hemorrhage.  He has a nonfocal neurologic examination.  No signs of an infectious etiology.  The etiology for his weakness in his fall is unclear.  He unfortunately failed a trial of ambulation.  We will admit him to the hospital for ongoing monitoring, OT PT and possible TCU placement if needed.     DIAGNOSIS:     ICD-10-CM    1. Fall, initial encounter  W19.XXXA       2. Generalized muscle weakness  M62.81             DISPOSITION:   Observation     Scribe Disclosure:  I, Pennie Cain, am serving as a scribe at 4:22 PM on 11/28/2022 to document services personally performed by Ronda Dinh MD based on my observations and the provider's statements to me.    11/28/2022  [facility] EMERGENCY DEPT       Ronda Dinh MD  11/28/22 1916

## 2022-11-28 NOTE — ED NOTES
Bed: ED28  Expected date:   Expected time:   Means of arrival:   Comments:  [address] 89M weak- fall

## 2022-11-29 ENCOUNTER — APPOINTMENT (OUTPATIENT)
Dept: PHYSICAL THERAPY | Facility: CLINIC | Age: 87
End: 2022-11-29
Attending: INTERNAL MEDICINE
Payer: MEDICARE

## 2022-11-29 ENCOUNTER — APPOINTMENT (OUTPATIENT)
Dept: OCCUPATIONAL THERAPY | Facility: CLINIC | Age: 87
End: 2022-11-29
Attending: INTERNAL MEDICINE
Payer: MEDICARE

## 2022-11-29 VITALS
SYSTOLIC BLOOD PRESSURE: 129 MMHG | WEIGHT: 131 LBS | HEIGHT: 68 IN | DIASTOLIC BLOOD PRESSURE: 80 MMHG | HEART RATE: 93 BPM | BODY MASS INDEX: 19.85 KG/M2 | RESPIRATION RATE: 16 BRPM | OXYGEN SATURATION: 96 % | TEMPERATURE: 98.6 F

## 2022-11-29 PROCEDURE — 250N000013 HC RX MED GY IP 250 OP 250 PS 637: Performed by: HOSPITALIST

## 2022-11-29 PROCEDURE — 97535 SELF CARE MNGMENT TRAINING: CPT | Mod: GO | Performed by: OCCUPATIONAL THERAPIST

## 2022-11-29 PROCEDURE — 97165 OT EVAL LOW COMPLEX 30 MIN: CPT | Mod: GO | Performed by: OCCUPATIONAL THERAPIST

## 2022-11-29 PROCEDURE — G0378 HOSPITAL OBSERVATION PER HR: HCPCS

## 2022-11-29 PROCEDURE — 97530 THERAPEUTIC ACTIVITIES: CPT | Mod: GP | Performed by: PHYSICAL THERAPIST

## 2022-11-29 PROCEDURE — 99217 PR OBSERVATION CARE DISCHARGE: CPT | Performed by: HOSPITALIST

## 2022-11-29 PROCEDURE — 250N000013 HC RX MED GY IP 250 OP 250 PS 637: Performed by: INTERNAL MEDICINE

## 2022-11-29 PROCEDURE — 258N000003 HC RX IP 258 OP 636: Performed by: INTERNAL MEDICINE

## 2022-11-29 PROCEDURE — 97116 GAIT TRAINING THERAPY: CPT | Mod: GP | Performed by: PHYSICAL THERAPIST

## 2022-11-29 PROCEDURE — 97161 PT EVAL LOW COMPLEX 20 MIN: CPT | Mod: GP | Performed by: PHYSICAL THERAPIST

## 2022-11-29 RX ORDER — POLYETHYLENE GLYCOL 3350 17 G/17G
17 POWDER, FOR SOLUTION ORAL DAILY PRN
Status: DISCONTINUED | OUTPATIENT
Start: 2022-11-29 | End: 2022-11-29 | Stop reason: HOSPADM

## 2022-11-29 RX ORDER — LORATADINE 10 MG/1
10 TABLET ORAL DAILY PRN
Status: DISCONTINUED | OUTPATIENT
Start: 2022-11-29 | End: 2022-11-29 | Stop reason: HOSPADM

## 2022-11-29 RX ORDER — DIPHENHYDRAMINE HCL 25 MG
25 CAPSULE ORAL AT BEDTIME
Status: DISCONTINUED | OUTPATIENT
Start: 2022-11-29 | End: 2022-11-29 | Stop reason: HOSPADM

## 2022-11-29 RX ORDER — CARBOXYMETHYLCELLULOSE SODIUM 5 MG/ML
1 SOLUTION/ DROPS OPHTHALMIC 2 TIMES DAILY PRN
Status: DISCONTINUED | OUTPATIENT
Start: 2022-11-29 | End: 2022-11-29 | Stop reason: HOSPADM

## 2022-11-29 RX ORDER — AMOXICILLIN 250 MG
1 CAPSULE ORAL 2 TIMES DAILY PRN
Status: DISCONTINUED | OUTPATIENT
Start: 2022-11-29 | End: 2022-11-29 | Stop reason: HOSPADM

## 2022-11-29 RX ORDER — SODIUM CHLORIDE 9 MG/ML
INJECTION, SOLUTION INTRAVENOUS CONTINUOUS
Status: ACTIVE | OUTPATIENT
Start: 2022-11-29 | End: 2022-11-29

## 2022-11-29 RX ORDER — ONDANSETRON 4 MG/1
4 TABLET, ORALLY DISINTEGRATING ORAL EVERY 6 HOURS PRN
Status: DISCONTINUED | OUTPATIENT
Start: 2022-11-29 | End: 2022-11-29 | Stop reason: HOSPADM

## 2022-11-29 RX ORDER — PROCHLORPERAZINE 25 MG
12.5 SUPPOSITORY, RECTAL RECTAL EVERY 12 HOURS PRN
Status: DISCONTINUED | OUTPATIENT
Start: 2022-11-29 | End: 2022-11-29 | Stop reason: HOSPADM

## 2022-11-29 RX ORDER — PROCHLORPERAZINE MALEATE 5 MG
5 TABLET ORAL EVERY 6 HOURS PRN
Status: DISCONTINUED | OUTPATIENT
Start: 2022-11-29 | End: 2022-11-29 | Stop reason: HOSPADM

## 2022-11-29 RX ORDER — AMOXICILLIN 250 MG
2 CAPSULE ORAL 2 TIMES DAILY PRN
Status: DISCONTINUED | OUTPATIENT
Start: 2022-11-29 | End: 2022-11-29 | Stop reason: HOSPADM

## 2022-11-29 RX ORDER — LORAZEPAM 0.5 MG/1
0.25 TABLET ORAL EVERY 8 HOURS PRN
Status: DISCONTINUED | OUTPATIENT
Start: 2022-11-29 | End: 2022-11-29 | Stop reason: HOSPADM

## 2022-11-29 RX ORDER — DILTIAZEM HYDROCHLORIDE 180 MG/1
180 CAPSULE, EXTENDED RELEASE ORAL DAILY
Status: DISCONTINUED | OUTPATIENT
Start: 2022-11-29 | End: 2022-11-29 | Stop reason: HOSPADM

## 2022-11-29 RX ORDER — ACETAMINOPHEN 500 MG
500 TABLET ORAL 2 TIMES DAILY PRN
Status: DISCONTINUED | OUTPATIENT
Start: 2022-11-29 | End: 2022-11-29

## 2022-11-29 RX ORDER — ONDANSETRON 2 MG/ML
4 INJECTION INTRAMUSCULAR; INTRAVENOUS EVERY 6 HOURS PRN
Status: DISCONTINUED | OUTPATIENT
Start: 2022-11-29 | End: 2022-11-29 | Stop reason: HOSPADM

## 2022-11-29 RX ORDER — ACETAMINOPHEN 500 MG
1000 TABLET ORAL 3 TIMES DAILY
Status: DISCONTINUED | OUTPATIENT
Start: 2022-11-29 | End: 2022-11-29 | Stop reason: HOSPADM

## 2022-11-29 RX ORDER — METRONIDAZOLE 10 MG/G
GEL TOPICAL DAILY PRN
Status: DISCONTINUED | OUTPATIENT
Start: 2022-11-29 | End: 2022-11-29 | Stop reason: HOSPADM

## 2022-11-29 RX ORDER — ACETAMINOPHEN 500 MG
1000 TABLET ORAL 4 TIMES DAILY
Status: DISCONTINUED | OUTPATIENT
Start: 2022-11-29 | End: 2022-11-29

## 2022-11-29 RX ADMIN — ACETAMINOPHEN 1000 MG: 500 TABLET ORAL at 08:00

## 2022-11-29 RX ADMIN — SODIUM CHLORIDE: 9 INJECTION, SOLUTION INTRAVENOUS at 00:36

## 2022-11-29 RX ADMIN — ACETAMINOPHEN 1000 MG: 500 TABLET ORAL at 14:32

## 2022-11-29 RX ADMIN — DILTIAZEM HYDROCHLORIDE 180 MG: 180 CAPSULE, COATED, EXTENDED RELEASE ORAL at 15:43

## 2022-11-29 RX ADMIN — ACETAMINOPHEN 500 MG: 500 TABLET ORAL at 00:47

## 2022-11-29 ASSESSMENT — ACTIVITIES OF DAILY LIVING (ADL)
ADLS_ACUITY_SCORE: 43
ADLS_ACUITY_SCORE: 39
PREVIOUS_RESPONSIBILITIES: MEDICATION MANAGEMENT;FINANCES
ADLS_ACUITY_SCORE: 43
ADLS_ACUITY_SCORE: 37

## 2022-11-29 NOTE — DISCHARGE INSTRUCTIONS
[facility] will contact you for Home Physical and Occupational Therapy visits.  [facility] contact information is:      [facility] [address] [address]  Office: [phone]

## 2022-11-29 NOTE — ED PROVIDER NOTES
[facility]    -Laceration Repair    Date/Time: 11/28/2022 11:13 PM  Performed by: Sofi Tuttle MD  Authorized by: Sofi Tuttle MD     Risks, benefits and alternatives discussed.      ANESTHESIA (see MAR for exact dosages):     Anesthesia method:  Local infiltration    Local anesthetic:  Bupivacaine 0.5% w/o epi  LACERATION DETAILS     Location:  Shoulder/arm    Shoulder/arm location:  R elbow    Length (cm):  2    REPAIR TYPE:     Repair type:  Simple      EXPLORATION:     Wound exploration: wound explored through full range of motion      Wound exploration comment:  Joint injected with sterile saline after prep with iodine..  No leakage of fluid from the joint.    TREATMENT:     Amount of cleaning:  Standard    Irrigation solution:  Tap water    Irrigation volume:  150cc    Irrigation method:  Syringe    SKIN REPAIR     Repair method:  Sutures    Suture size:  5-0    Suture material:  Nylon    Suture technique:  Simple interrupted    Number of sutures:  3    APPROXIMATION     Approximation:  Close    POST-PROCEDURE DETAILS     Dressing:  Non-adherent dressing and sterile dressing        PROCEDURE    Patient Tolerance:  Patient tolerated the procedure well with no immediate complications         Sofi Tuttle MD  11/28/22 [id number]

## 2022-11-29 NOTE — H&P
Admitted: 11/28/2022    PRIMARY CARE PROVIDER:  At the [facility].    CHIEF COMPLAINT:  Fall with weakness.    HISTORY OF PRESENT ILLNESS:  Mr. Puentes is a 89-year-old male patient with history noted below including hypertension, atrial fibrillation, stroke, abdominal aortic aneurysm, hyperlipidemia, depression/anxiety, and BPH, who presents with the above issues.  The patient lives in an independent living facility with his wife.  Typically uses a walker for ambulation.  Today the patient was ambulating in his apartment and suffered a mechanical fall.  He did not suffer a serious injury, but was very weak.  He was subsequently brought to [facility] for further evaluation.  The patient's son notes that over the past he did strain his back a few days ago and this has limited his mobility and contributing to the fall today.    The patient was seen in the ER by Eryn Haynes PA-C.  On initial evaluation he had vital signs that showed temperature 97.6, heart rate 94, blood pressure 125/77, respiration 18, O2 saturation 97% on room air.  He had laboratory evaluation that showed a BMP which was normal; CBC showed white count 13.1, hemoglobin 11.9, and platelets 228.  Urinalysis did not suggest infection.  He had x-rays of the chest, which were negative for acute process.  Head CT did not show any acute findings.  He had an EKG that showed sinus rhythm without acute ischemic changes.  They tried to ambulate the patient but he is quite weak and it was felt unsafe for him to go back home today.  As such, request for observation admission was made.      The patient has chest pain and shortness of breath.  No fevers or chills.  No abdominal pain, bloody stools, nausea or vomiting.    PAST MEDICAL HISTORY:  1.  Hypertension.  2.  Abdominal aortic aneurysm.  This has been measuring the low 5 cm range and they are monitoring this.  3.  Atrial fibrillation.  Has seen Dr. Sanchez in the past.  Anticoagulation was not recommended in his  clinical situation.  4.  Stroke history.  Noted to be left occipital in 2017.  5.  Hyperlipidemia.  6.  Depression/anxiety.  7.  Allergic rhinitis.  8.  BPH.  9.  Osteoarthritis.  10.  Osteoporosis.  11.  Rosacea.  12.  History of squamous cell skin cancer on his nose, status post Mohs procedure in 2014.   13.  History of thrombocytopenia.      PAST SURGICAL HISTORY:   Past Surgical History:   Procedure Laterality Date     ORTHOPEDIC SURGERY      left ankle fusion     ORTHOPEDIC SURGERY      right knee replacement        ALLERGIES:  Alendronic acid, Mold, Peanut-derived, Pollen extract, and Risedronic acid [risedronate]      HOME MEDICATIONS:    Prior to Admission Medications   Prescriptions Last Dose Informant Patient Reported? Taking?   Cholecalciferol (VITAMIN D3 PO) 11/27/2022 at midday Self Yes Yes   Sig: Take 1 tablet by mouth daily Unknown tablet strength   LORazepam (ATIVAN) 0.5 MG tablet 11/27/2022 at PM Self Yes Yes   Sig: Take 0.5 mg by mouth every 8 hours as needed for anxiety   Multiple Vitamins-Minerals (PRESERVISION AREDS PO) 11/27/2022 at noon Self Yes Yes   Sig: Take 1 capsule by mouth daily   acetaminophen (TYLENOL) 500 MG tablet 11/28/2022 at 0800 Self Yes Yes   Sig: Take 1,000 mg by mouth 4 times daily   atorvastatin (LIPITOR) 20 MG tablet 11/27/2022 at PM Self Yes Yes   Sig: Take 10 mg by mouth daily   carboxymethylcellulose PF (REFRESH PLUS) 0.5 % ophthalmic solution 11/27/2022 at uses most every day Self Yes Yes   Sig: Place 1 drop into both eyes 2 times daily as needed for dry eyes   diclofenac (VOLTAREN) 1 % topical gel Unknown at PRN, rarely uses but has home supply Self Yes Yes   Sig: Apply topically 4 times daily as needed for moderate pain (4-6)   diltiazem (DILACOR XR) 180 MG 24 hr capsule 11/27/2022 at PM Self No Yes   Sig: Take 1 capsule (180 mg) by mouth daily   diphenhydrAMINE (BENADRYL) 25 MG capsule 11/27/2022 at PM Self Yes Yes   Sig: Take 25 mg by mouth At Bedtime    hydrocortisone 1 % CREA cream Unknown at PRN Self Yes Yes   Sig: Apply topically 2 times daily as needed for itching   loratadine (CLARITIN) 10 MG tablet 11/27/2022 Self Yes Yes   Sig: Take 10 mg by mouth daily as needed   magnesium hydroxide (MILK OF MAGNESIA) 400 MG/5ML suspension 11/26/2022 at PM Self Yes Yes   Sig: Take 15 mLs by mouth daily as needed for constipation   metroNIDAZOLE (METROGEL) 1 % gel 11/27/2022 at Unknown time Self Yes Yes   Sig: Apply topically daily applies to rosacea on nose   saline 0.9 % AERS 11/27/2022 at PM, uses most nights Self Yes Yes   Sig: Spray 1 spray in nostril 2 times daily as needed      Facility-Administered Medications: None       SOCIAL HISTORY:  The patient does not smoke.  Has a shot of alcohol per day.  He is .  He has 4 children.  He is retired, used to work for [employer] and [employer] in [employer].  Also, attended [employer] school and work for a [employer] office at some point.  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  Also, worked in [place], [place].    The patient uses a walker for ambulation.  He lives in an independent living facility at [facility].    FAMILY HISTORY:  Reviewed, not felt to be contributory.    REVIEW OF SYSTEMS:  As noted in HPI, otherwise 10-point review of systems negative.    PHYSICAL EXAMINATION:    VITAL SIGNS:  Temperature 97.6, heart rate 94, blood pressure 125/77, respirations 18, O2 saturation 97%.  GENERAL:  This is an alert and oriented 89-year-old male patient who is lying in bed.  Conversant and friendly.  HEENT:  Pupils equal, round, and reactive.  No scleral icterus or conjunctival injection.  Oropharynx -- no gross erythema or exudate.  Dry mucous membranes.  NECK:  No bruits, JVD or adenopathy.  HEART:  Regular rate and rhythm, without murmurs, rubs, or gallops.  LUNGS:  Diminished at bases.  No crackles or wheezes.  ABDOMEN:  Soft, nontender, nondistended, positive bowel sounds.  No femoral bruits.  EXTREMITIES:   Show trace left leg edema.  NEUROLOGIC:  Exam reveals no gross focal motor or sensory deficits.  Negative straight leg raises.  No tenderness on palpation of his back.      LABORATORY AND IMAGING:  See [facility] for relevant lab and imaging; see Epic for complete details.      ASSESSMENT AND PLAN:    Mr. Corwin Puentes is a 89-year-old male patient with history including hypertension, abdominal aortic aneurysm, paroxysmal atrial fibrillation, hyperlipidemia, stroke history, osteoarthritis and osteoporosis, who presents to [facility] after suffering a fall with subsequent weakness and debilitation.    On initial evaluation he had vital signs that showed temperature 97.6, heart rate 94, blood pressure 125/77, respiration 18, O2 saturation 97% on room air.  Laboratory evaluation that showed a BMP which was normal; CBC showed white count 13.1, hemoglobin 11.9, and platelets 228; urinalysis did not suggest infection.  He had x-rays of the chest, which were negative for acute process.  Head CT did not show any acute findings.  He had an EKG that showed sinus rhythm without acute ischemic changes.      Fall with weakness and debilitation.  * Initial presentation as above.   - Scheduled acetaminophen 1000 mg t.i.d. and also have available p.r.n. 500 mg b.i.d., maximum 4000 mg a day.    - We will have PT and OT see the patient.    - Ask Social Work to see the patient.    - He may need increased services or some time in a transitional care unit.    Lower back pain, suspect musculoskeletal strain.   - Continue analgesics and therapy as above.    Leukocytosis, suspect reactive.   * No overt signs of infection.   - Monitor CBC.   - Monitor clinically for signs of infection.    Anemia, suspect chronic component.    * No clinical signs of any major active bleeding.    - Monitor CBC.    Benign essential hypertension.  - Continue prior to admission diltiazem.    Paroxysmal atrial fibrillation.  Stroke history.    - Continue aspirin and  diltiazem.    Peripheral arterial disease with abdominal aortic aneurysm.  Dyslipidemia.    - Continue prior to admission aspirin and atorvastatin.    Allergic rhinitis.  - Continue loratadine at home.    Depression/anxiety.  - Continue p.r.n. lorazepam.    PROPHYLAXIS:  Pneumoboots and ambulation.    CODE STATUS:  FULL CODE.      Konstantin Ag Jr., MD        D: 2022   T: 2022   MT: [id number]    Name:     SANTA DAS  MRN:      [id number]        Account:     [id number]   :      1927           Admitted:    2022       Document: [id number]    cc:  Melania Jimenez MD

## 2022-11-29 NOTE — PHARMACY-ADMISSION MEDICATION HISTORY
Pharmacy Medication History  Admission medication history interview status for the 11/28/2022  admission is complete. See EPIC admission navigator for prior to admission medications     Location of Interview: Patient room  Medication history sources: Patient and Care Everywhere    Significant changes made to the medication list:  Added acetaminophen, voltaren gel   Removed:   -aspirin 81 mg (patient states no longer taking any aspirin. Of note[facility] 325 mg daily aspirin)   -betamethasone dipropionate 0.05% cream BID PRN (no fill history, not [facility] records, patient states not taking)   Adjusted:   -atorvastatin tablet strength, to reflect fill history  -vitamin D, to reflect patient did not know current dose/tablet strength   -benadryl from PRN --> scheduled  -loratadine from scheduled --> PRN   -lorazepam 0.25 mg daily PRN --> 0.5 mg q8h PRN   -magnesium citrate PRN --> milk of magnesia PRN   -Areds vitamin BID --> daily  -Systane --> Refresh   -saline spray from scheduled --> PRN     In the past week, patient estimated taking medication this percent of the time: greater than 90%    Additional medication history information:   Patient is a reliable historian - did not know strengths of some OTC products/doses.     Medication reconciliation completed by provider prior to medication history? No    Time spent in this activity: 25 minutes    Medication Sig Last Dose Taking? Auth Provider   acetaminophen (TYLENOL) 500 MG tablet Take 1,000 mg by mouth 4 times daily 11/28/2022 at 0800 Yes Unknown, Entered By History   atorvastatin (LIPITOR) 20 MG tablet Take 10 mg by mouth daily 11/27/2022 at PM Yes Unknown, Entered By History   carboxymethylcellulose PF (REFRESH PLUS) 0.5 % ophthalmic solution Place 1 drop into both eyes 2 times daily as needed for dry eyes 11/27/2022 at uses most every day Yes Unknown, Entered By History   Cholecalciferol (VITAMIN D3 PO) Take 1 tablet by mouth daily  Unknown tablet strength 11/27/2022 at midday Yes Unknown, Entered By History   diclofenac (VOLTAREN) 1 % topical gel Apply topically 4 times daily as needed for moderate pain (4-6) Unknown at PRN, rarely uses but has home supply Yes Unknown, Entered By History   diltiazem (DILACOR XR) 180 MG 24 hr capsule Take 1 capsule (180 mg) by mouth daily 11/27/2022 at PM Yes Ken Sanchez MD   diphenhydrAMINE (BENADRYL) 25 MG capsule Take 25 mg by mouth At Bedtime 11/27/2022 at PM Yes Unknown, Entered By History   hydrocortisone 1 % CREA cream Apply topically 2 times daily as needed for itching Unknown at PRN Yes Unknown, Entered By History   loratadine (CLARITIN) 10 MG tablet Take 10 mg by mouth daily as needed 11/27/2022 Yes Reported, Patient   LORazepam (ATIVAN) 0.5 MG tablet Take 0.5 mg by mouth every 8 hours as needed for anxiety 11/27/2022 at PM Yes Unknown, Entered By History   magnesium hydroxide (MILK OF MAGNESIA) 400 MG/5ML suspension Take 15 mLs by mouth daily as needed for constipation 11/26/2022 at PM Yes Unknown, Entered By History   metroNIDAZOLE (METROGEL) 1 % gel Apply topically daily applies to rosacea on nose 11/27/2022 at Unknown time Yes Reported, Patient   Multiple Vitamins-Minerals (PRESERVISION AREDS PO) Take 1 capsule by mouth daily 11/27/2022 at noon Yes Unknown, Entered By History   saline 0.9 % AERS Spray 1 spray in nostril 2 times daily as needed 11/27/2022 at PM, uses most nights Yes Unknown, Entered By History       The information provided in this note is only as accurate as the sources available at the time of update(s)   Raina Bosch, AngelD

## 2022-11-29 NOTE — ED NOTES
"[facility]  ED Nurse Handoff Report    ED Chief complaint: Fall and Fatigue      ED Diagnosis:   Final diagnoses:   Fall, initial encounter   Generalized muscle weakness       Code Status: MD jewels le    Allergies:   Allergies   Allergen Reactions    Alendronic Acid     Mold     Peanut-Derived     Pollen Extract     Risedronic Acid [Risedronate]        Patient Story: fall, fatigue  Focused Assessment:  Patient presents to ED after fall at home due to fatigue, will be admitted for observation    Treatments and/or interventions provided: Tylenol, NS bolus  Patient's response to treatments and/or interventions: Pain not improved with Tylenol    To be done/followed up on inpatient unit:  assist with adl    Does this patient have any cognitive concerns?:  na    Activity level - Baseline/Home:  Stand with Assist  Activity Level - Current:   Stand with Assist    Patient's Preferred language: English   Needed?: No    Isolation: None  Infection: Not Applicable  Patient tested for COVID 19 prior to admission: YES  Bariatric?: No    Vital Signs:   Vitals:    11/28/22 1407 11/28/22 1704 11/28/22 1929 11/28/22 1930   BP: 116/80 125/77 132/85    Pulse: 94  87    Resp: 18      Temp: 97.6  F (36.4  C)      TempSrc: Oral      SpO2: 96% 97%  96%   Weight: 59.4 kg (131 lb)      Height: 1.727 m (5' 8\")          Cardiac Rhythm:     Was the PSS-3 completed:   Yes  What interventions are required if any?               Family Comments: na  OBS brochure/video discussed/provided to patient/family: Yes              Name of person given brochure if not patient: na              Relationship to patient: na    For the majority of the shift this patient's behavior was Green.   Behavioral interventions performed were none.    ED NURSE PHONE NUMBER: *[id number]         "

## 2022-11-29 NOTE — PROGRESS NOTES
[facility]      OUTPATIENT OCCUPATIONAL THERAPY  EVALUATION  PLAN OF TREATMENT FOR OUTPATIENT REHABILITATION  (COMPLETE FOR INITIAL CLAIMS ONLY)  Patient's Last Name, First Name, M.I.  YOB: 1927  Corwin Puentes                          Provider's Name  [facility] Medical Record No.  [id number]                               Onset Date:  11/28/22   Start of Care Date:  11/29/22     Type:     ___PT   _X_OT   ___SLP Medical Diagnosis:  Konstantin Ag MD                        OT Diagnosis:  Weakness, decreased ROM due to pain   Visits from SOC:  1   _________________________________________________________________________________  Plan of Treatment/Functional Goals    Planned Interventions: ADL retraining, home program guidelines   Goals: See Occupational Therapy Goals on Care Plan in [employer] electronic health record.    Therapy Frequency: Daily  Predicted Duration of Therapy Intervention: 12/05/22  _________________________________________________________________________________    I CERTIFY THE NEED FOR THESE SERVICES FURNISHED UNDER        THIS PLAN OF TREATMENT AND WHILE UNDER MY CARE     (Physician co-signature of this document indicates review and certification of the therapy plan).              Certification date from: 11/29/22, Certification date to: 12/05/22    Referring Physician: Konstantin Ag MD            Initial Assessment        See Occupational Therapy evaluation dated 11/29/22 in Epic electronic health record.

## 2022-11-29 NOTE — H&P
INTERNAL MEDICINE H&P    H&P dictated:  #[id number]    Konstantin Ag Jr., MD  [phone] (p)  Text Page  Kyle

## 2022-11-29 NOTE — PROGRESS NOTES
"   11/29/22 1200   Appointment Info   Signing Clinician's Name / Credentials (PT) Yrn Frost DPT       Present no   Living Environment   People in Home spouse   Current Living Arrangements independent living facility   Home Accessibility no concerns   Transportation Anticipated family or friend will provide  (Pt reports his son will pick him up upon discharge.)   Living Environment Comments Pt lives in an ILF. No concerns regarding stairs. Pt reports his son will pick him up upon discharge. Pt reports his son and his spouse can assist pt if needed.   Self-Care   Usual Activity Tolerance good   Current Activity Tolerance moderate   Regular Exercise No   Equipment Currently Used at Home walker, rolling;grab bar, toilet;grab bar, tub/shower;raised toilet seat;shower chair;wheelchair, manual   Fall history within last six months yes   Number of times patient has fallen within last six months 1   Activity/Exercise/Self-Care Comment Pt reports being able to cook, bathe and dress self at baseline. Pt ambulates w/ a FWW for household distances and uses a WC for community distances. Pt reports he does not drive and his spouse and son assists with errand management.   General Information   Onset of Illness/Injury or Date of Surgery 11/29/22   Referring Physician Konstantin Ag MD   Patient/Family Therapy Goals Statement (PT) \"To get better\"   Pertinent History of Current Problem (include personal factors and/or comorbidities that impact the POC) Per Chart: Mr. Puentes is a 89-year-old male patient with history noted below including hypertension, atrial fibrillation, stroke, abdominal aortic aneurysm, hyperlipidemia, depression/anxiety, and BPH, who presents with the above issues.  The patient lives in an independent living facility with his wife.  Typically uses a walker for ambulation.  Today the patient was ambulating in his apartment and suffered a mechanical fall.  He did not suffer a serious " injury, but was very weak.  He was subsequently brought to [facility] for further evaluation.  The patient's son notes that over the past he did strain his back a few days ago and this has limited his mobility and contributing to the fall today.   Existing Precautions/Restrictions fall   Weight-Bearing Status - LLE full weight-bearing   Weight-Bearing Status - RLE full weight-bearing   Cognition   Orientation Status (Cognition) oriented x 3   Pain Assessment   Patient Currently in Pain Yes, see Vital Sign flowsheet  (Reports mild back pain)   Integumentary/Edema   Integumentary/Edema no deficits were identifed   Posture    Posture Protracted shoulders;Forward head position   Range of Motion (ROM)   Range of Motion ROM is WFL   Strength (Manual Muscle Testing)   Strength (Manual Muscle Testing) Able to perform R SLR;Able to perform L SLR;Deficits observed during functional mobility   Strength Comments RLE Hip Flexion: 3/5; LLE Hip Flexion: 4/5   Bed Mobility   Comment, (Bed Mobility) Supine>sit w/ min A x 1   Transfers   Comment, (Transfers) Sit>stand w/ FWW and CGA   Gait/Stairs (Locomotion)   [place] Level (Gait) contact guard   Assistive Device (Gait) walker, front-wheeled   Distance in Feet (Required for LE Total Joints) 75'  (10' eval)   Distance in Feet (Gait) 75'   Comment, (Gait/Stairs) Pt ambulated ~10' w/ FWW and CGA for eval.   Balance   Balance Comments Pt able to sit at EOB unsupported without LOB. Pt ambulates using a FWW for added stability and support.   Sensory Examination   Sensory Perception patient reports no sensory changes   Clinical Impression   Criteria for Skilled Therapeutic Intervention Yes, treatment indicated   PT Diagnosis (PT) Impaired gait   Influenced by the following impairments Decreased activity tolerance; decreased balance; decreased strength   Functional limitations due to impairments Impaired functional mobility   Clinical Presentation (PT Evaluation Complexity)  Stable/Uncomplicated   Clinical Presentation Rationale Clinical judgement   Clinical Decision Making (Complexity) low complexity   Planned Therapy Interventions (PT) balance training;bed mobility training;gait training;patient/family education;strengthening;transfer training   Risk & Benefits of therapy have been explained evaluation/treatment results reviewed;care plan/treatment goals reviewed;risks/benefits reviewed;current/potential barriers reviewed;participants voiced agreement with care plan;participants included;patient   PT Total Evaluation Time   PT Eval, Low Complexity Minutes (36729) 10   Physical Therapy Goals   PT Frequency Daily   PT Predicted Duration/Target Date for Goal Attainment 12/04/22   PT Goals Bed Mobility;Transfers;Gait   PT: Bed Mobility Supervision/stand-by assist;Supine to/from sit   PT: Transfers Supervision/stand-by assist;Sit to/from stand;Assistive device   PT: Gait Supervision/stand-by assist;Assistive device;100 feet   Interventions   Interventions Quick Adds Gait Training;Therapeutic Activity   Therapeutic Activity   Therapeutic Activities: dynamic activities to improve functional performance Minutes (76872) 14   Symptoms Noted During/After Treatment Fatigue   Treatment Detail/Skilled Intervention Greeted pt supine in bed, agreed to PT. VSS on RA throughout session. Pt performed supine>sit via logroll technique and min A x 1, needing assist for trunk control. Pt reports back pain is limiting his mobility at this time. Once in sitting, pt able to scoot self to EOB and sit unsupported without LOB. Pt performed sit>stand x 6 w/ FWW and CGA, verbal cues for hand placement. After ambulation, pt returned bed and performed stand>sit w/ FWW and CGA, verbal cues to descend in a slow, controlled motion. Pt performed sit>supine w/ min A x 1, needing assist to safely lift BLEs back into bed and reposition. Pt ended session supine in bed, with all needs met and call light within reach.   Gait  Training   Gait Training Minutes (74183) 15   Symptoms Noted During/After Treatment (Gait Training) fatigue   Treatment Detail/Skilled Intervention Pt ambulated w/ FWW and CGA. Pt ambulated with decreased gait speed, downward step length w/ RLE, FWW out too far in front of pt, and steady. Verbal cues for upright gaze and posture, to increase step length with RLE, and to stay within DIMITRI of FWW at all times. Pt needing heavy cues throughout, often reverting to original gait pattern. No overt LOB noted.   PT Discharge Planning   PT Plan Bed mobility; progress gait w/ FWW; dynamic balance   PT Discharge Recommendation (DC Rec) home with home care physical therapy;home with assist   PT Rationale for DC Rec Pt is below baseline. Pt currently requiring assist with all functional mobility. Pt presents with deficits in activity tolerance, balance, and strength. Due to these deficits, pt would benefit from continued skilled PT services via HHPT to address deficits and improve IND with safety and functional mobility. Pt does not drive and uses an AD at baseline, making it a taxing effort to leave the home. Pt reports his spouse and son can assist pt as needed. If pt does not have assist at home, pt would require short TCU stay.   PT Brief overview of current status Supine>sit w/ min A x 1; sit>stand w/ FWW and CGA; gait w/ FWW and CGA   Total Session Time   Timed Code Treatment Minutes 29   Total Session Time (sum of timed and untimed services) 39

## 2022-11-29 NOTE — PROGRESS NOTES
11/29/22 1249   Appointment Info   Signing Clinician's Name / Credentials (OT) David Ricketts OTR/L   Living Environment   People in Home spouse   Current Living Arrangements independent living facility   Home Accessibility no concerns   Transportation Anticipated family or friend will provide   Living Environment Comments Pt lives in an ILF. No concerns regarding stairs. Pt reports his son will pick him up upon discharge. Pt reports his son and his spouse can assist pt if needed.   Self-Care   Usual Activity Tolerance good   Current Activity Tolerance moderate   Regular Exercise No   Equipment Currently Used at Home walker, rolling;grab bar, toilet;grab bar, tub/shower;raised toilet seat;shower chair;wheelchair, manual   Fall history within last six months yes   Number of times patient has fallen within last six months 1   Activity/Exercise/Self-Care Comment Pt. reports IND with toileting at [facility]. Pt's son assists with showering   Instrumental Activities of Daily Living (IADL)   Previous Responsibilities medication management;finances   IADL Comments Pt's spouse completes mojority of IADL. PT assists with med management and home fincance.   General Information   Onset of Illness/Injury or Date of Surgery 11/28/22   Referring Physician Konstantin Ag MD   Patient/Family Therapy Goal Statement (OT) return home   Additional Occupational Profile Info/Pertinent History of Current Problem Per Chart: Mr. Puentes is a 89-year-old male patient with history noted below including hypertension, atrial fibrillation, stroke, abdominal aortic aneurysm, hyperlipidemia, depression/anxiety, and BPH, who presents with the above issues.  The patient lives in an independent living facility with his wife.  Typically uses a walker for ambulation.  Today the patient was ambulating in his apartment and suffered a mechanical fall.  He did not suffer a serious injury, but was very weak.  He was subsequently brought to [facility]  for further evaluation.  The patient's son notes that over the past he did strain his back a few days ago and this has limited his mobility and contributing to the fall today.   Cognitive Status Examination   Orientation Status orientation to person, place and time   Pain Assessment   Patient Currently in Pain Yes, see Vital Sign flowsheet  (reports soreness lumbar area)   Bed Mobility   Bed Mobility rolling left;rolling right   Rolling Left [place] (Bed Mobility) supervision;verbal cues   Rolling Right [place] (Bed Mobility) supervision;verbal cues   Transfers   Transfer Comments Pt declined OOB activity   Activities of Daily Living   BADL Assessment/Intervention lower body dressing   Lower Body Dressing Assessment/Training   Comment, (Lower Body Dressing) Crossing leg from supine posiiton   [place] Level (Lower Body Dressing) moderate assist (50% patient effort)   Clinical Impression   Criteria for Skilled Therapeutic Interventions Met (OT) Yes, treatment indicated   OT Diagnosis Weakness, decreased ROM due to pain   OT Problem List-Impairments impacting ADL activity tolerance impaired;flexibility;muscle tone;pain   Assessment of Occupational Performance 3-5 Performance Deficits   Identified Performance Deficits Dressing, toileting, bathing, IADL   Planned Therapy Interventions (OT) ADL retraining;home program guidelines   Intervention Comments Pt with limited activity tolerance.   Clinical Decision Making Complexity (OT) low complexity   Anticipated Equipment Needs Upon Discharge (OT) dressing equipment;shower chair;commode chair   Risk & Benefits of therapy have been explained evaluation/treatment results reviewed;care plan/treatment goals reviewed;risks/benefits reviewed;current/potential barriers reviewed;participants voiced agreement with care plan;participants included;patient   OT Total Evaluation Time   OT Eval, Low Complexity Minutes (89976) 10   Therapy Certification   Start of Care Date  11/29/22   Certification date from 11/29/22   Certification date to 12/05/22   Medical Diagnosis Konstantin Ag MD   OT Goals   Therapy Frequency (OT) Daily   OT Predicted Duration/Target Date for Goal Attainment 12/05/22   OT Goals Lower Body Dressing;Toilet Transfer/Toileting;Cognition;Transfers   OT: Lower Body Dressing Supervision/stand-by assist;within precautions   OT: Transfer Supervision/stand-by assist;within precautions  (Shower chair transfer)   OT: Toilet Transfer/Toileting Independent;cleaning and garment management;toilet transfer   OT: Cognitive Patient/caregiver will verbalize understanding of cognitive assessment results/recommendations as needed for safe discharge planning   Interventions   Interventions Quick Adds Self-Care/Home Management   Self-Care/Home Management   Self-Care/Home Mgmt/ADL, Compensatory, Meal Prep Minutes (20936) 10   Symptoms Noted During/After Treatment (Meal Preparation/Planning Training) fatigue;increased pain   Treatment Detail/Skilled Intervention Eval completed. Pt. declining mobility stating he had worked with PT earlier in day and wanted to pace his mobility due to pain.  OT educated pt on alternative ways to reach LE for mobility and ADL. Given verbal cues four supine figure floor LE posture to reach feet for cares. Pt. able to doff/don sock using strategy with SBA. Recommended to OT that if he were to return home he should have assist with LE dressing per family. Demonstrated strategies to reach LE while seated in chair. Pt. verbalized  understanding.   OT Discharge Planning   OT Plan LE Dresing education strategies   OT Discharge Recommendation (DC Rec) home with assist;home with home care occupational therapy   OT Rationale for DC Rec Pt. with limited acctivity tolerance. HAs good support from spouse and son for ADl at home. Pt. is now demonstrating increased dependence on family for self care with poor tolerance for activity. Would  benefit from HH OT  follow up to identify A/E and compenstory satrategies to reach PLOF.   OT Brief overview of current status Poor sitting tolearance due to back pain.   Total Session Time   Timed Code Treatment Minutes 10   Total Session Time (sum of timed and untimed services) 20

## 2022-11-29 NOTE — PROGRESS NOTES
[facility]    Medicine Progress Note - Hospitalist Service    Date of Admission:  11/28/2022    Assessment & Plan   Generalized weakness with mechanical fall  Traumatic workup including head CT and CXR was negative  Laceration of the R elbow repaired in the ED  - observation status  - PT consult, asked nursing to help facilitate in the ED  - scheduled tylenol  - SW consult    Back pain, suspect musculoskeletal  No weakness, numbness, or pain with palpation of the back, no red flags so will defer additional imaging  Plan  - PT consult  - scheduled tylenol  - SW consult    Leukocytosis,  Negative UA and covid  Plan  - monitor, no obvious infection and suspect reactive    Chronic medical issues  Anemia: will repeat CBC  HTN: on diltiazem  PAfib: aspirin and diltiazem  HLD: atorvastatin  PAD  Depression: continue home lorazepam prn     Diet: Combination Diet Low Saturated Fat Na <2400mg Diet, No Caffeine Diet    DVT Prophylaxis: Pneumatic Compression Devices  Young Catheter: Not present  Central Lines: None  Cardiac Monitoring: None  Code Status: Full Code      Disposition Plan      Expected Discharge Date: 11/29/2022                The patient's care was discussed with the Patient.    Urbano Bowie, DO  Hospitalist Service  [facility]  Securely message with the Vocera Web Console (learn more here)  Text page via [employer] Paging/Directory         Clinically Significant Risk Factors Present on Admission         # Hyponatremia: Lowest Na = 135 mmol/L (Ref range: 136-145) in last 2 days, will monitor as appropriate                      ______________________________________________________________________    Interval History   Records reviewed.  Has no real back pain but does feel some tightness in the mid to low back.  He states the mechanism is due to him normally having difficult getting out of bed, so we will try and use momentum to roll but overdid it and tweaked  his back a few days ago in the process.    Data reviewed today: I reviewed all medications, new labs and imaging results over the last 24 hours. I personally reviewed  CXR: normal  EKG: NSR without ST changes    Physical Exam   Vital Signs: Temp: 98.6  F (37  C) Temp src: Oral BP: 129/80 Pulse: 93   Resp: 16 SpO2: 96 % O2 Device: None (Room air)    Weight: 131 lbs 0 oz    GENERAL:  This is an alert and oriented 89-year-old male patient who is lying in bed.  Conversant and friendly..  HEART:  Regular rate and rhythm, without murmurs, rubs, or gallops.  LUNGS:  Diminished at bases.  No crackles or wheezes.  ABDOMEN:  Soft, nontender, nondistended, positive bowel sounds.  No femoral bruits.  NEUROLOGIC:  Exam reveals no gross focal motor or sensory deficits.  Negative straight leg raises.  No tenderness on palpation of his back.    Data   Recent Labs   Lab 11/28/22  1540   WBC 13.1*   HGB 11.9*   *         POTASSIUM 4.3   CHLORIDE 105   CO2 27   BUN 29   CR 0.82   ANIONGAP 3   VIRGEN 8.9   *     Recent Results (from the past 24 hour(s))   XR Chest 2 Views    Narrative    XR CHEST 2 VIEWS   11/28/2022 3:49 PM     HISTORY: fall, weakness    COMPARISON: None.      Impression    IMPRESSION: No acute cardiopulmonary disease.    BRIAN BENNETT MD         SYSTEM ID:  [id number]   CT Head w/o Contrast    Narrative    CT HEAD W/O CONTRAST 11/28/2022 4:06 PM    INDICATION: fall, head injury  TECHNIQUE: CT scan of the head without contrast. Dose reduction  techniques were used.  CONTRAST: None.  COMPARISON: 8/13/2017 head CT and 8/13/2017 brain MRI.    FINDINGS:   No intracranial hemorrhage, extraaxial collection, mass effect or CT  evidence of acute infarct.  Severe presumed chronic small vessel  ischemic changes. Mild to moderate generalized volume loss. The  ventricles are proportional to the sulci. No skull fracture. No scalp  hematoma. Unremarkable orbits. Paranasal sinuses are free of  significant  disease. Clear mastoid air cells.      Impression    IMPRESSION:  Age-related changes as above with no acute intracranial abnormality.    DELMY ROSA MD         SYSTEM ID:  [id number]

## 2022-11-29 NOTE — ED NOTES
I was asked to assist this patient with a discharge plan. I noted the PT/OT notes said this patient could go home with family assist or short TCU stay.  I spoke with this patient, his wife Anahy and his son--who goes by .  In discussion with this patient and family he would like to return home with HC.  In discussion the family had private pay help arranged 3 weeks ago but this patient refused.  Now he is accepting.  They would like Home Care.  I explained to this patient and family that they should have the private pay stay as soon as possible.  I also gave them private pay in home help resources.  This patient and family agreed that the family would assist patient if a bridge in care would be needed until HC could start.  I sent a referral to [facility] and they assessed and accepted this patient with a start in 2 days.  I informed the family of this and the [facility] contact info is on the discharge paperwork.  I answered all of this patients and families questions.  I updated the Hospitalist and the bedside RN.  I have completed this consult.

## 2022-11-30 ENCOUNTER — PATIENT OUTREACH (OUTPATIENT)
Dept: CARE COORDINATION | Facility: CLINIC | Age: 87
End: 2022-11-30

## 2022-11-30 NOTE — PLAN OF CARE
Occupational Therapy Discharge Summary    Reason for therapy discharge:    Discharged to home with home therapy.    Progress towards therapy goal(s). See goals on Care Plan in [facility] electronic health record for goal details.  Goals not met.  Barriers to achieving goals:   discharge on same date as initial evaluation.    Therapy recommendation(s):    Continued therapy is recommended.  Rationale/Recommendations:  to increase indep with funcitonal mobility and self cares.

## 2022-11-30 NOTE — PROGRESS NOTES
Clinic Care Coordination Contact  [facility]: Post-Discharge Note  SITUATION                                                      Admission:    Admission Date: 11/28/22   Reason for Admission: Fall, initial encounter    Generalized muscle weakness  Discharge:   Discharge Date: 11/29/22  Discharge Diagnosis: Fall, initial encounter    Generalized muscle weakness    BACKGROUND                                                      Per hospital discharge summary and inpatient provider notes:    Corwin Puentes is a 89 year old male with past medical history including CVA and osteoporosis, who presents with fall and generalized weakness.  He is not anticoagulated.  Patient reports that he is been dealing with constipation and generalized weakness over the past few days.  He took some milk of magnesia last night, and this morning, he was in the bathroom on 3 separate occasions.  He states that on the last of his occasions, when he tried to stand up, he felt shaky in the knees, and his right knee gave out, causing him to fall to his right    ASSESSMENT           Discharge Assessment  How are you doing now that you are home?: doing  well  How are your symptoms? (Red Flag symptoms escalate to triage hotline per guidelines): Improved  Do you feel your condition is stable enough to be safe at home until your provider visit?: Yes  Does the patient have their discharge instructions? : Yes  Does the patient have questions regarding their discharge instructions? : No  Were you started on any new medications or were there changes to any of your previous medications? : Yes  Does the patient have all of their medications?: Yes  Do you have questions regarding any of your medications? : No  Do you have all of your needed medical supplies or equipment (DME)?  (i.e. oxygen tank, CPAP, cane, etc.): Yes  Discharge follow-up appointment scheduled within 14 calendar days? : No  Is patient agreeable to assistance with scheduling? :  No (will calinic to schedule appt with PT & PCP)    Post-op ([facility] CTA Only)  If the patient had a surgery or procedure, do they have any questions for a nurse?: No         PLAN                                                      Outpatient Plan:    No future appointments.      For any urgent concerns, please contact our 24 hour nurse triage line: [phone] ([phone])       COLT Mantilla  [phone]  [facility]

## 2022-11-30 NOTE — PLAN OF CARE
Physical Therapy Discharge Summary    Reason for therapy discharge:    Discharged to home with home therapy.    Progress towards therapy goal(s). See goals on Care Plan in [facility] electronic health record for goal details.  Goals partially met.  Barriers to achieving goals:   discharge from facility.    Therapy recommendation(s):    Continued therapy is recommended.  Rationale/Recommendations:  eval and treat with home PT.

## 2023-05-24 NOTE — PROGRESS NOTES
Corwin is a 89 year old who is being evaluated via a billable telephone visit.      What phone number would you like to be contacted at? [phone]  How would you like to obtain your AVS? Mail a copy     Vitals - Patient Reported  Systolic (Patient Reported): 120  Diastolic (Patient Reported): 75  Weight (Patient Reported): 59.4 kg (131 lb)    Amber Borges LPN      Distant Location (provider location):  On-site  Phone call duration: 18 minutes    CC:  SVT    VITALS:  120/75  131#    1. Syncope noted 7/2018 while sitting.  Theorized due to rapid SVT (>200 bpm)  2. Reported pAFib by EMS tracing at time of syncopal episode 7/2018. NO AFib has been seen while in hospital or on subsequent monitors. Therefore, not on AC.  3. SVT noted on event monitor 8/2019, with rates to 240 bpm. Dr. Sanchez discussed ablation, amiodarone and Diltiazem and given advanced age, they mutually agreed on Diltiazem.   4. AAA - 5.4x4.2 cm on CTA 5/2022 ([place]).  Sees Vascular there.    I had seen Corwin back in 10/2019 at which time he had diarrhea and thought things were going well on diltiazem.  His chronic constipation was under good control.  He had not had further syncope, and annual follow-up recommended    INTERVAL HISTORY:  He saw Dr. Sanchez 10/2021 at which time he and his son SHEBA thought things were going well.  He had fractured his femur after a fall 2/2020, and had required surgical intervention but was still living independently in a senior building with Anahy.  EKG at that time showed SR with PACs.  18-month follow-up recommended, as he was quite stable.    He was seen by Vascular (Dr. Dickey) 5/2022 and CT angiogram of abdomen/pelvis showed infrarenal abdominal aortic aneurysm continued to be 5.4 cm, unchanged.    He was hospitalized 11/2022 due to weakness and fall.  Head CT showed no intracranial hemorrhage.    TODAY:  Federico turns 89 tomorrow!  He's really feeling good overall. He and his wife Anahy live in Independent  Living. He takes care of all of his medications and thinks he's done well with this, without confusion or concerns.    Denies CP, pressure, tightness. No palpitations, dizziness, lightheadedness. He fell off the toilet 12/2022 while trying to stand up and fell on his bottom. No LOC. No recurrence syncope.     Really feels Diltiazem is working well. [facility]    DIAGNOSTICS:  EKG 11/2022 in ER SR 86 bpm  Echocardiogam 7/2018 showed EF 55-60% with grade I diastolic dysfunction. Normal RV size/function. Mild JON. 1+ MR. Trace TR with RVSP 24.2 mmHg+RAP. Aortic sclerosis.     REVIEW OF SYSTEMS:    Review of Systems  Negative with the exception that noted aboave    PHYSICAL EXAM:  120/75  131#    Deferred, telephone    PLAN:  1. 1 y follow-up in-person      ASSESSMENT/PLAN:    1. SVT    Thought to be cause of syncope as it was exceedingly fast, >200 bpm    Remains on Diltiazem 180 mg daily - [facility]     PLAN:    Continue Diltiazem 180 mg daily.     Annual follow-up as requested by patient.  We will try to get this in person with an EKG.      2. AAA    Followed closely by Vascular @ [place]. Stable 5/2022     PLAN:    He'll contact [facility] to determine if further imaging is needed given age       CURRENT MEDICATIONS:  Current Outpatient Medications   Medication Sig Dispense Refill     acetaminophen (TYLENOL) 500 MG tablet Take 1,000 mg by mouth 4 times daily       atorvastatin (LIPITOR) 20 MG tablet Take 10 mg by mouth daily       carboxymethylcellulose PF (REFRESH PLUS) 0.5 % ophthalmic solution Place 1 drop into both eyes 2 times daily as needed for dry eyes       Cholecalciferol (VITAMIN D3 PO) Take 1 tablet by mouth daily Unknown tablet strength       diclofenac (VOLTAREN) 1 % topical gel Apply topically 4 times daily as needed for moderate pain (4-6)       diltiazem (DILACOR XR) 180 MG 24 hr capsule Take 1 capsule (180 mg) by mouth daily 30 capsule 11     diphenhydrAMINE (BENADRYL) 25 MG  capsule Take 25 mg by mouth At Bedtime       hydrocortisone 1 % CREA cream Apply topically 2 times daily as needed for itching       loratadine (CLARITIN) 10 MG tablet Take 10 mg by mouth daily as needed       LORazepam (ATIVAN) 0.5 MG tablet Take 0.5 mg by mouth every 8 hours as needed for anxiety       magnesium hydroxide (MILK OF MAGNESIA) 400 MG/5ML suspension Take 15 mLs by mouth daily as needed for constipation       Multiple Vitamins-Minerals (PRESERVISION AREDS PO) Take 1 capsule by mouth daily       saline 0.9 % AERS Spray 1 spray in nostril 2 times daily as needed       metroNIDAZOLE (METROGEL) 1 % gel Apply topically daily applies to rosacea on nose (Patient not taking: Reported on 5/26/2023)           ORDERS PLACED:  Orders Placed This Encounter   Procedures     Follow-Up with Cardiology CAR[facility]     No orders of the defined types were placed in this encounter.    There are no discontinued medications.      Encounter Diagnoses   Name Primary?     Paroxysmal supraventricular tachycardia (H)      SVT (supraventricular tachycardia) (H)          ALLERGIES     Allergies   Allergen Reactions     Alendronate      Mold      Peanut-Containing Drug Products      Pollen Extract      Risedronic Acid [Risedronate]        PAST MEDICAL HISTORY:  Past Medical History:   Diagnosis Date     Atrial fibrillation (H)      Hypertension        PAST SURGICAL HISTORY:  Past Surgical History:   Procedure Laterality Date     ORTHOPEDIC SURGERY      left ankle fusion     ORTHOPEDIC SURGERY      right knee replacement        FAMILY HISTORY:  No family history on file.    SOCIAL HISTORY:  Social History     Socioeconomic History     Marital status:      Spouse name: None     Number of children: None     Years of education: None     Highest education level: None   Tobacco Use     Smoking status: Never     Smokeless tobacco: Never   Substance and Sexual Activity     Alcohol use: Yes     Comment: one a day     Drug use: No     Sexual  activity: Never       I have reviewed the note as documented above.  This accurately captures the substance of my conversation with the patient.     Phone call contact time  Call Started at 1008  Call Ended at 1020  Duration 12 minutes    GIANA ReyesAS

## 2023-05-26 ENCOUNTER — VIRTUAL VISIT (OUTPATIENT)
Dept: CARDIOLOGY | Facility: CLINIC | Age: 88
End: 2023-05-26
Attending: INTERNAL MEDICINE
Payer: MEDICARE

## 2023-05-26 DIAGNOSIS — I47.10 SVT (SUPRAVENTRICULAR TACHYCARDIA) (H): ICD-10-CM

## 2023-05-26 DIAGNOSIS — I47.10 PAROXYSMAL SUPRAVENTRICULAR TACHYCARDIA (H): ICD-10-CM

## 2023-05-26 PROCEDURE — 99442 PR PHYSICIAN TELEPHONE EVALUATION 11-20 MIN: CPT | Mod: 95 | Performed by: PHYSICIAN ASSISTANT

## 2023-05-26 RX ORDER — DILTIAZEM HYDROCHLORIDE 180 MG/1
180 CAPSULE, EXTENDED RELEASE ORAL DAILY
Qty: 30 CAPSULE | Refills: 11 | Status: CANCELLED | OUTPATIENT
Start: 2023-05-26

## 2023-05-26 NOTE — LETTER
5/26/2023    CAYLA CRUZ MD  [address] [place] [address]    RE: Corwin Puentes       Dear Colleague,     I had the pleasure of seeing Corwin Puentes in the [facility].  Corwin is a 89 year old who is being evaluated via a billable telephone visit.      What phone number would you like to be contacted at? [phone]  How would you like to obtain your AVS? Mail a copy     Vitals - Patient Reported  Systolic (Patient Reported): 120  Diastolic (Patient Reported): 75  Weight (Patient Reported): 59.4 kg (131 lb)    Amber Borges LPN      Distant Location (provider location):  On-site  Phone call duration: 18 minutes    CC:  SVT    VITALS:  120/75  131#    1. Syncope noted 7/2018 while sitting.  Theorized due to rapid SVT (>200 bpm)  2. Reported pAFib by EMS tracing at time of syncopal episode 7/2018. NO AFib has been seen while in hospital or on subsequent monitors. Therefore, not on AC.  3. SVT noted on event monitor 8/2019, with rates to 240 bpm. Dr. Sanchez discussed ablation, amiodarone and Diltiazem and given advanced age, they mutually agreed on Diltiazem.   4. AAA - 5.4x4.2 cm on CTA 5/2022 ([place]).  Sees Vascular there.    I had seen Corwin back in 10/2019 at which time he had diarrhea and thought things were going well on diltiazem.  His chronic constipation was under good control.  He had not had further syncope, and annual follow-up recommended    INTERVAL HISTORY:  He saw Dr. Sanchez 10/2021 at which time he and his son SHEBA thought things were going well.  He had fractured his femur after a fall 2/2020, and had required surgical intervention but was still living independently in a senior building with Anahy.  EKG at that time showed SR with PACs.  18-month follow-up recommended, as he was quite stable.    He was seen by Vascular (Dr. Dickey) 5/2022 and CT angiogram of abdomen/pelvis showed infrarenal abdominal aortic aneurysm continued to be 5.4 cm, unchanged.    He was  hospitalized 11/2022 due to weakness and fall.  Head CT showed no intracranial hemorrhage.    TODAY:  Federico turns 89 tomorrow!  He's really feeling good overall. He and his wife Anahy live in Independent Living. He takes care of all of his medications and thinks he's done well with this, without confusion or concerns.    Denies CP, pressure, tightness. No palpitations, dizziness, lightheadedness. He fell off the toilet 12/2022 while trying to stand up and fell on his bottom. No LOC. No recurrence syncope.     Really feels Diltiazem is working well. [facility]    DIAGNOSTICS:  EKG 11/2022 in ER SR 86 bpm  Echocardiogam 7/2018 showed EF 55-60% with grade I diastolic dysfunction. Normal RV size/function. Mild JON. 1+ MR. Trace TR with RVSP 24.2 mmHg+RAP. Aortic sclerosis.     REVIEW OF SYSTEMS:    Review of Systems  Negative with the exception that noted aboave    PHYSICAL EXAM:  120/75  131#    Deferred, telephone    PLAN:  1 y follow-up in-person      ASSESSMENT/PLAN:    1. SVT  Thought to be cause of syncope as it was exceedingly fast, >200 bpm  Remains on Diltiazem 180 mg daily [facility]     PLAN:  Continue Diltiazem 180 mg daily.   Annual follow-up as requested by patient.  We will try to get this in person with an EKG.      2. AAA  Followed closely by Vascular @ [place]. Stable 5/2022     PLAN:  He'll contact [place] to determine if further imaging is needed given age       CURRENT MEDICATIONS:  Current Outpatient Medications   Medication Sig Dispense Refill    acetaminophen (TYLENOL) 500 MG tablet Take 1,000 mg by mouth 4 times daily      atorvastatin (LIPITOR) 20 MG tablet Take 10 mg by mouth daily      carboxymethylcellulose PF (REFRESH PLUS) 0.5 % ophthalmic solution Place 1 drop into both eyes 2 times daily as needed for dry eyes      Cholecalciferol (VITAMIN D3 PO) Take 1 tablet by mouth daily Unknown tablet strength      diclofenac (VOLTAREN) 1 % topical gel Apply topically 4  times daily as needed for moderate pain (4-6)      diltiazem (DILACOR XR) 180 MG 24 hr capsule Take 1 capsule (180 mg) by mouth daily 30 capsule 11    diphenhydrAMINE (BENADRYL) 25 MG capsule Take 25 mg by mouth At Bedtime      hydrocortisone 1 % CREA cream Apply topically 2 times daily as needed for itching      loratadine (CLARITIN) 10 MG tablet Take 10 mg by mouth daily as needed      LORazepam (ATIVAN) 0.5 MG tablet Take 0.5 mg by mouth every 8 hours as needed for anxiety      magnesium hydroxide (MILK OF MAGNESIA) 400 MG/5ML suspension Take 15 mLs by mouth daily as needed for constipation      Multiple Vitamins-Minerals (PRESERVISION AREDS PO) Take 1 capsule by mouth daily      saline 0.9 % AERS Spray 1 spray in nostril 2 times daily as needed      metroNIDAZOLE (METROGEL) 1 % gel Apply topically daily applies to rosacea on nose (Patient not taking: Reported on 5/26/2023)           ORDERS PLACED:  Orders Placed This Encounter   Procedures    Follow-Up with Cardiology CAROL     No orders of the defined types were placed in this encounter.    There are no discontinued medications.      Encounter Diagnoses   Name Primary?    Paroxysmal supraventricular tachycardia (H)     SVT (supraventricular tachycardia) (H)          ALLERGIES     Allergies   Allergen Reactions    Alendronate     Mold     Peanut-Containing Drug Products     Pollen Extract     Risedronic Acid [Risedronate]        PAST MEDICAL HISTORY:  Past Medical History:   Diagnosis Date    Atrial fibrillation (H)     Hypertension        PAST SURGICAL HISTORY:  Past Surgical History:   Procedure Laterality Date    ORTHOPEDIC SURGERY      left ankle fusion    ORTHOPEDIC SURGERY      right knee replacement        FAMILY HISTORY:  No family history on file.    SOCIAL HISTORY:  Social History     Socioeconomic History    Marital status:      Spouse name: None    Number of children: None    Years of education: None    Highest education level: None   Tobacco  Use    Smoking status: Never    Smokeless tobacco: Never   Substance and Sexual Activity    Alcohol use: Yes     Comment: one a day    Drug use: No    Sexual activity: Never       I have reviewed the note as documented above.  This accurately captures the substance of my conversation with the patient.     Phone call contact time  Call Started at 1008  Call Ended at 1020  Duration 12 minutes    Connie Barnes PA-C [facility]              Thank you for allowing me to participate in the care of your patient.      Sincerely,     Connie Barnes PA-C     [facility]  cc:   Ken Sanchez MD  [address]  [place] [address]

## 2023-05-26 NOTE — PATIENT INSTRUCTIONS
Federico - happy early birthday!  It was nice to speak with you today.    1.  Reviewed that from a cardiac perspective, things seem to be going well.  It does not appear you have had any trouble with more rapid heart rates, and you think the Diltiazem is working well.    2.  Reviewed that you have not had any further episodes of passing out, which is good news!    3.  Blood pressure today looks wonderful!  120/75.    PLAN:  Annual follow-up (avoid the snow!) - ideally in-person so we can get an EKG  CALL if issues prior!!! [phone]

## 2024-10-09 ENCOUNTER — OFFICE VISIT (OUTPATIENT)
Dept: CARDIOLOGY | Facility: CLINIC | Age: 89
End: 2024-10-09
Payer: MEDICARE

## 2024-10-09 VITALS — DIASTOLIC BLOOD PRESSURE: 82 MMHG | HEART RATE: 82 BPM | OXYGEN SATURATION: 98 % | SYSTOLIC BLOOD PRESSURE: 120 MMHG

## 2024-10-09 DIAGNOSIS — I47.10 SVT (SUPRAVENTRICULAR TACHYCARDIA) (H): Primary | ICD-10-CM

## 2024-10-09 PROCEDURE — 99203 OFFICE O/P NEW LOW 30 MIN: CPT | Performed by: INTERNAL MEDICINE

## 2024-10-09 PROCEDURE — 93000 ELECTROCARDIOGRAM COMPLETE: CPT | Performed by: INTERNAL MEDICINE

## 2024-10-09 NOTE — PATIENT INSTRUCTIONS
Call the nurse for any questions or concerns at [phone].     Plan:  1. Medication changes: none     2. Ziopatch monitor x 7 days     3. Follow up after testing if abnormal otherwise in 1 year   -Scheduling phone number: [phone]    It was great seeing you today!    Anayeli Garcia PA-C  Physician Assistant  [facility]

## 2024-10-09 NOTE — PROGRESS NOTES
Corwin Puentes arrived here on 10/9/2024 3:07 PM for 3-7 Days  Zio monitor placement per ordering provider Anayeli Garcia for the diagnosis Afib.  Patient s skin was prepped per protocol. Dr. Marie is the supervising MD.  Zio monitor was placed.  Instructions were reviewed with and given to the patient.  Patient verbalized understanding of wear, troubleshooting and monitor return instructions.

## 2024-10-09 NOTE — LETTER
10/9/2024    CAYLA CRUZ MD  [address] [place] [address]    RE: Corwin Puentes       Dear Colleague,     I had the pleasure of seeing Corwin Puentes in the [facility].  Cardiology Clinic Progress Note  Corwin Puentes MRN# [id number]   YOB: 1927 Age: 89 year old   Primary Cardiologist: Dr. Sanchez  Reason for visit: EP follow-up             Assessment and Plan:   Corwin Puentes is a very pleasant 89 year old male who is here today for EP follow-up.     1. SVT noted on event monitor 8/2019, with rates to 240 bpm. Dr. Sanchez discussed several options with patient including ablation, amiodarone and diltiazem. Diltiazem was pursued. No symptomatic recurrence per patient report.  2. Reported atrial fibrillation by EMS at the time of patient's syncopal episode 7/2018. There as been no documented atrial fibrillation since, thus, patient is not on chronic AC. ECG completed in clinic today reports atrial fibrillation - see discussion below.   3. Syncope 7/2018 while seated. No recurrence.  4. CVA in 2017 reported as a tiny occipital stroke, without hemorrhagic conversion  5. Infrarenal abdominal aortic aneurysm measuring 5 cm in greatest AP diameter on imaging 10/2023. Follows with Vascular at [facility] today: none    Mr. Puentes has overall been feeling well from a cardiac perspective since his last appointment. He denies any symptoms today.     ECG completed in clinic today is read as atrial flutter/fibrillation. However, there is significant baseline artifact, so much so that the ECG is difficult to interpret accurately. He has a history of frequent PACs, which may be what is making the rhythm irregular on ECG. Again, cannot determine rhythm accurately based on this ECG given artifact. We had a long discussion regarding possible atrial fibrillation and risk of CVA given his elevated CHADsVASC score of 4 (agex2, prior CVA). We agreed, after discussion, to pursue a 7  day Ziopatch monitor for further evaluation of rhythm. If this reveals definitive atrial fibrillation, we will then pursue AC initiation. If Ziopatch is unremarkable, Mr. Puentes will plan to follow up in 1 year.     A real time monitor because we wanted to place the monitor today in clinic, as to avoid the burden of setting up another appointment given patient's advanced age and mobility issues in the setting of his broken patella.     Anayeli Garcia PA-C  [facility]  Pager: [phone]          History of Presenting Illness:    Corwin Puentes is a very pleasant 89 year old male with a history of AAA (followed by vascular at [place]), CVA in 2017 (occipital stroke which was tiny and without hemorrhagic conversion), episode of syncope while seated 7/2018, and SVT noted on event monitor with rates up to 240 bpm.  The patient has been managed with diltiazem for the last several years without symptomatic recurrence.  He has only had episodes of nonsustained SVT noted on follow-up cardiac monitoring.      Of note, patient has a reported history of atrial fibrillation by EMS at the time of his syncopal episode in 2018.  He has had no documented atrial fibrillation during any hospitalization or on subsequent cardiac monitoring, as is therefore not on anticoagulation.    More recently, patient broke his patella.  As result, he has been wheelchair-bound.  He contacted the clinic 7/31 noting a 3-month history of chest tightness, wondering if it could be related to musculoskeletal issues given he was using a slide board for transfers.  He also reported some mild palpitations/sensation of heart racing.  He would also note the symptoms when rolling over.  He was recommended to present to clinic for further evaluation.  Several appointments have been canceled/rescheduled since then.      His most recent echocardiogram was completed 8/14/2017 that revealed LVEF 60 to 65%, mild MR.  Negative bubble study.    He  now presents to clinic today for evaluation. Reports feeling overall well from a cardiac perspective. His symptoms he called about in July have resolved. Denies chest pain, SOB, palpitations, lightheadedness, dizziness, near syncope or syncope. He is unfortunately wheelchair bound due to his broken patella. He has a home health aid available 24/7. Still lives with his wife.     Taking medications daily as prescribed. Blood pressure 120/82 and HR 82 in clinic today. ECG completed in clinic today is read as atrial flutter/fibrillation. However, there is significant baseline artifact, so much so that the ECG is difficult to interpret accurately.         Social History       Social History     Socioeconomic History     Marital status:      Spouse name: Not on file     Number of children: Not on file     Years of education: Not on file     Highest education level: Not on file   Occupational History     Not on file   Tobacco Use     Smoking status: Never     Smokeless tobacco: Never   Substance and Sexual Activity     Alcohol use: Yes     Comment: one a day     Drug use: No     Sexual activity: Never   Other Topics Concern     Parent/sibling w/ CABG, MI or angioplasty before 65F 55M? Not Asked   Social History Narrative     Not on file     Social Determinants of Health     Financial Resource Strain: Low Risk  (7/29/2022)    Received from [facility]    Overall Financial Resource Strain (CARDIA)      Difficulty of Paying Living Expenses: Not hard at all   Food Insecurity: No Food Insecurity (7/29/2022)    Received from [facility]    Hunger Vital Sign      Worried About Running Out of Food in the Last Year: Never true      Ran Out of Food in the Last Year: Never true   Transportation Needs: No Transportation Needs (7/29/2022)    Received from [facility]    PRAPARE - Transportation      Lack of Transportation (Medical): No      Lack of Transportation (Non-Medical): No    Physical Activity: Inactive (7/29/2022)    Received from [facility]    Exercise Vital Sign      Days of Exercise per Week: 0 days      Minutes of Exercise per Session: 0 min   Stress: No Stress Concern Present (7/29/2022)    Received from [facility]    [place] [employer] - Occupational Stress Questionnaire      Feeling of Stress : Not at all   Social Connections: Moderately Isolated (7/29/2022)    Received from [facility]    Social Connection and Isolation Panel [NHANES]      Frequency of Communication with Friends and Family: More than three times a week      Frequency of Social Gatherings with Friends and Family: More than three times a week      Attends Confucianist Services: Never      Active Member of Clubs or Organizations: No      Attends Club or Organization Meetings: Never      Marital Status:    Interpersonal Safety: Not At Risk (7/29/2022)    Received from [facility]    Humiliation, Afraid, Rape, and Kick questionnaire      Fear of Current or Ex-Partner: No      Emotionally Abused: No      Physically Abused: No      Sexually Abused: No   Housing Stability: Low Risk  (7/29/2022)    Received from [facility]    Housing Stability Vital Sign      Unable to Pay for Housing in the Last Year: No      Number of Places Lived in the Last Year: 2      Unstable Housing in the Last Year: No            Review of Systems:   Please see HPI         Physical Exam:   Vitals: /82 (BP Location: Left arm, Patient Position: Sitting, Cuff Size: Adult Regular)   Pulse 82   SpO2 98%    Wt Readings from Last 4 Encounters:   11/28/22 59.4 kg (131 lb)   10/28/21 60.1 kg (132 lb 9.6 oz)   08/23/20 56.7 kg (125 lb)   10/10/19 59.1 kg (130 lb 6.4 oz)     GEN: well nourished, in no acute distress.  HEENT:  Pupils equal, round. Sclerae nonicteric.   NECK: Supple, no masses appreciated. No JVD  C/V:  irregular rhythm; no murmur  "appreciated   RESP: Respirations are unlabored. Clear to auscultation bilaterally without wheezing, rales, or rhonchi.  GI: Abdomen soft, nontender.  EXTREM: no LE edema.  NEURO: Alert and oriented, cooperative.  SKIN: Warm and dry.        Data:   LIPID RESULTS:  Lab Results   Component Value Date    CHOL 87 08/13/2017    HDL 45 08/13/2017    LDL 35 08/13/2017    TRIG 33 08/13/2017     LIVER ENZYME RESULTS:  No results found for: \"AST\", \"ALT\"  CBC RESULTS:  Lab Results   Component Value Date    WBC 13.1 (H) 11/28/2022    WBC 7.1 07/24/2018    RBC 3.34 (L) 11/28/2022    RBC 3.57 (L) 07/24/2018    HGB 11.9 (L) 11/28/2022    HGB 12.3 (L) 07/24/2018    HCT 35.0 (L) 11/28/2022    HCT 35.3 (L) 07/24/2018     (H) 11/28/2022    MCV 99 07/24/2018    MCH 35.6 (H) 11/28/2022    MCH 34.5 (H) 07/24/2018    MCHC 34.0 11/28/2022    MCHC 34.8 07/24/2018    RDW 15.2 (H) 11/28/2022    RDW 14.2 07/24/2018     11/28/2022     07/24/2018     BMP RESULTS:  Lab Results   Component Value Date     11/28/2022     (L) 07/24/2018    POTASSIUM 4.3 11/28/2022    POTASSIUM 4.0 07/24/2018    CHLORIDE 105 11/28/2022    CHLORIDE 99 07/24/2018    CO2 27 11/28/2022    CO2 27 07/24/2018    ANIONGAP 3 11/28/2022    ANIONGAP 6 07/24/2018     (H) 11/28/2022    GLC 95 07/24/2018    BUN 29 11/28/2022    BUN 14 07/24/2018    CR 0.82 11/28/2022    CR 0.77 07/24/2018    GFRESTIMATED 81 11/28/2022    GFRESTIMATED >90 07/24/2018    GFRESTBLACK >90 07/24/2018    VIRGEN 8.9 11/28/2022    VIRGEN 8.2 (L) 07/24/2018      A1C RESULTS:  Lab Results   Component Value Date    A1C 5.5 08/13/2017     INR RESULTS:  Lab Results   Component Value Date    INR 1.11 08/13/2017            Medications     Current Outpatient Medications   Medication Sig Dispense Refill     acetaminophen (TYLENOL) 500 MG tablet Take 1,000 mg by mouth 4 times daily       atorvastatin (LIPITOR) 20 MG tablet Take 10 mg by mouth daily       carboxymethylcellulose PF " (REFRESH PLUS) 0.5 % ophthalmic solution Place 1 drop into both eyes 2 times daily as needed for dry eyes       Cholecalciferol (VITAMIN D3 PO) Take 1 tablet by mouth daily Unknown tablet strength       diclofenac (VOLTAREN) 1 % topical gel Apply topically 4 times daily as needed for moderate pain (4-6)       diltiazem (DILACOR XR) 180 MG 24 hr capsule Take 1 capsule (180 mg) by mouth daily 30 capsule 11     diphenhydrAMINE (BENADRYL) 25 MG capsule Take 25 mg by mouth At Bedtime       hydrocortisone 1 % CREA cream Apply topically 2 times daily as needed for itching       loratadine (CLARITIN) 10 MG tablet Take 10 mg by mouth daily as needed       LORazepam (ATIVAN) 0.5 MG tablet Take 0.5 mg by mouth every 8 hours as needed for anxiety       magnesium hydroxide (MILK OF MAGNESIA) 400 MG/5ML suspension Take 15 mLs by mouth daily as needed for constipation       metroNIDAZOLE (METROGEL) 1 % gel Apply topically daily applies to rosacea on nose (Patient not taking: Reported on 5/26/2023)       Multiple Vitamins-Minerals (PRESERVISION AREDS PO) Take 1 capsule by mouth daily       saline 0.9 % AERS Spray 1 spray in nostril 2 times daily as needed            Past Medical History     Past Medical History:   Diagnosis Date     Atrial fibrillation (H)      Hypertension      Past Surgical History:   Procedure Laterality Date     ORTHOPEDIC SURGERY      left ankle fusion     ORTHOPEDIC SURGERY      right knee replacement      No family history on file.         Allergies   Alendronate, Mold, Peanut-containing drug products, Pollen extract, and Risedronic acid [risedronate]    30 minutes spent on the date of the encounter doing chart review, history and exam, documentation and further activities as noted above    Anayeli Garcia PA-C  [facility]  Pager: [phone]    Corwin Puentes arrived here on 10/9/2024 3:07 PM for 3-7 Days  Zio monitor placement per ordering provider Anayeli Garcia for the diagnosis  Afib.  Patient s skin was prepped per protocol. Dr. Marie is the supervising MD.  Zio monitor was placed.  Instructions were reviewed with and given to the patient.  Patient verbalized understanding of wear, troubleshooting and monitor return instructions.         Thank you for allowing me to participate in the care of your patient.      Sincerely,     Anayeli Garcia PA-C     [facility]  cc:   Ken Sanchez MD  [address]  [place] [address]

## 2024-10-09 NOTE — PROGRESS NOTES
Cardiology Clinic Progress Note  Corwin Puentes MRN# [id number]   YOB: 1927 Age: 89 year old   Primary Cardiologist: Dr. Sanchez  Reason for visit: EP follow-up             Assessment and Plan:   Corwin Puentes is a very pleasant 89 year old male who is here today for EP follow-up.     1. SVT noted on event monitor 8/2019, with rates to 240 bpm. Dr. Sanchez discussed several options with patient including ablation, amiodarone and diltiazem. Diltiazem was pursued. No symptomatic recurrence per patient report.  2. Reported atrial fibrillation by EMS at the time of patient's syncopal episode 7/2018. There as been no documented atrial fibrillation since, thus, patient is not on chronic AC. ECG completed in clinic today reports atrial fibrillation - see discussion below.   3. Syncope 7/2018 while seated. No recurrence.  4. CVA in 2017 reported as a tiny occipital stroke, without hemorrhagic conversion  5. Infrarenal abdominal aortic aneurysm measuring 5 cm in greatest AP diameter on imaging 10/2023. Follows with Vascular at [facility] today: none    Mr. Puentes has overall been feeling well from a cardiac perspective since his last appointment. He denies any symptoms today.     ECG completed in clinic today is read as atrial flutter/fibrillation. However, there is significant baseline artifact, so much so that the ECG is difficult to interpret accurately. He has a history of frequent PACs, which may be what is making the rhythm irregular on ECG. Again, cannot determine rhythm accurately based on this ECG given artifact. We had a long discussion regarding possible atrial fibrillation and risk of CVA given his elevated CHADsVASC score of 4 (agex2, prior CVA). We agreed, after discussion, to pursue a 7 day Ziopatch monitor for further evaluation of rhythm. If this reveals definitive atrial fibrillation, we will then pursue AC initiation. If Ziopatch is unremarkable, Mr. Puentes will plan to follow up in 1  year.     A real time monitor because we wanted to place the monitor today in clinic, as to avoid the burden of setting up another appointment given patient's advanced age and mobility issues in the setting of his broken patella.     Anayeli Garcia PA-C  [facility]  Pager: [phone]          History of Presenting Illness:    Corwin Puentes is a very pleasant 89 year old male with a history of AAA (followed by vascular at [place]), CVA in 2017 (occipital stroke which was tiny and without hemorrhagic conversion), episode of syncope while seated 7/2018, and SVT noted on event monitor with rates up to 240 bpm.  The patient has been managed with diltiazem for the last several years without symptomatic recurrence.  He has only had episodes of nonsustained SVT noted on follow-up cardiac monitoring.      Of note, patient has a reported history of atrial fibrillation by EMS at the time of his syncopal episode in 2018.  He has had no documented atrial fibrillation during any hospitalization or on subsequent cardiac monitoring, as is therefore not on anticoagulation.    More recently, patient broke his patella.  As result, he has been wheelchair-bound.  He contacted the clinic 7/31 noting a 3-month history of chest tightness, wondering if it could be related to musculoskeletal issues given he was using a slide board for transfers.  He also reported some mild palpitations/sensation of heart racing.  He would also note the symptoms when rolling over.  He was recommended to present to clinic for further evaluation.  Several appointments have been canceled/rescheduled since then.      His most recent echocardiogram was completed 8/14/2017 that revealed LVEF 60 to 65%, mild MR.  Negative bubble study.    He now presents to clinic today for evaluation. Reports feeling overall well from a cardiac perspective. His symptoms he called about in July have resolved. Denies chest pain, SOB, palpitations,  lightheadedness, dizziness, near syncope or syncope. He is unfortunately wheelchair bound due to his broken patella. He has a home health aid available 24/7. Still lives with his wife.     Taking medications daily as prescribed. Blood pressure 120/82 and HR 82 in clinic today. ECG completed in clinic today is read as atrial flutter/fibrillation. However, there is significant baseline artifact, so much so that the ECG is difficult to interpret accurately.         Social History       Social History     Socioeconomic History    Marital status:      Spouse name: Not on file    Number of children: Not on file    Years of education: Not on file    Highest education level: Not on file   Occupational History    Not on file   Tobacco Use    Smoking status: Never    Smokeless tobacco: Never   Substance and Sexual Activity    Alcohol use: Yes     Comment: one a day    Drug use: No    Sexual activity: Never   Other Topics Concern    Parent/sibling w/ CABG, MI or angioplasty before 65F 55M? Not Asked   Social History Narrative    Not on file     Social Determinants of Health     Financial Resource Strain: Low Risk  (7/29/2022)    Received from [facility]    Overall Financial Resource Strain (CARDIA)     Difficulty of Paying Living Expenses: Not hard at all   Food Insecurity: No Food Insecurity (7/29/2022)    Received from [facility]    Hunger Vital Sign     Worried About Running Out of Food in the Last Year: Never true     Ran Out of Food in the Last Year: Never true   Transportation Needs: No Transportation Needs (7/29/2022)    Received from [facility]    PRAPARE - Transportation     Lack of Transportation (Medical): No     Lack of Transportation (Non-Medical): No   Physical Activity: Inactive (7/29/2022)    Received from [facility]    Exercise Vital Sign     Days of Exercise per Week: 0 days     Minutes of Exercise per Session: 0 min   Stress: No Stress Concern  Present (7/29/2022)    Received from [facility]    [place] [employer] - Occupational Stress Questionnaire     Feeling of Stress : Not at all   Social Connections: Moderately Isolated (7/29/2022)    [facility]    Social Connection and Isolation Panel [NHANES]     Frequency of Communication with Friends and Family: More than three times a week     Frequency of Social Gatherings with Friends and Family: More than three times a week     Attends Rastafarian Services: Never     Active Member of Clubs or Organizations: No     Attends Club or Organization Meetings: Never     Marital Status:    Interpersonal Safety: Not At Risk (7/29/2022)    Received from [facility]    Humiliation, Afraid, Rape, and Kick questionnaire     Fear of Current or Ex-Partner: No     Emotionally Abused: No     Physically Abused: No     Sexually Abused: No   Housing Stability: Low Risk  (7/29/2022)    [facility]    Housing Stability Vital Sign     Unable to Pay for Housing in the Last Year: No     Number of Places Lived in the Last Year: 2     Unstable Housing in the Last Year: No            Review of Systems:   Please see HPI         Physical Exam:   Vitals: /82 (BP Location: Left arm, Patient Position: Sitting, Cuff Size: Adult Regular)   Pulse 82   SpO2 98%    Wt Readings from Last 4 Encounters:   11/28/22 59.4 kg (131 lb)   10/28/21 60.1 kg (132 lb 9.6 oz)   08/23/20 56.7 kg (125 lb)   10/10/19 59.1 kg (130 lb 6.4 oz)     GEN: well nourished, in no acute distress.  HEENT:  Pupils equal, round. Sclerae nonicteric.   NECK: Supple, no masses appreciated. No JVD  C/V:  irregular rhythm; no murmur appreciated   RESP: Respirations are unlabored. Clear to auscultation bilaterally without wheezing, rales, or rhonchi.  GI: Abdomen soft, nontender.  EXTREM: no LE edema.  NEURO: Alert and oriented, cooperative.  SKIN: Warm and dry.         "Data:   LIPID RESULTS:  Lab Results   Component Value Date    CHOL 87 08/13/2017    HDL 45 08/13/2017    LDL 35 08/13/2017    TRIG 33 08/13/2017     LIVER ENZYME RESULTS:  No results found for: \"AST\", \"ALT\"  CBC RESULTS:  Lab Results   Component Value Date    WBC 13.1 (H) 11/28/2022    WBC 7.1 07/24/2018    RBC 3.34 (L) 11/28/2022    RBC 3.57 (L) 07/24/2018    HGB 11.9 (L) 11/28/2022    HGB 12.3 (L) 07/24/2018    HCT 35.0 (L) 11/28/2022    HCT 35.3 (L) 07/24/2018     (H) 11/28/2022    MCV 99 07/24/2018    MCH 35.6 (H) 11/28/2022    MCH 34.5 (H) 07/24/2018    MCHC 34.0 11/28/2022    MCHC 34.8 07/24/2018    RDW 15.2 (H) 11/28/2022    RDW 14.2 07/24/2018     11/28/2022     07/24/2018     BMP RESULTS:  Lab Results   Component Value Date     11/28/2022     (L) 07/24/2018    POTASSIUM 4.3 11/28/2022    POTASSIUM 4.0 07/24/2018    CHLORIDE 105 11/28/2022    CHLORIDE 99 07/24/2018    CO2 27 11/28/2022    CO2 27 07/24/2018    ANIONGAP 3 11/28/2022    ANIONGAP 6 07/24/2018     (H) 11/28/2022    GLC 95 07/24/2018    BUN 29 11/28/2022    BUN 14 07/24/2018    CR 0.82 11/28/2022    CR 0.77 07/24/2018    GFRESTIMATED 81 11/28/2022    GFRESTIMATED >90 07/24/2018    GFRESTBLACK >90 07/24/2018    VIRGEN 8.9 11/28/2022    VIRGEN 8.2 (L) 07/24/2018      A1C RESULTS:  Lab Results   Component Value Date    A1C 5.5 08/13/2017     INR RESULTS:  Lab Results   Component Value Date    INR 1.11 08/13/2017            Medications     Current Outpatient Medications   Medication Sig Dispense Refill    acetaminophen (TYLENOL) 500 MG tablet Take 1,000 mg by mouth 4 times daily      atorvastatin (LIPITOR) 20 MG tablet Take 10 mg by mouth daily      carboxymethylcellulose PF (REFRESH PLUS) 0.5 % ophthalmic solution Place 1 drop into both eyes 2 times daily as needed for dry eyes      Cholecalciferol (VITAMIN D3 PO) Take 1 tablet by mouth daily Unknown tablet strength      diclofenac (VOLTAREN) 1 % topical gel Apply " topically 4 times daily as needed for moderate pain (4-6)      diltiazem (DILACOR XR) 180 MG 24 hr capsule Take 1 capsule (180 mg) by mouth daily 30 capsule 11    diphenhydrAMINE (BENADRYL) 25 MG capsule Take 25 mg by mouth At Bedtime      hydrocortisone 1 % CREA cream Apply topically 2 times daily as needed for itching      loratadine (CLARITIN) 10 MG tablet Take 10 mg by mouth daily as needed      LORazepam (ATIVAN) 0.5 MG tablet Take 0.5 mg by mouth every 8 hours as needed for anxiety      magnesium hydroxide (MILK OF MAGNESIA) 400 MG/5ML suspension Take 15 mLs by mouth daily as needed for constipation      metroNIDAZOLE (METROGEL) 1 % gel Apply topically daily applies to rosacea on nose (Patient not taking: Reported on 5/26/2023)      Multiple Vitamins-Minerals (PRESERVISION AREDS PO) Take 1 capsule by mouth daily      saline 0.9 % AERS Spray 1 spray in nostril 2 times daily as needed            Past Medical History     Past Medical History:   Diagnosis Date    Atrial fibrillation (H)     Hypertension      Past Surgical History:   Procedure Laterality Date    ORTHOPEDIC SURGERY      left ankle fusion    ORTHOPEDIC SURGERY      right knee replacement      No family history on file.         Allergies   Alendronate, Mold, Peanut-containing drug products, Pollen extract, and Risedronic acid [risedronate]    30 minutes spent on the date of the encounter doing chart review, history and exam, documentation and further activities as noted above    Anayeli Garcia PA-C  [facility]  Pager: [phone]

## 2024-10-22 PROCEDURE — 93244 EXT ECG>48HR<7D REV&INTERPJ: CPT | Performed by: INTERNAL MEDICINE

## 2025-03-02 ENCOUNTER — HOSPITAL ENCOUNTER (EMERGENCY)
Facility: CLINIC | Age: OVER 89
Discharge: HOME | End: 2025-03-02
Attending: EMERGENCY MEDICINE | Admitting: EMERGENCY MEDICINE
Payer: MEDICARE

## 2025-03-02 VITALS
OXYGEN SATURATION: 99 % | HEIGHT: 68 IN | HEART RATE: 77 BPM | RESPIRATION RATE: 18 BRPM | SYSTOLIC BLOOD PRESSURE: 117 MMHG | TEMPERATURE: 97.6 F | BODY MASS INDEX: 18.94 KG/M2 | WEIGHT: 125 LBS | DIASTOLIC BLOOD PRESSURE: 66 MMHG

## 2025-03-02 DIAGNOSIS — K59.00 CONSTIPATION, UNSPECIFIED CONSTIPATION TYPE: ICD-10-CM

## 2025-03-02 DIAGNOSIS — I50.33 ACUTE ON CHRONIC HEART FAILURE WITH PRESERVED EJECTION FRACTION (H): ICD-10-CM

## 2025-03-02 DIAGNOSIS — R55 SYNCOPE, UNSPECIFIED SYNCOPE TYPE: ICD-10-CM

## 2025-03-02 DIAGNOSIS — I48.20 CHRONIC ATRIAL FIBRILLATION (H): ICD-10-CM

## 2025-03-02 LAB
ANION GAP SERPL CALCULATED.3IONS-SCNC: 12 MMOL/L (ref 7–15)
ATRIAL RATE - MUSE: NORMAL BPM
BASOPHILS # BLD AUTO: 0.1 10E3/UL (ref 0–0.2)
BASOPHILS NFR BLD AUTO: 1 %
BUN SERPL-MCNC: 29.2 MG/DL (ref 8–23)
CALCIUM SERPL-MCNC: 10.3 MG/DL (ref 8.8–10.4)
CHLORIDE SERPL-SCNC: 100 MMOL/L (ref 98–107)
CREAT SERPL-MCNC: 1.04 MG/DL (ref 0.67–1.17)
DIASTOLIC BLOOD PRESSURE - MUSE: NORMAL MMHG
EGFRCR SERPLBLD CKD-EPI 2021: 65 ML/MIN/1.73M2
EOSINOPHIL # BLD AUTO: 0.1 10E3/UL (ref 0–0.7)
EOSINOPHIL NFR BLD AUTO: 2 %
ERYTHROCYTE [DISTWIDTH] IN BLOOD BY AUTOMATED COUNT: 16.3 % (ref 10–15)
GLUCOSE SERPL-MCNC: 149 MG/DL (ref 70–99)
HCO3 SERPL-SCNC: 27 MMOL/L (ref 22–29)
HCT VFR BLD AUTO: 38.7 % (ref 40–53)
HGB BLD-MCNC: 13.1 G/DL (ref 13.3–17.7)
IMM GRANULOCYTES # BLD: 0.1 10E3/UL
IMM GRANULOCYTES NFR BLD: 1 %
INTERPRETATION ECG - MUSE: NORMAL
LYMPHOCYTES # BLD AUTO: 1.4 10E3/UL (ref 0.8–5.3)
LYMPHOCYTES NFR BLD AUTO: 15 %
MAGNESIUM SERPL-MCNC: 2.1 MG/DL (ref 1.7–2.3)
MCH RBC QN AUTO: 35.2 PG (ref 26.5–33)
MCHC RBC AUTO-ENTMCNC: 33.9 G/DL (ref 31.5–36.5)
MCV RBC AUTO: 104 FL (ref 78–100)
MONOCYTES # BLD AUTO: 0.8 10E3/UL (ref 0–1.3)
MONOCYTES NFR BLD AUTO: 8 %
NEUTROPHILS # BLD AUTO: 7 10E3/UL (ref 1.6–8.3)
NEUTROPHILS NFR BLD AUTO: 74 %
NRBC # BLD AUTO: 0 10E3/UL
NRBC BLD AUTO-RTO: 0 /100
P AXIS - MUSE: NORMAL DEGREES
PLATELET # BLD AUTO: 255 10E3/UL (ref 150–450)
POTASSIUM SERPL-SCNC: 4.5 MMOL/L (ref 3.4–5.3)
PR INTERVAL - MUSE: NORMAL MS
QRS DURATION - MUSE: 84 MS
QT - MUSE: 378 MS
QTC - MUSE: 444 MS
R AXIS - MUSE: 63 DEGREES
RBC # BLD AUTO: 3.72 10E6/UL (ref 4.4–5.9)
SODIUM SERPL-SCNC: 139 MMOL/L (ref 135–145)
SYSTOLIC BLOOD PRESSURE - MUSE: NORMAL MMHG
T AXIS - MUSE: 48 DEGREES
TROPONIN T SERPL HS-MCNC: 43 NG/L
TROPONIN T SERPL HS-MCNC: 45 NG/L
VENTRICULAR RATE- MUSE: 83 BPM
WBC # BLD AUTO: 9.5 10E3/UL (ref 4–11)

## 2025-03-02 PROCEDURE — 93005 ELECTROCARDIOGRAM TRACING: CPT

## 2025-03-02 PROCEDURE — 80048 BASIC METABOLIC PNL TOTAL CA: CPT | Performed by: EMERGENCY MEDICINE

## 2025-03-02 PROCEDURE — 82374 ASSAY BLOOD CARBON DIOXIDE: CPT | Performed by: EMERGENCY MEDICINE

## 2025-03-02 PROCEDURE — 84484 ASSAY OF TROPONIN QUANT: CPT | Performed by: EMERGENCY MEDICINE

## 2025-03-02 PROCEDURE — 85048 AUTOMATED LEUKOCYTE COUNT: CPT | Performed by: EMERGENCY MEDICINE

## 2025-03-02 PROCEDURE — 36415 COLL VENOUS BLD VENIPUNCTURE: CPT | Performed by: EMERGENCY MEDICINE

## 2025-03-02 PROCEDURE — 83735 ASSAY OF MAGNESIUM: CPT | Performed by: EMERGENCY MEDICINE

## 2025-03-02 PROCEDURE — 99284 EMERGENCY DEPT VISIT MOD MDM: CPT

## 2025-03-02 PROCEDURE — 85004 AUTOMATED DIFF WBC COUNT: CPT | Performed by: EMERGENCY MEDICINE

## 2025-03-02 ASSESSMENT — ACTIVITIES OF DAILY LIVING (ADL)
ADLS_ACUITY_SCORE: 46

## 2025-03-02 ASSESSMENT — COLUMBIA-SUICIDE SEVERITY RATING SCALE - C-SSRS
6. HAVE YOU EVER DONE ANYTHING, STARTED TO DO ANYTHING, OR PREPARED TO DO ANYTHING TO END YOUR LIFE?: NO
1. IN THE PAST MONTH, HAVE YOU WISHED YOU WERE DEAD OR WISHED YOU COULD GO TO SLEEP AND NOT WAKE UP?: NO
2. HAVE YOU ACTUALLY HAD ANY THOUGHTS OF KILLING YOURSELF IN THE PAST MONTH?: NO

## 2025-03-02 NOTE — ED TRIAGE NOTES
Patient BIBA after having syncopal event while having Bm on toilet, NA was able to lower patient to floor. Patient denies pain, did not hit head. No thinners, hx of afib.

## 2025-03-02 NOTE — ED NOTES
Transport here, patient in WC. Family will follow van back to home and help patient get into residence. AVS given to patient and family.

## 2025-03-02 NOTE — ED NOTES
Bed: ED11  Expected date:   Expected time:   Means of arrival:   Comments:  [phone] M syncope new on set afib [id number]

## 2025-03-02 NOTE — DISCHARGE INSTRUCTIONS
*You may resume diet and activities.  *Continue your current medications as prescribed.  No new medications.  *Follow-up with your primary care doctor in the next week.  *Return if you develop recurrent fainting, chest pain, abdominal pain, vomiting, fever or become worse in any way.    Discharge Instructions  Constipation  Constipation can cause severe cramping pain and your provider thinks this might be the cause of your abdominal pain (belly pain) today.  People usually recognize that they are constipated because they have difficulty having bowel movements, are not having bowel movements frequently enough, or are not having large enough bowel movements. Sometimes, especially in children or older people, you do not recognize that you are constipated until it becomes severe. The most common causes of constipation are a lack of exercise and not eating enough fruits, vegetables, and whole grains. Constipation can also be a side effect of medications, such as narcotics, or may be caused by a disease of the digestive system.    Generally, every Emergency Department visit should have a follow-up clinic visit with either a primary or a specialty clinic/provider. Please follow-up as instructed by your emergency provider today. Sometimes, chronic constipation requires further testing to determine the cause. If you are over 50 years old, you may need a colonoscopy if you have not had one before.     Return to the Emergency Department if:  Your abdominal pain worsens or does not improve after a bowel movement.  You become very weak.  You get a temperature above 102oF or as directed by your provider.  You have blood in your stools (bright red or black, tarry stools).  You keep vomiting (throwing up) or cannot drink liquids.  Your see blood when you vomit.  Your stomach gets bloated or bigger.  You have new symptoms or anything that worries you.    What can I do to help myself?  If your provider gave you a cathartic  medication, like magnesium citrate or GoLytely  (polyethylene glycol), you can expect to have cramps and gas pains after taking it. You can expect to have a number of bowel movements and even diarrhea (loose or watery stools) in the course of clearing your bowels.  You will know your bowels have been cleaned out after you pass clear liquid. The cramps and gas should let up after you have emptied your bowels. You may want to wait until morning to take this type of medication so you aren t up in the night.   Sometimes instead of cathartics, we recommend laxatives like milk of magnesia to move your bowels more slowly, or an enema to help the bowels to move. Read and follow the package directions, or follow your provider s instructions.  Once you have become very constipated, it takes time for your bowels to return to normal and you need to be very careful to prevent becoming constipated again. Take a laxative if you do not move your bowels at least every two days.     Eat foods that have a lot of fiber. Good choices are fruits, vegetables, prune juice, apple juice, and high fiber cereal. Limit dairy products such as milk and cheese, since these can make constipation worse.   Drink plenty of water.   When you feel the need to go to the bathroom, go to the bathroom. Do not hold it.  Miralax , Metamucil , Colace , Senna or fiber supplements can be used daily.  Miralax  daily is often the best choice for children.  If you were given a prescription for medicine here today, be sure to read all of the information (including the package insert) that comes with your prescription.  This will include important information about the medicine, its side effects, and any warnings that you need to know about.  The pharmacist who fills the prescription can provide more information and answer questions you may have about the medicine.  If you have questions or concerns that the pharmacist cannot address, please call or return to the  Emergency Department.   Remember that you can always come back to the Emergency Department if you are not able to see your regular provider in the amount of time listed above, if you get any new symptoms, or if there is anything that worries you.      Discharge Instructions  Syncope    Syncope (fainting) is a sudden, short loss of consciousness (passing out spell). People will usually fall to the ground when they faint or slump over if seated.  People may also shake when this happens, and it can sometimes be difficult to tell the difference between syncope and a seizure. At this time, your provider does not find a reason to suspect that your fainting spell is a sign of anything dangerous or life-threatening.  However, sometimes the signs of serious illness do not show up right away.     Generally, every Emergency Department visit should have a follow-up clinic visit with either a primary or a specialty clinic/provider. Please follow-up as instructed by your emergency provider today.    Return to the Emergency Department if:  You faint again.   You have any significant bleeding.  You have chest pain or a fast or irregular heartbeat.  You feel short of breath.  You cough up any blood.  You have abdominal (belly) pain or unusual back pain.  You have ongoing vomiting (throwing up) or diarrhea (loose stools).  You have a black or tarry bowel movement, or blood in the stool or in your vomit.  You have a fever over 101 F.  You lose feeling or cannot move a part of your body or cannot talk normally.  You are confused, have a headache, cannot see well, or have a seizure.  DO NOT DRIVE. CALL 911 INSTEAD!    What can I do to help myself?  Follow any specific instructions that your provider discussed with you.  If you feel light-headed, make sure to sit down right away, even if you have to sit on the floor.  Follow up with your regular medical provider as discussed for further management. This may include lowering your blood  pressure medications, insulin or other diabetic medications, checking your blood sugar more frequently, and drinking more fluids, taking medicines for vomiting or diarrhea or getting up slower.  If you were given a prescription for medicine here today, be sure to read all of the information (including the package insert) that comes with your prescription.  This will include important information about the medicine, its side effects, and any warnings that you need to know about.  The pharmacist who fills the prescription can provide more information and answer questions you may have about the medicine.  If you have questions or concerns that the pharmacist cannot address, please call or return to the Emergency Department.   Remember that you can always come back to the Emergency Department if you are not able to see your regular provider in the amount of time listed above, if you get any new symptoms, or if there is anything that worries you.

## 2025-03-02 NOTE — ED PROVIDER NOTES
Emergency Department Note      History of Present Illness     Chief Complaint   Syncope      HPI   Corwin Puentes is a 89 year old male with history of A-fib and hypertension who presents at the emergency department for evaluation of near syncope. EMS reports that the patient was at his assisted living center in [place] this morning, when he experienced an episode of near-syncope, as nurse assistants helped lower him to the floor. They note that he may have experienced loss of consciousness for 3-5 minutes. No head strike or injury. Corwin endorses history of A-fib. He denies hitting his head. He denies hematochezia, fever, cough, nausea, diarrhea, chest pain, numbness/weakness, headache, vision changes, shortness of breath, loss of appetite, or slurred speech. The patient states that he is not on blood thinners. Of note, Corwin is wearing a brace on his right lower extremity and has worn this for many years.  He does report that he has worked with his cardiologist Dr. Sanchez and is rate controlled on diltiazem.  He has made the decision in conjunction with Dr. Adam coles not to be on anticoagulation for his atrial fibrillation.    Independent Historian   EMS as detailed above.    Review of External Notes   I reviewed the telephone triage notes with Dr. Sanchez his cardiologist on July 2024.  [facility]  I reviewed the patient's nursing facility paperwork.    Past Medical History     Medical History and Problem List   A-fib  Hypertension   Hyperlipidemia  Sepsis  Squamous cell carcinoma  Anemia  Hypothyroidism  CAD  Insomnia  Osteoarthritis  Abdominal aortic aneurysm     Medications   Lipitor  Dilacor  Ativan  Asprin 325mg  Atorvastatin  Keflex  Levaquin  Toprol  Roxicodone  Ambien  Senokot    Surgical History   Right knee arthroplasty  Left ankle surgery    Physical Exam     Patient Vitals for the past 24 hrs:   BP Temp Temp src Pulse Resp SpO2 Height Weight   03/02/25 1600  "117/66 -- -- 77 -- -- -- --   03/02/25 1500 106/61 -- -- 79 -- 99 % -- --   03/02/25 1400 110/73 -- -- 77 -- -- -- --   03/02/25 1300 106/66 -- -- 74 -- -- -- --   03/02/25 1200 101/63 -- -- 68 -- -- -- --   03/02/25 1133 109/79 97.6  F (36.4  C) Oral 81 18 98 % 1.727 m (5' 8\") 56.7 kg (125 lb)     Physical Exam  General: Well-nourished, appears younger than stated age  Eyes: PERRL, conjunctivae pink no scleral icterus or conjunctival injection  ENT:  Moist mucus membranes, posterior oropharynx clear without erythema or exudates  Respiratory:  Lungs clear to auscultation bilaterally, no crackles/rubs/wheezes.  Good air movement  CV: Normal rate and rhythm, no murmurs/rubs/gallops  GI:  Abdomen soft and non-distended.  Normoactive BS.  No tenderness, guarding or rebound  Skin: Warm, dry.  No rashes or petechiae  Musculoskeletal: No peripheral edema or calf tenderness  Neuro: Alert and oriented to person/place/time. PERRL, EOMI no nystagmus, no aphasia/facial droop/dysarthria, tongue midline, symmetric palatal elevation, normal strength at SCM/trapezius/BUE/BLE, normal coordination to FNF at BUE, gait deferred, negative romberg, sensation intact to LT over face/BUE/BLE  Psychiatric: Normal affect      Diagnostics     Lab Results   Labs Ordered and Resulted from Time of ED Arrival to Time of ED Departure   BASIC METABOLIC PANEL - Abnormal       Result Value    Sodium 139      Potassium 4.5      Chloride 100      Carbon Dioxide (CO2) 27      Anion Gap 12      Urea Nitrogen 29.2 (*)     Creatinine 1.04      GFR Estimate 65      Calcium 10.3      Glucose 149 (*)    TROPONIN T, HIGH SENSITIVITY - Abnormal    Troponin T, High Sensitivity 45 (*)    CBC WITH PLATELETS AND DIFFERENTIAL - Abnormal    WBC Count 9.5      RBC Count 3.72 (*)     Hemoglobin 13.1 (*)     Hematocrit 38.7 (*)      (*)     MCH 35.2 (*)     MCHC 33.9      RDW 16.3 (*)     Platelet Count 255      % Neutrophils 74      % Lymphocytes 15      % " Monocytes 8      % Eosinophils 2      % Basophils 1      % Immature Granulocytes 1      NRBCs per 100 WBC 0      Absolute Neutrophils 7.0      Absolute Lymphocytes 1.4      Absolute Monocytes 0.8      Absolute Eosinophils 0.1      Absolute Basophils 0.1      Absolute Immature Granulocytes 0.1      Absolute NRBCs 0.0     TROPONIN T, HIGH SENSITIVITY - Abnormal    Troponin T, High Sensitivity 43 (*)    MAGNESIUM - Normal    Magnesium 2.1         Imaging   No orders to display       EKG   ECG results from 03/02/25   EKG 12-lead, tracing only     Value    Systolic Blood Pressure     Diastolic Blood Pressure     Ventricular Rate 83    Atrial Rate     MT Interval     QRS Duration 84        QTc 444    P Axis     R AXIS 63    T Axis 48    Interpretation ECG      Atrial fibrillation with premature ventricular or aberrantly conducted complexes  Abnormal ECG  When compared with ECG of 28-Nov-2022 16:11,  Atrial fibrillation has replaced Junctional rhythm  ST no longer depressed in Anterior leads  Confirmed by GENERATED REPORT, COMPUTER ([id number]),  DEVYN BERNAL ([id number]) on 3/2/2025 11:38:34 AM         Independent Interpretation   None    ED Course      Medications Administered   Medications - No data to display    Procedures   Procedures     Discussion of Management   None    ED Course   ED Course as of 03/02/25 1824   Sun Mar 02, 2025   1133 I obtained history and examined the patient as noted above     1534 I updated the patient and his son.  Offered observation admission on telemetry versus discharge home.  Patient would like to be discharged home.  He feels fine.  He is eating and drinking okay.  He reports that he is not supposed to be on blood thinner medications for his atrial fibrillation and he would not start them at this time.  He reports he takes diltiazem and consultation with cardiology.  Son is comfortable with the plan as well.  We will arrange for transport as patient requires a wheelchair on Sunday and  able to transport him via wheelchair.       Additional Documentation  None    Medical Decision Making / Diagnosis       [facility]   Corwin Puentes is a delightful 89 year old male who had an episode of syncope while sitting on the toilet and straining to have a bowel movement as he has been constipated.  On examination is quite well-appearing.  Abdomen is completely benign.  Troponin is flat with no significant elevation.  Does not appear to be in congestive heart failure.  Has no neurologic symptoms.  No belly pain whatsoever.He has had no malignant arrhthmias on the monitor during his time of observation in the emergency department.  No black or bloody stools and he is eating and drinking here.  He is in A-fib but is rate controlled on his home diltiazem.  The decision regarding anticoagulation is already been made and so anticoagulation is not indicated.  Fortunately no signs of trauma.  I suspect this was related to vasovagal with straining to have a bowel movement.  Observation hospitalization was offered given his age and risk factors.  He declines.  I think this is completely reasonable but he is asked to return if any recurrent syncope or worsening symptoms.    Disposition   The patient was discharged.     Diagnosis     ICD-10-CM    1. Syncope, unspecified syncope type  R55       2. Constipation, unspecified constipation type  K59.00       3. Chronic atrial fibrillation (H)  I48.20            Discharge Medications   Discharge Medication List as of 3/2/2025  3:41 PM            Scribe Disclosure:  Annette WASHINGTON, am serving as a scribe at 12:36 PM on 3/2/2025 to document services personally performed by Ronda Dinh MD based on my observations and the provider's statements to me.        Ronda Dinh MD  03/03/25 0010

## 2025-06-14 ENCOUNTER — APPOINTMENT (OUTPATIENT)
Dept: CT IMAGING | Facility: CLINIC | Age: OVER 89
End: 2025-06-14
Attending: EMERGENCY MEDICINE
Payer: MEDICARE

## 2025-06-14 ENCOUNTER — HOSPITAL ENCOUNTER (EMERGENCY)
Facility: CLINIC | Age: OVER 89
Discharge: HOME | End: 2025-06-14
Attending: EMERGENCY MEDICINE | Admitting: EMERGENCY MEDICINE
Payer: MEDICARE

## 2025-06-14 VITALS
BODY MASS INDEX: 18.94 KG/M2 | DIASTOLIC BLOOD PRESSURE: 75 MMHG | HEART RATE: 82 BPM | HEIGHT: 68 IN | OXYGEN SATURATION: 99 % | SYSTOLIC BLOOD PRESSURE: 119 MMHG | WEIGHT: 125 LBS | RESPIRATION RATE: 15 BRPM | TEMPERATURE: 97.1 F

## 2025-06-14 DIAGNOSIS — J90 PLEURAL EFFUSION ON LEFT: ICD-10-CM

## 2025-06-14 DIAGNOSIS — R55 SYNCOPE AND COLLAPSE: Primary | ICD-10-CM

## 2025-06-14 LAB
ANION GAP SERPL CALCULATED.3IONS-SCNC: 8 MMOL/L (ref 7–15)
ATRIAL RATE - MUSE: NORMAL BPM
BASOPHILS # BLD AUTO: 0 10E3/UL (ref 0–0.2)
BASOPHILS NFR BLD AUTO: 1 %
BUN SERPL-MCNC: 22.5 MG/DL (ref 8–23)
CALCIUM SERPL-MCNC: 9.8 MG/DL (ref 8.8–10.4)
CHLORIDE SERPL-SCNC: 100 MMOL/L (ref 98–107)
CREAT SERPL-MCNC: 0.95 MG/DL (ref 0.67–1.17)
D DIMER PPP FEU-MCNC: 2.41 UG/ML FEU (ref 0–0.5)
DIASTOLIC BLOOD PRESSURE - MUSE: NORMAL MMHG
EGFRCR SERPLBLD CKD-EPI 2021: 72 ML/MIN/1.73M2
EOSINOPHIL # BLD AUTO: 0.1 10E3/UL (ref 0–0.7)
EOSINOPHIL NFR BLD AUTO: 1 %
ERYTHROCYTE [DISTWIDTH] IN BLOOD BY AUTOMATED COUNT: 16.6 % (ref 10–15)
GLUCOSE SERPL-MCNC: 139 MG/DL (ref 70–99)
HCO3 SERPL-SCNC: 26 MMOL/L (ref 22–29)
HCT VFR BLD AUTO: 34.4 % (ref 40–53)
HGB BLD-MCNC: 11.8 G/DL (ref 13.3–17.7)
HOLD SPECIMEN: NORMAL
IMM GRANULOCYTES # BLD: 0.1 10E3/UL
IMM GRANULOCYTES NFR BLD: 1 %
INTERPRETATION ECG - MUSE: NORMAL
LYMPHOCYTES # BLD AUTO: 0.9 10E3/UL (ref 0.8–5.3)
LYMPHOCYTES NFR BLD AUTO: 12 %
MAGNESIUM SERPL-MCNC: 1.9 MG/DL (ref 1.7–2.3)
MCH RBC QN AUTO: 35 PG (ref 26.5–33)
MCHC RBC AUTO-ENTMCNC: 34.3 G/DL (ref 31.5–36.5)
MCV RBC AUTO: 102 FL (ref 78–100)
MONOCYTES # BLD AUTO: 0.6 10E3/UL (ref 0–1.3)
MONOCYTES NFR BLD AUTO: 8 %
NEUTROPHILS # BLD AUTO: 5.8 10E3/UL (ref 1.6–8.3)
NEUTROPHILS NFR BLD AUTO: 77 %
NRBC # BLD AUTO: 0 10E3/UL
NRBC BLD AUTO-RTO: 0 /100
P AXIS - MUSE: NORMAL DEGREES
PLATELET # BLD AUTO: 233 10E3/UL (ref 150–450)
POTASSIUM SERPL-SCNC: 4.3 MMOL/L (ref 3.4–5.3)
PR INTERVAL - MUSE: NORMAL MS
QRS DURATION - MUSE: 82 MS
QT - MUSE: 404 MS
QTC - MUSE: 436 MS
R AXIS - MUSE: 65 DEGREES
RBC # BLD AUTO: 3.37 10E6/UL (ref 4.4–5.9)
SODIUM SERPL-SCNC: 134 MMOL/L (ref 135–145)
SYSTOLIC BLOOD PRESSURE - MUSE: NORMAL MMHG
T AXIS - MUSE: -3 DEGREES
T4 FREE SERPL-MCNC: 1.19 NG/DL (ref 0.9–1.7)
TROPONIN T SERPL HS-MCNC: 48 NG/L
TROPONIN T SERPL HS-MCNC: 52 NG/L
TSH SERPL DL<=0.005 MIU/L-ACNC: 7.88 UIU/ML (ref 0.3–4.2)
VENTRICULAR RATE- MUSE: 70 BPM
WBC # BLD AUTO: 7.6 10E3/UL (ref 4–11)

## 2025-06-14 PROCEDURE — 70450 CT HEAD/BRAIN W/O DYE: CPT

## 2025-06-14 PROCEDURE — 99285 EMERGENCY DEPT VISIT HI MDM: CPT | Mod: 25

## 2025-06-14 PROCEDURE — 84439 ASSAY OF FREE THYROXINE: CPT | Performed by: EMERGENCY MEDICINE

## 2025-06-14 PROCEDURE — 85379 FIBRIN DEGRADATION QUANT: CPT | Performed by: EMERGENCY MEDICINE

## 2025-06-14 PROCEDURE — 250N000011 HC RX IP 250 OP 636: Performed by: EMERGENCY MEDICINE

## 2025-06-14 PROCEDURE — 84484 ASSAY OF TROPONIN QUANT: CPT | Performed by: EMERGENCY MEDICINE

## 2025-06-14 PROCEDURE — 85004 AUTOMATED DIFF WBC COUNT: CPT | Performed by: EMERGENCY MEDICINE

## 2025-06-14 PROCEDURE — 80048 BASIC METABOLIC PNL TOTAL CA: CPT | Performed by: EMERGENCY MEDICINE

## 2025-06-14 PROCEDURE — 84443 ASSAY THYROID STIM HORMONE: CPT | Performed by: EMERGENCY MEDICINE

## 2025-06-14 PROCEDURE — 36415 COLL VENOUS BLD VENIPUNCTURE: CPT | Performed by: EMERGENCY MEDICINE

## 2025-06-14 PROCEDURE — 83735 ASSAY OF MAGNESIUM: CPT | Performed by: EMERGENCY MEDICINE

## 2025-06-14 PROCEDURE — 93005 ELECTROCARDIOGRAM TRACING: CPT

## 2025-06-14 PROCEDURE — 85025 COMPLETE CBC W/AUTO DIFF WBC: CPT | Performed by: EMERGENCY MEDICINE

## 2025-06-14 PROCEDURE — 250N000009 HC RX 250: Performed by: EMERGENCY MEDICINE

## 2025-06-14 PROCEDURE — 71275 CT ANGIOGRAPHY CHEST: CPT

## 2025-06-14 RX ORDER — IOPAMIDOL 755 MG/ML
57 INJECTION, SOLUTION INTRAVASCULAR ONCE
Status: COMPLETED | OUTPATIENT
Start: 2025-06-14 | End: 2025-06-14

## 2025-06-14 RX ADMIN — IOPAMIDOL 57 ML: 755 INJECTION, SOLUTION INTRAVENOUS at 13:51

## 2025-06-14 RX ADMIN — SODIUM CHLORIDE 84 ML: 9 INJECTION, SOLUTION INTRAVENOUS at 13:51

## 2025-06-14 ASSESSMENT — ACTIVITIES OF DAILY LIVING (ADL)
ADLS_ACUITY_SCORE: 46

## 2025-06-14 ASSESSMENT — COLUMBIA-SUICIDE SEVERITY RATING SCALE - C-SSRS
1. IN THE PAST MONTH, HAVE YOU WISHED YOU WERE DEAD OR WISHED YOU COULD GO TO SLEEP AND NOT WAKE UP?: NO
2. HAVE YOU ACTUALLY HAD ANY THOUGHTS OF KILLING YOURSELF IN THE PAST MONTH?: NO
6. HAVE YOU EVER DONE ANYTHING, STARTED TO DO ANYTHING, OR PREPARED TO DO ANYTHING TO END YOUR LIFE?: NO

## 2025-06-14 NOTE — ED PROVIDER NOTES
Emergency Department Note      History of Present Illness     Chief Complaint   Syncope      HPI   Corwin Puentes is a 89 year old male with history of atrial fibrillation, hypertension, and hyperlipidemia who presents to the ED via EMS with his son for evaluation of syncope. The patient reports that he has been dealing with constipation issues recently. He states that normally his full-time aide places a cold wash cloth on his head which helps him loosen his stools. Today, he was using that cloth and had a bowel movement. Following the bowel movement, the patient became dizzy and had a syncopal episode. Patient's son states that he was unconscious for about 10 minutes. Patient's son and the aide lowered him to the ground from the toilet. He notes that he had a near syncopal episode about 3 months ago. Denies abdominal pain, chest pain, shortness of breath, headache, or vision changes. His son denies any droop or language difficulty.    Independent Historian   Son as detailed above.    Review of External Notes   I reviewed patient's cardiology clinic note from 10/9/2024 with GIANA Garcia.  Patient with a history of SVT from event monitor in 2019.  Patient also with a history of atrial fibrillation as a cause of a syncopal event in July 2018.  He is not on chronic anticoagulation.  Had a syncopal event on 7/18 while seated no history of recurrence.  Also with history of stroke in 2017 and infrarenal abdominal aortic aneurysm ~5cm in diameter.    Past Medical History     Medical History and Problem List   Atrial fibrillation  Hypertension   Hyperlipidemia  Sepsis  Squamous cell carcinoma  Anemia  Hypothyroidism  CAD  Insomnia  Osteoarthritis  Abdominal aortic aneurysm     Medications   Lipitor  Dilacor  Ativan  Asprin 325mg  Atorvastatin  Keflex  Levaquin  Toprol  Roxicodone  Ambien  Senokot    Surgical History   Right knee arthroplasty  Left ankle surgery    Physical Exam     Patient Vitals for the past 24  "hrs:   BP Temp Temp src Pulse Resp SpO2 Height Weight   06/14/25 1601 -- -- -- -- 13 -- -- --   06/14/25 1600 110/81 -- -- 66 16 99 % -- --   06/14/25 1530 91/66 -- -- 57 13 99 % -- --   06/14/25 1500 104/72 -- -- 69 11 99 % -- --   06/14/25 1430 97/61 -- -- 63 13 99 % -- --   06/14/25 1400 93/60 -- -- 67 12 98 % -- --   06/14/25 1330 98/67 -- -- 60 18 98 % -- --   06/14/25 1300 95/55 97.1  F (36.2  C) Temporal 69 18 100 % -- --   06/14/25 1222 102/67 -- -- 65 20 98 % 1.727 m (5' 8\") 56.7 kg (125 lb)     Physical Exam  General: Alert, appears frail and elderly. Cooperative.     In mild distress  HEENT:  Head:  Atraumatic  Ears:  External ears are normal  Mouth/Throat:  Oropharynx is without erythema or exudate and mucous membranes are moist.   Eyes:   Conjunctivae normal and EOM are normal. No scleral icterus.    Pupils are equal, round, and reactive to light.   Neck:   Normal range of motion. Neck supple.  CV:  Normal rate, irregular rhythm, normal heart sounds and radial pulses are 2+ and symmetric.  No murmur.  Resp:  Breath sounds are clear bilaterally    Non-labored, no retractions or accessory muscle use  GI:  Abdomen is soft, no distension, no tenderness. No rebound or guarding.  No CVA tenderness bilaterally  MS:  Normal range of motion for patient.  RLE in brace for years. No edema.    Back atraumatic.    No midline cervical, thoracic, or lumbar tenderness  Skin:  Warm and dry.  No rash or lesions noted.  Neuro:   Alert. Globally weak, baseline for patient.  GCS: 15  Psych: Normal mood and affect.    Diagnostics     Lab Results   Labs Ordered and Resulted from Time of ED Arrival to Time of ED Departure   BASIC METABOLIC PANEL - Abnormal       Result Value    Sodium 134 (*)     Potassium 4.3      Chloride 100      Carbon Dioxide (CO2) 26      Anion Gap 8      Urea Nitrogen 22.5      Creatinine 0.95      GFR Estimate 72      Calcium 9.8      Glucose 139 (*)    CBC WITH PLATELETS AND DIFFERENTIAL - Abnormal    " WBC Count 7.6      RBC Count 3.37 (*)     Hemoglobin 11.8 (*)     Hematocrit 34.4 (*)      (*)     MCH 35.0 (*)     MCHC 34.3      RDW 16.6 (*)     Platelet Count 233      % Neutrophils 77      % Lymphocytes 12      % Monocytes 8      % Eosinophils 1      % Basophils 1      % Immature Granulocytes 1      NRBCs per 100 WBC 0      Absolute Neutrophils 5.8      Absolute Lymphocytes 0.9      Absolute Monocytes 0.6      Absolute Eosinophils 0.1      Absolute Basophils 0.0      Absolute Immature Granulocytes 0.1      Absolute NRBCs 0.0     TROPONIN T, HIGH SENSITIVITY - Abnormal    Troponin T, High Sensitivity 52 (*)    TSH WITH FREE T4 REFLEX - Abnormal    TSH 7.88 (*)    D DIMER QUANTITATIVE - Abnormal    D-Dimer Quantitative 2.41 (*)    TROPONIN T, HIGH SENSITIVITY - Abnormal    Troponin T, High Sensitivity 48 (*)    MAGNESIUM - Normal    Magnesium 1.9     T4 FREE - Normal    Free T4 1.19         Imaging   CT Chest Pulmonary Embolism w Contrast   Final Result   IMPRESSION:   1.  No evidence for pulmonary emboli.   2.  Small left pleural effusion, with subsegmental atelectasis and air trapping both lower lobes.   3.  Atherosclerotic disease, with 4.3 cm aneurysm involving the aortic arch.      CT Head w/o Contrast   Final Result   IMPRESSION:     1.  No evidence of acute intracranial hemorrhage or mass effect.   2.  Moderate nonspecific white matter changes.   3.  Moderate brain parenchymal volume loss.          EKG   ECG taken at 12:22, ECG read at 01:18  Atrial fibrillation with premature ventricular or aberrantly conducted complexes  Abnormal QRS-T angle, consider primary T wave abnormality   No significant change as compared to prior, dated 3/2/25.  Rate 70 bpm. MT interval * ms. QRS duration 82 ms. QT/QTc 404/436 ms. P-R-T axes * 65 -3.    Independent Interpretation   CT Head: No intracranial hemorrhage.    ED Course      Medications Administered   Medications   iopamidol (ISOVUE-370) solution 57 mL (57 mLs  Intravenous $Given 6/14/25 1351)   sodium chloride 0.9 % bag for CT scan flush (84 mLs As instructed $Given 6/14/25 1351)       Procedures   None     Discussion of Management   None    ED Course   ED Course as of 06/14/25 1628   Sat Jun 14, 2025   1317 I obtained history and examined the patient as noted above.    1627 I rechecked and updated the patient. Patient prefers to be discharged.       Additional Documentation  None    Medical Decision Making / Diagnosis     CMS Diagnoses: None    MIPS   CT for PE was ordered because the patient is high risk for pulmonary embolism.    STEFANIE Puentes is a 89 year old male who presents with syncope and a loss of consciousness episode lasting approximately 10 minutes.  His son presents with him here today.  Patient has had syncope in the past associated with episodes of atrial fibrillation.  He does appear to be in atrial fibrillation but is rate controlled here today.  He is not anticoagulated per prior cardiology notes.  Given the syncopal event we did obtain CT imaging of the head including CT imaging of the chest to evaluate for potential pulmonary embolism or other sinister intrathoracic abnormalities.  Thankfully no evidence of pulmonary embolism.  There is a small left-sided pleural effusion which is likely incidental and findings.  I think unlikely related to the patient's syncopal event today.  Noted atherosclerotic disease as well with a aneurysm involving the aortic arch, this had been seen historically on prior CTA imaging (measured about 40mm at that time).  Patient had laboratory work performed which appears baseline for the patient.  His anemia is chronic in nature and hemoglobin of 11.8 today.  No hematemesis or blood in stool.  Magnesium and thyroid function within normal range.  Patient's troponin studies have been detectable but nondynamic and low suspicion for ACS particularly given no ischemic changes on EKG or reports of chest pain on evaluation.   Given the high risk presentation of syncope I did recommend observation admission to the hospital.  Patient would much prefer to discharge home and at this time I think it is reasonable given the patient has clear insight into his presentation today and understands that we could be missing a sinister pathology that could be a cause of his syncope today such as cardiac arrhythmia.  He would prefer to discharge home and sleep in his own bed tonight and I think this is a reasonable option for the patient.  He certainly can return to the emergency department with any new concerning symptoms or recurrent syncopal events at home.  Son in agreement with plan at this time.  I would like him to follow-up with his primary care doctor or cardiologist in the outpatient setting in the near future.  After return precautions understood and all questions answered, discharged home.    Disposition   The patient was discharged.     Diagnosis     ICD-10-CM    1. Syncope and collapse  R55       2. Pleural effusion on left  J90         Scribe Disclosure:  I, Yang Abdul, am serving as a scribe at 1:16 PM on 6/14/2025 to document services personally performed by Gumaro Daniel MD based on my observations and the provider's statements to me.        Gumaro Daniel MD  06/14/25 [id number]

## 2025-06-14 NOTE — ED NOTES
Staff boosted pt higher in bed and raised head of bed, giving pt hospital courtesy meal. Son at bedside says last time wheelchair transport home did not go well, as pt is a difficult transfer and requires a sliding board to wheelchair, unable to really bear weight on his legs for any length of time. Stretcher transport will be ordered for patient to bring him home.

## 2025-06-14 NOTE — ED TRIAGE NOTES
Pt BIB EMS, from home with wife in [place], after having a syncopal/vasovagal episode on the toilet today.    Pt with hx of constipation was on the toilet, straining to have a BM. Pt fainted and caregiver lowered pt to the floor. Pt did not hit head. Pt lost consciousness for a few minutes, breathing regular and unlabored. Similar incidents have happened in the past. Upon arrival to ED, pt alert and oriented.    With EMS, SBP 80s-90s mmHg, afib 60-80s bpm. SpO2 99% on RA. .     Pt takes diltiazem and atorvastatin. NKA. Hx R knee fracture and pain. WW[employer] . Pt's son is en route to hospital, his number is [phone].

## 2025-06-14 NOTE — ED NOTES
Bed: ED03  Expected date:   Expected time:   Means of arrival:   Comments:  [address] 89 M syncope bradycardia eta 1200

## 2025-07-06 ENCOUNTER — HOSPITAL ENCOUNTER (EMERGENCY)
Facility: CLINIC | Age: OVER 89
Discharge: HOME | End: 2025-07-06
Attending: EMERGENCY MEDICINE | Admitting: EMERGENCY MEDICINE
Payer: MEDICARE

## 2025-07-06 VITALS
WEIGHT: 125 LBS | BODY MASS INDEX: 18.94 KG/M2 | RESPIRATION RATE: 16 BRPM | HEART RATE: 79 BPM | HEIGHT: 68 IN | TEMPERATURE: 97.5 F | SYSTOLIC BLOOD PRESSURE: 114 MMHG | DIASTOLIC BLOOD PRESSURE: 74 MMHG | OXYGEN SATURATION: 99 %

## 2025-07-06 DIAGNOSIS — B02.9 HERPES ZOSTER WITHOUT COMPLICATION: ICD-10-CM

## 2025-07-06 LAB
ALBUMIN UR-MCNC: NEGATIVE MG/DL
ANION GAP SERPL CALCULATED.3IONS-SCNC: 11 MMOL/L (ref 7–15)
APPEARANCE UR: CLEAR
BASOPHILS # BLD AUTO: 0 10E3/UL (ref 0–0.2)
BASOPHILS NFR BLD AUTO: 1 %
BILIRUB UR QL STRIP: NEGATIVE
BUN SERPL-MCNC: 26.9 MG/DL (ref 8–23)
CALCIUM SERPL-MCNC: 9.7 MG/DL (ref 8.8–10.4)
CHLORIDE SERPL-SCNC: 100 MMOL/L (ref 98–107)
COLOR UR AUTO: YELLOW
CREAT SERPL-MCNC: 0.99 MG/DL (ref 0.67–1.17)
EGFRCR SERPLBLD CKD-EPI 2021: 69 ML/MIN/1.73M2
EOSINOPHIL # BLD AUTO: 0.1 10E3/UL (ref 0–0.7)
EOSINOPHIL NFR BLD AUTO: 1 %
ERYTHROCYTE [DISTWIDTH] IN BLOOD BY AUTOMATED COUNT: 16.7 % (ref 10–15)
GLUCOSE SERPL-MCNC: 124 MG/DL (ref 70–99)
GLUCOSE UR STRIP-MCNC: NEGATIVE MG/DL
HCO3 SERPL-SCNC: 26 MMOL/L (ref 22–29)
HCT VFR BLD AUTO: 34.7 % (ref 40–53)
HGB BLD-MCNC: 12.1 G/DL (ref 13.3–17.7)
HGB UR QL STRIP: NEGATIVE
IMM GRANULOCYTES # BLD: 0 10E3/UL
IMM GRANULOCYTES NFR BLD: 1 %
KETONES UR STRIP-MCNC: NEGATIVE MG/DL
LEUKOCYTE ESTERASE UR QL STRIP: NEGATIVE
LYMPHOCYTES # BLD AUTO: 1.2 10E3/UL (ref 0.8–5.3)
LYMPHOCYTES NFR BLD AUTO: 16 %
MCH RBC QN AUTO: 35.3 PG (ref 26.5–33)
MCHC RBC AUTO-ENTMCNC: 34.9 G/DL (ref 31.5–36.5)
MCV RBC AUTO: 101 FL (ref 78–100)
MONOCYTES # BLD AUTO: 0.8 10E3/UL (ref 0–1.3)
MONOCYTES NFR BLD AUTO: 11 %
NEUTROPHILS # BLD AUTO: 5.2 10E3/UL (ref 1.6–8.3)
NEUTROPHILS NFR BLD AUTO: 71 %
NITRATE UR QL: NEGATIVE
NRBC # BLD AUTO: 0 10E3/UL
NRBC BLD AUTO-RTO: 0 /100
PH UR STRIP: 6.5 [PH] (ref 5–7)
PLATELET # BLD AUTO: 242 10E3/UL (ref 150–450)
POTASSIUM SERPL-SCNC: 4.2 MMOL/L (ref 3.4–5.3)
RBC # BLD AUTO: 3.43 10E6/UL (ref 4.4–5.9)
RBC URINE: 1 /HPF
SODIUM SERPL-SCNC: 137 MMOL/L (ref 135–145)
SP GR UR STRIP: 1.02 (ref 1–1.03)
UROBILINOGEN UR STRIP-MCNC: NORMAL MG/DL
WBC # BLD AUTO: 7.3 10E3/UL (ref 4–11)
WBC URINE: 1 /HPF

## 2025-07-06 PROCEDURE — 81001 URINALYSIS AUTO W/SCOPE: CPT | Performed by: EMERGENCY MEDICINE

## 2025-07-06 PROCEDURE — 99283 EMERGENCY DEPT VISIT LOW MDM: CPT

## 2025-07-06 PROCEDURE — 85004 AUTOMATED DIFF WBC COUNT: CPT | Performed by: EMERGENCY MEDICINE

## 2025-07-06 PROCEDURE — 250N000013 HC RX MED GY IP 250 OP 250 PS 637: Performed by: EMERGENCY MEDICINE

## 2025-07-06 PROCEDURE — 82374 ASSAY BLOOD CARBON DIOXIDE: CPT | Performed by: EMERGENCY MEDICINE

## 2025-07-06 PROCEDURE — 36415 COLL VENOUS BLD VENIPUNCTURE: CPT | Performed by: EMERGENCY MEDICINE

## 2025-07-06 RX ORDER — VALACYCLOVIR HYDROCHLORIDE 1 G/1
1000 TABLET, FILM COATED ORAL ONCE
Status: COMPLETED | OUTPATIENT
Start: 2025-07-06 | End: 2025-07-06

## 2025-07-06 RX ORDER — VALACYCLOVIR HYDROCHLORIDE 1 G/1
1000 TABLET, FILM COATED ORAL 2 TIMES DAILY
Qty: 14 TABLET | Refills: 0 | Status: SHIPPED | OUTPATIENT
Start: 2025-07-06 | End: 2025-07-13

## 2025-07-06 RX ADMIN — VALACYCLOVIR HYDROCHLORIDE 1000 MG: 1 TABLET, FILM COATED ORAL at 11:27

## 2025-07-06 ASSESSMENT — ACTIVITIES OF DAILY LIVING (ADL)
ADLS_ACUITY_SCORE: 46

## 2025-07-06 NOTE — ED TRIAGE NOTES
Pt BIBA from independent living facility where he lives with his wife and an in home nurse. Pt reports left flank pain x 4 days. 6/10 at rest and 9/10 with movement. Pain in area of ribs. Denies trauma, states it has gotten worse over time due to the use of his transfer board at home. Wheelchair bound. B. No other complaints.

## 2025-07-06 NOTE — ED PROVIDER NOTES
"  Emergency Department Note      History of Present Illness     Chief Complaint   Flank Pain (left)      HPI   Corwin Puentes is a 89 year old male with a history of atrial fibrillation, hyperlipidemia, hypertension, and basal cell carcinoma presenting with flank pain. Corwin reports pain to the left side of his rib cage for the last 4 days. Denies any other symptoms. History of shingles \"a long time ago\" and reports he wanted to get the shingles shot but they did not have the right dose for him.  His nurse Nati notes that she just saw the rash today on his chest.    Independent Historian   None    Review of External Notes   I reviewed the telemedicine family medicine office visit note on April 23, 2025.  Reviewed past medical history.  Reviewed emergency department visit notes on June 14, 2025 for syncope with constipation.  Reviewed emergency department visit note when I saw the patient on March 2, 2025 for syncope with constipation.    Past Medical History     Medical History and Problem List   Atrial fibrillation  Hypertension  Hyperlipidemia  Sepsis  Squamous cell carcinoma  Anemia  Hypothyroidism   CAD  Insomnia  Osteoarthritis  Abdominal aortic aneurysm     Medications   Lipitor  Dilacor  Ativan  Asprin 325mg  Atorvastatin  Keflex  Levaquin  Toprol  Roxicodone  Ambien  Senokot    Surgical History   Right knee arthoplasty  Left ankle surgery    Physical Exam     Patient Vitals for the past 24 hrs:   BP Temp Temp src Pulse Resp SpO2 Height Weight   07/06/25 1227 114/74 -- -- 79 -- -- -- --   07/06/25 1224 -- -- -- -- -- 99 % -- --   07/06/25 1135 -- -- -- 88 16 -- -- --   07/06/25 1115 -- -- -- -- -- 98 % -- --   07/06/25 1045 124/84 97.5  F (36.4  C) Oral -- -- 98 % 1.727 m (5' 8\") 56.7 kg (125 lb)     Physical Exam  General: Well-nourished, appears uncomfortable  Eyes: PERRL, conjunctivae pink no scleral icterus or conjunctival injection  ENT:  Moist mucus membranes, posterior oropharynx clear without " erythema or exudates  Respiratory:  Lungs clear to auscultation bilaterally, no crackles/rubs/wheezes.  Good air movement  CV: Normal rate and rhythm, no murmurs/rubs/gallops  GI:  Abdomen soft and non-distended.  Normoactive BS.  No tenderness, guarding or rebound  Skin: Tender rash in dermatomal distribution over the left chest wall seen below. Appears consistent with zoster rash.            Musculoskeletal: No peripheral edema or calf tenderness  Neuro: Alert and oriented to person/place/time  Psychiatric: Normal affect      Diagnostics     Lab Results   Labs Ordered and Resulted from Time of ED Arrival to Time of ED Departure   BASIC METABOLIC PANEL - Abnormal       Result Value    Sodium 137      Potassium 4.2      Chloride 100      Carbon Dioxide (CO2) 26      Anion Gap 11      Urea Nitrogen 26.9 (*)     Creatinine 0.99      GFR Estimate 69      Calcium 9.7      Glucose 124 (*)    CBC WITH PLATELETS AND DIFFERENTIAL - Abnormal    WBC Count 7.3      RBC Count 3.43 (*)     Hemoglobin 12.1 (*)     Hematocrit 34.7 (*)      (*)     MCH 35.3 (*)     MCHC 34.9      RDW 16.7 (*)     Platelet Count 242      % Neutrophils 71      % Lymphocytes 16      % Monocytes 11      % Eosinophils 1      % Basophils 1      % Immature Granulocytes 1      NRBCs per 100 WBC 0      Absolute Neutrophils 5.2      Absolute Lymphocytes 1.2      Absolute Monocytes 0.8      Absolute Eosinophils 0.1      Absolute Basophils 0.0      Absolute Immature Granulocytes 0.0      Absolute NRBCs 0.0     ROUTINE UA WITH MICROSCOPIC REFLEX TO CULTURE - Normal    Color Urine Yellow      Appearance Urine Clear      Glucose Urine Negative      Bilirubin Urine Negative      Ketones Urine Negative      Specific Gravity Urine 1.021      Blood Urine Negative      pH Urine 6.5      Protein Albumin Urine Negative      Urobilinogen Urine Normal      Nitrite Urine Negative      Leukocyte Esterase Urine Negative      RBC Urine 1      WBC Urine 1          Imaging   No orders to display     Independent Interpretation   None    ED Course      Medications Administered   Medications   valACYclovir (VALTREX) tablet 1,000 mg (1,000 mg Oral $Given 7/6/25 1127)       Procedures   Procedures     Discussion of Management   None    ED Course   ED Course as of 07/06/25 1349   Sun Jul 06, 2025   1050 I obtained the history and examined the patient as noted above.          Additional Documentation  None    Medical Decision Making / Diagnosis     CMS Diagnoses: None    MIPS   None         MDM   Corwin Puentes is a 89 year old male who comes today with left-sided flank and chest wall pain and is found to have a rash consistent with herpes zoster.  Given age and comorbidities, I did screen with blood work to be sure no significant abnormalities and these are all reassuring.  Urinalysis is bland without signs of infection nor hematuria to suggest that the urinary source of his pain.  Will start on valacyclovir.  Discussed pain medications and will start with Tylenol.  Given his history of syncope with constipation we really do want to avoid any medications that are going to cause worsening constipation and so if he is not able to tolerate the pain with just Tylenol he will call his doctor to get an prescription for additional pain relief.  At this time, with reasonable clinical confidence, I do believe he safe for discharge home.    Disposition   The patient was discharged.     Diagnosis     ICD-10-CM    1. Herpes zoster without complication  B02.9            Discharge Medications   New Prescriptions    VALACYCLOVIR (VALTREX) 1000 MG TABLET    Take 1 tablet (1,000 mg) by mouth 2 times daily for 7 days.         Scribe Disclosure:  I, Ashley Rouse, am serving as a scribe at 10:43 AM on 7/6/2025 to document services personally performed by Ronda Dinh MD based on my observations and the provider's statements to me.      Ronda Dinh MD  07/06/25 7998

## 2025-07-06 NOTE — ED NOTES
Bed: ED09  Expected date:   Expected time:   Means of arrival:   Comments:  [address] 89 M left sided flank pain eta 1030

## 2025-07-06 NOTE — DISCHARGE INSTRUCTIONS
*You may resume diet and activities.  *Tylenol as directed as needed for pain.  Valacyclovir as directed.   *Followup with your doctor for a recheck in the next week.  *Return if you develop fever, vomiting, abdominal pain, faint or feel like you will faint or become worse in any way.